# Patient Record
Sex: MALE | Race: WHITE | Employment: OTHER | URBAN - METROPOLITAN AREA
[De-identification: names, ages, dates, MRNs, and addresses within clinical notes are randomized per-mention and may not be internally consistent; named-entity substitution may affect disease eponyms.]

---

## 2017-05-05 ENCOUNTER — OFFICE VISIT (OUTPATIENT)
Dept: FAMILY MEDICINE CLINIC | Age: 77
End: 2017-05-05

## 2017-05-05 VITALS
BODY MASS INDEX: 35.37 KG/M2 | DIASTOLIC BLOOD PRESSURE: 72 MMHG | WEIGHT: 233.4 LBS | TEMPERATURE: 97.9 F | HEIGHT: 68 IN | SYSTOLIC BLOOD PRESSURE: 126 MMHG

## 2017-05-05 DIAGNOSIS — E78.00 PURE HYPERCHOLESTEROLEMIA: Primary | ICD-10-CM

## 2017-05-05 DIAGNOSIS — I10 ESSENTIAL HYPERTENSION, BENIGN: ICD-10-CM

## 2017-05-05 DIAGNOSIS — M10.9 GOUT OF FOOT, UNSPECIFIED CAUSE, UNSPECIFIED CHRONICITY, UNSPECIFIED LATERALITY: ICD-10-CM

## 2017-05-05 DIAGNOSIS — E78.00 PURE HYPERCHOLESTEROLEMIA: ICD-10-CM

## 2017-05-05 DIAGNOSIS — Z23 NEED FOR PNEUMOCOCCAL VACCINATION: ICD-10-CM

## 2017-05-05 LAB
ALT SERPL-CCNC: 41 U/L (ref 10–40)
ANION GAP SERPL CALCULATED.3IONS-SCNC: 18 MMOL/L (ref 3–16)
AST SERPL-CCNC: 30 U/L (ref 15–37)
BUN BLDV-MCNC: 27 MG/DL (ref 7–20)
CALCIUM SERPL-MCNC: 8.9 MG/DL (ref 8.3–10.6)
CHLORIDE BLD-SCNC: 100 MMOL/L (ref 99–110)
CHOLESTEROL, TOTAL: 149 MG/DL (ref 0–199)
CO2: 23 MMOL/L (ref 21–32)
CREAT SERPL-MCNC: 1.1 MG/DL (ref 0.8–1.3)
GFR AFRICAN AMERICAN: >60
GFR NON-AFRICAN AMERICAN: >60
GLUCOSE BLD-MCNC: 109 MG/DL (ref 70–99)
HDLC SERPL-MCNC: 37 MG/DL (ref 40–60)
LDL CHOLESTEROL CALCULATED: 80 MG/DL
POTASSIUM SERPL-SCNC: 4.1 MMOL/L (ref 3.5–5.1)
SODIUM BLD-SCNC: 141 MMOL/L (ref 136–145)
TRIGL SERPL-MCNC: 158 MG/DL (ref 0–150)
VLDLC SERPL CALC-MCNC: 32 MG/DL

## 2017-05-05 PROCEDURE — 90732 PPSV23 VACC 2 YRS+ SUBQ/IM: CPT | Performed by: INTERNAL MEDICINE

## 2017-05-05 PROCEDURE — 1036F TOBACCO NON-USER: CPT | Performed by: INTERNAL MEDICINE

## 2017-05-05 PROCEDURE — 1123F ACP DISCUSS/DSCN MKR DOCD: CPT | Performed by: INTERNAL MEDICINE

## 2017-05-05 PROCEDURE — 3017F COLORECTAL CA SCREEN DOC REV: CPT | Performed by: INTERNAL MEDICINE

## 2017-05-05 PROCEDURE — G8427 DOCREV CUR MEDS BY ELIG CLIN: HCPCS | Performed by: INTERNAL MEDICINE

## 2017-05-05 PROCEDURE — 4040F PNEUMOC VAC/ADMIN/RCVD: CPT | Performed by: INTERNAL MEDICINE

## 2017-05-05 PROCEDURE — G8417 CALC BMI ABV UP PARAM F/U: HCPCS | Performed by: INTERNAL MEDICINE

## 2017-05-05 PROCEDURE — 99213 OFFICE O/P EST LOW 20 MIN: CPT | Performed by: INTERNAL MEDICINE

## 2017-05-05 PROCEDURE — G0009 ADMIN PNEUMOCOCCAL VACCINE: HCPCS | Performed by: INTERNAL MEDICINE

## 2017-05-05 RX ORDER — LISINOPRIL 40 MG/1
TABLET ORAL
Qty: 30 TABLET | Refills: 5 | Status: SHIPPED | OUTPATIENT
Start: 2017-05-05 | End: 2017-10-12 | Stop reason: SDUPTHER

## 2017-05-05 RX ORDER — ALLOPURINOL 300 MG/1
TABLET ORAL
Qty: 30 TABLET | Refills: 5 | Status: SHIPPED | OUTPATIENT
Start: 2017-05-05 | End: 2017-11-10 | Stop reason: SDUPTHER

## 2017-05-05 RX ORDER — EZETIMIBE AND SIMVASTATIN 10; 40 MG/1; MG/1
1 TABLET ORAL DAILY
Qty: 30 TABLET | Refills: 5 | Status: SHIPPED | OUTPATIENT
Start: 2017-05-05 | End: 2017-11-10 | Stop reason: SDUPTHER

## 2017-05-05 RX ORDER — HYDROCHLOROTHIAZIDE 25 MG/1
TABLET ORAL
Qty: 30 TABLET | Refills: 5 | Status: SHIPPED | OUTPATIENT
Start: 2017-05-05 | End: 2017-11-10 | Stop reason: SDUPTHER

## 2017-05-05 ASSESSMENT — ENCOUNTER SYMPTOMS
CONSTIPATION: 0
WHEEZING: 0
SORE THROAT: 0
BLOOD IN STOOL: 0
RHINORRHEA: 0
APNEA: 0
ABDOMINAL PAIN: 0
NAUSEA: 0
SHORTNESS OF BREATH: 0
COUGH: 0
SINUS PRESSURE: 0
DIARRHEA: 0

## 2017-06-16 ENCOUNTER — TELEPHONE (OUTPATIENT)
Dept: FAMILY MEDICINE CLINIC | Age: 77
End: 2017-06-16

## 2017-06-19 ENCOUNTER — TELEPHONE (OUTPATIENT)
Dept: FAMILY MEDICINE CLINIC | Age: 77
End: 2017-06-19

## 2017-08-29 ENCOUNTER — OFFICE VISIT (OUTPATIENT)
Dept: FAMILY MEDICINE CLINIC | Age: 77
End: 2017-08-29

## 2017-08-29 VITALS
SYSTOLIC BLOOD PRESSURE: 126 MMHG | DIASTOLIC BLOOD PRESSURE: 64 MMHG | TEMPERATURE: 98.2 F | HEIGHT: 68 IN | BODY MASS INDEX: 35.49 KG/M2 | WEIGHT: 234.2 LBS

## 2017-08-29 DIAGNOSIS — B96.89 ACUTE BACTERIAL SINUSITIS: ICD-10-CM

## 2017-08-29 DIAGNOSIS — J01.90 ACUTE BACTERIAL SINUSITIS: ICD-10-CM

## 2017-08-29 PROCEDURE — G8427 DOCREV CUR MEDS BY ELIG CLIN: HCPCS | Performed by: INTERNAL MEDICINE

## 2017-08-29 PROCEDURE — 99213 OFFICE O/P EST LOW 20 MIN: CPT | Performed by: INTERNAL MEDICINE

## 2017-08-29 PROCEDURE — 1123F ACP DISCUSS/DSCN MKR DOCD: CPT | Performed by: INTERNAL MEDICINE

## 2017-08-29 PROCEDURE — G8417 CALC BMI ABV UP PARAM F/U: HCPCS | Performed by: INTERNAL MEDICINE

## 2017-08-29 PROCEDURE — 3017F COLORECTAL CA SCREEN DOC REV: CPT | Performed by: INTERNAL MEDICINE

## 2017-08-29 PROCEDURE — 4040F PNEUMOC VAC/ADMIN/RCVD: CPT | Performed by: INTERNAL MEDICINE

## 2017-08-29 PROCEDURE — 1036F TOBACCO NON-USER: CPT | Performed by: INTERNAL MEDICINE

## 2017-08-29 RX ORDER — CEFUROXIME AXETIL 250 MG/1
250 TABLET ORAL 2 TIMES DAILY
Qty: 20 TABLET | Refills: 0 | Status: SHIPPED | OUTPATIENT
Start: 2017-08-29 | End: 2017-09-08

## 2017-08-29 ASSESSMENT — ENCOUNTER SYMPTOMS
RHINORRHEA: 1
COUGH: 0
SHORTNESS OF BREATH: 0
WHEEZING: 0
SORE THROAT: 1
SINUS PRESSURE: 1
APNEA: 0
ABDOMINAL PAIN: 0

## 2017-10-13 RX ORDER — LISINOPRIL 40 MG/1
TABLET ORAL
Qty: 30 TABLET | Refills: 4 | Status: SHIPPED | OUTPATIENT
Start: 2017-10-13 | End: 2017-11-10 | Stop reason: SDUPTHER

## 2017-11-10 ENCOUNTER — OFFICE VISIT (OUTPATIENT)
Dept: FAMILY MEDICINE CLINIC | Age: 77
End: 2017-11-10

## 2017-11-10 VITALS
TEMPERATURE: 98 F | BODY MASS INDEX: 34.92 KG/M2 | WEIGHT: 230.4 LBS | DIASTOLIC BLOOD PRESSURE: 66 MMHG | SYSTOLIC BLOOD PRESSURE: 118 MMHG | HEIGHT: 68 IN

## 2017-11-10 DIAGNOSIS — E78.00 PURE HYPERCHOLESTEROLEMIA: ICD-10-CM

## 2017-11-10 DIAGNOSIS — M79.645 THUMB PAIN, LEFT: ICD-10-CM

## 2017-11-10 DIAGNOSIS — Z12.5 SCREENING PSA (PROSTATE SPECIFIC ANTIGEN): ICD-10-CM

## 2017-11-10 DIAGNOSIS — E78.00 PURE HYPERCHOLESTEROLEMIA: Primary | ICD-10-CM

## 2017-11-10 DIAGNOSIS — I10 ESSENTIAL HYPERTENSION, BENIGN: ICD-10-CM

## 2017-11-10 LAB
ALT SERPL-CCNC: 48 U/L (ref 10–40)
ANION GAP SERPL CALCULATED.3IONS-SCNC: 15 MMOL/L (ref 3–16)
AST SERPL-CCNC: 37 U/L (ref 15–37)
BUN BLDV-MCNC: 31 MG/DL (ref 7–20)
CALCIUM SERPL-MCNC: 9.4 MG/DL (ref 8.3–10.6)
CHLORIDE BLD-SCNC: 100 MMOL/L (ref 99–110)
CHOLESTEROL, TOTAL: 144 MG/DL (ref 0–199)
CO2: 26 MMOL/L (ref 21–32)
CREAT SERPL-MCNC: 1.1 MG/DL (ref 0.8–1.3)
GFR AFRICAN AMERICAN: >60
GFR NON-AFRICAN AMERICAN: >60
GLUCOSE BLD-MCNC: 101 MG/DL (ref 70–99)
HDLC SERPL-MCNC: 40 MG/DL (ref 40–60)
LDL CHOLESTEROL CALCULATED: 73 MG/DL
POTASSIUM SERPL-SCNC: 4.1 MMOL/L (ref 3.5–5.1)
PROSTATE SPECIFIC ANTIGEN: 1.51 NG/ML (ref 0–4)
SODIUM BLD-SCNC: 141 MMOL/L (ref 136–145)
TRIGL SERPL-MCNC: 157 MG/DL (ref 0–150)
VLDLC SERPL CALC-MCNC: 31 MG/DL

## 2017-11-10 PROCEDURE — 4040F PNEUMOC VAC/ADMIN/RCVD: CPT | Performed by: INTERNAL MEDICINE

## 2017-11-10 PROCEDURE — G8427 DOCREV CUR MEDS BY ELIG CLIN: HCPCS | Performed by: INTERNAL MEDICINE

## 2017-11-10 PROCEDURE — G8484 FLU IMMUNIZE NO ADMIN: HCPCS | Performed by: INTERNAL MEDICINE

## 2017-11-10 PROCEDURE — 99214 OFFICE O/P EST MOD 30 MIN: CPT | Performed by: INTERNAL MEDICINE

## 2017-11-10 PROCEDURE — G8417 CALC BMI ABV UP PARAM F/U: HCPCS | Performed by: INTERNAL MEDICINE

## 2017-11-10 PROCEDURE — 1036F TOBACCO NON-USER: CPT | Performed by: INTERNAL MEDICINE

## 2017-11-10 PROCEDURE — 1123F ACP DISCUSS/DSCN MKR DOCD: CPT | Performed by: INTERNAL MEDICINE

## 2017-11-10 PROCEDURE — 3017F COLORECTAL CA SCREEN DOC REV: CPT | Performed by: INTERNAL MEDICINE

## 2017-11-10 RX ORDER — ALLOPURINOL 300 MG/1
TABLET ORAL
Qty: 30 TABLET | Refills: 5 | Status: SHIPPED | OUTPATIENT
Start: 2017-11-10 | End: 2018-05-23 | Stop reason: SDUPTHER

## 2017-11-10 RX ORDER — LISINOPRIL 40 MG/1
TABLET ORAL
Qty: 30 TABLET | Refills: 4 | Status: SHIPPED | OUTPATIENT
Start: 2017-11-10 | End: 2018-08-13 | Stop reason: SDUPTHER

## 2017-11-10 RX ORDER — HYDROCHLOROTHIAZIDE 25 MG/1
TABLET ORAL
Qty: 30 TABLET | Refills: 5 | Status: SHIPPED | OUTPATIENT
Start: 2017-11-10 | End: 2018-04-16 | Stop reason: SDUPTHER

## 2017-11-10 RX ORDER — EZETIMIBE AND SIMVASTATIN 10; 40 MG/1; MG/1
1 TABLET ORAL DAILY
Qty: 30 TABLET | Refills: 5 | Status: SHIPPED | OUTPATIENT
Start: 2017-11-10 | End: 2018-04-16 | Stop reason: SDUPTHER

## 2017-11-10 ASSESSMENT — ENCOUNTER SYMPTOMS
SHORTNESS OF BREATH: 0
WHEEZING: 0
VOMITING: 0
SORE THROAT: 0
SINUS PAIN: 0
BLOOD IN STOOL: 0
APNEA: 0
COUGH: 0
ABDOMINAL PAIN: 0
DIARRHEA: 0
NAUSEA: 0
CONSTIPATION: 0
RHINORRHEA: 0

## 2017-11-10 ASSESSMENT — PATIENT HEALTH QUESTIONNAIRE - PHQ9
SUM OF ALL RESPONSES TO PHQ9 QUESTIONS 1 & 2: 0
SUM OF ALL RESPONSES TO PHQ QUESTIONS 1-9: 0
1. LITTLE INTEREST OR PLEASURE IN DOING THINGS: 0
2. FEELING DOWN, DEPRESSED OR HOPELESS: 0

## 2017-11-10 NOTE — PROGRESS NOTES
Immunization History   Administered Date(s) Administered    Influenza, High Dose 09/28/2015, 10/11/2016, 10/15/2017    Pneumococcal 13-valent Conjugate ([id number]) 04/14/2015, 05/04/2016    Pneumococcal Conjugate 7-valent 12/02/2008    Pneumococcal Polysaccharide ([id number]) 05/05/2017    Tdap (Boostrix, Adacel) 06/20/2011    Zoster 03/25/2009
He appears well-developed and well-nourished. No distress. HENT:   Head: Normocephalic and atraumatic. Right Ear: External ear normal.   Mouth/Throat: Oropharynx is clear and moist.   Eyes: Conjunctivae are normal. Pupils are equal, round, and reactive to light. Neck: No JVD present. No tracheal deviation present. No thyromegaly present. Cardiovascular: Regular rhythm. No murmur heard. Pulmonary/Chest: Effort normal and breath sounds normal. No respiratory distress. He has no wheezes. He has no rales. He exhibits no tenderness. Abdominal: He exhibits no distension. There is no tenderness. There is no rebound and no guarding. Musculoskeletal: He exhibits no edema. Lymphadenopathy:     He has no cervical adenopathy. Neurological: He is alert and oriented to person, place, and time. No cranial nerve deficit. Skin: No rash noted. He is not diaphoretic. Psychiatric: He has a normal mood and affect. His behavior is normal.       Assessment:      1. Pure hypercholesterolemia  LIPID PANEL    AST    ALT   2. Screening PSA (prostate specific antigen)  PSA Screening   3. Essential hypertension, benign  BASIC METABOLIC PANEL   4.  Thumb pain, left     tendonitis thumb and heal      Plan:      Outpatient Encounter Prescriptions as of 11/10/2017   Medication Sig Dispense Refill    lisinopril (PRINIVIL;ZESTRIL) 40 MG tablet TAKE ONE TABLET BY MOUTH DAILY 30 tablet 4    hydrocortisone (PROCTOZONE-HC) 2.5 % rectal cream Apply twice daily to affected area 1 Tube 1    hydrochlorothiazide (HYDRODIURIL) 25 MG tablet TAKE ONE TABLET BY MOUTH DAILY 30 tablet 5    allopurinol (ZYLOPRIM) 300 MG tablet TAKE ONE TABLET BY MOUTH DAILY 30 tablet 5    ezetimibe-simvastatin (VYTORIN) 10-40 MG per tablet Take 1 tablet by mouth daily 30 tablet 5    Omega-3 Fatty Acids (FISH OIL) 1000 MG CAPS Take 1,000 mg by mouth 2 times daily      aspirin 81 MG tablet Take 81 mg by mouth daily      MULTIPLE VITAMIN PO Take by mouth

## 2018-02-26 ENCOUNTER — OFFICE VISIT (OUTPATIENT)
Dept: FAMILY MEDICINE CLINIC | Age: 78
End: 2018-02-26

## 2018-02-26 VITALS
HEIGHT: 67 IN | DIASTOLIC BLOOD PRESSURE: 75 MMHG | HEART RATE: 90 BPM | BODY MASS INDEX: 36.88 KG/M2 | WEIGHT: 235 LBS | SYSTOLIC BLOOD PRESSURE: 130 MMHG

## 2018-02-26 DIAGNOSIS — E78.00 PURE HYPERCHOLESTEROLEMIA: ICD-10-CM

## 2018-02-26 DIAGNOSIS — I10 ESSENTIAL HYPERTENSION, BENIGN: Primary | ICD-10-CM

## 2018-02-26 DIAGNOSIS — R35.1 NOCTURIA: ICD-10-CM

## 2018-02-26 DIAGNOSIS — M10.9 GOUT OF FOOT, UNSPECIFIED CAUSE, UNSPECIFIED CHRONICITY, UNSPECIFIED LATERALITY: ICD-10-CM

## 2018-02-26 PROCEDURE — 1123F ACP DISCUSS/DSCN MKR DOCD: CPT | Performed by: FAMILY MEDICINE

## 2018-02-26 PROCEDURE — 4040F PNEUMOC VAC/ADMIN/RCVD: CPT | Performed by: FAMILY MEDICINE

## 2018-02-26 PROCEDURE — 3017F COLORECTAL CA SCREEN DOC REV: CPT | Performed by: FAMILY MEDICINE

## 2018-02-26 PROCEDURE — 1036F TOBACCO NON-USER: CPT | Performed by: FAMILY MEDICINE

## 2018-02-26 PROCEDURE — 99215 OFFICE O/P EST HI 40 MIN: CPT | Performed by: FAMILY MEDICINE

## 2018-02-26 PROCEDURE — G8484 FLU IMMUNIZE NO ADMIN: HCPCS | Performed by: FAMILY MEDICINE

## 2018-02-26 PROCEDURE — G8417 CALC BMI ABV UP PARAM F/U: HCPCS | Performed by: FAMILY MEDICINE

## 2018-02-26 PROCEDURE — G8427 DOCREV CUR MEDS BY ELIG CLIN: HCPCS | Performed by: FAMILY MEDICINE

## 2018-02-26 RX ORDER — TAMSULOSIN HYDROCHLORIDE 0.4 MG/1
0.4 CAPSULE ORAL DAILY
Qty: 30 CAPSULE | Refills: 3 | Status: SHIPPED | OUTPATIENT
Start: 2018-02-26 | End: 2018-06-22 | Stop reason: SDUPTHER

## 2018-02-26 ASSESSMENT — ENCOUNTER SYMPTOMS
EYES NEGATIVE: 1
VOMITING: 0
SORE THROAT: 0
GASTROINTESTINAL NEGATIVE: 1
NAUSEA: 0
EYE PAIN: 0
CONSTIPATION: 0
SHORTNESS OF BREATH: 0
RESPIRATORY NEGATIVE: 1
ABDOMINAL PAIN: 0
COLOR CHANGE: 0
COUGH: 0
DIARRHEA: 0
RHINORRHEA: 0

## 2018-02-26 NOTE — PROGRESS NOTES
Medication Sig Dispense Refill    lisinopril (PRINIVIL;ZESTRIL) 40 MG tablet TAKE ONE TABLET BY MOUTH DAILY 30 tablet 4    hydrocortisone (PROCTOZONE-HC) 2.5 % rectal cream Apply twice daily to affected area 1 Tube 1    hydrochlorothiazide (HYDRODIURIL) 25 MG tablet TAKE ONE TABLET BY MOUTH DAILY 30 tablet 5    allopurinol (ZYLOPRIM) 300 MG tablet TAKE ONE TABLET BY MOUTH DAILY 30 tablet 5    ezetimibe-simvastatin (VYTORIN) 10-40 MG per tablet Take 1 tablet by mouth daily 30 tablet 5    Omega-3 Fatty Acids (FISH OIL) 1000 MG CAPS Take 1,000 mg by mouth 2 times daily      aspirin 81 MG tablet Take 81 mg by mouth daily      MULTIPLE VITAMIN PO Take by mouth daily      Hydrocortisone Butyrate 0.1 % OINT        No current facility-administered medications on file prior to visit.         Past Medical History:   Diagnosis Date    Colitis     Diverticulitis     Diverticulosis     Essential hypertension, benign     Gout     Hyperlipidemia     Malignant melanoma of neck ([address]     PVC's (premature ventricular contractions)      Past Surgical History:   Procedure Laterality Date    BLADDER REPAIR  7/23/1998    partial cystectomy    COLECTOMY      COLON SURGERY      COLONOSCOPY      HERNIA REPAIR  1969, 1999, 2005    SKIN BIOPSY      SKIN CANCER EXCISION  9/30/2013    TONSILLECTOMY  1947    TUMOR REMOVAL  1970    right forearm     Family History   Problem Relation Age of Onset    Heart Failure Father 66    Stroke Mother 80    Other Sister 21     mastoids    Heart Failure Brother     Heart Failure Brother 68     older brother   Jose D [place] Sister 68     oldest sister/breast/brain    Cancer Sister 76     Social History   Substance Use Topics    Smoking status: Never Smoker    Smokeless tobacco: Never Used    Alcohol use 1.8 - 2.4 oz/week     3 - 4 Standard drinks or equivalent per week      Comment: every other day       Objective   /75   Pulse 90   Ht 5' 7\" (1.702 m)   Wt 235 lb (106.6 kg)   BMI 36.81 kg/m²   Wt Readings from Last 3 Encounters:   02/26/18 235 lb (106.6 kg)   11/10/17 230 lb 6.4 oz (104.5 kg)   08/29/17 234 lb 3.2 oz (106.2 kg)       Physical Exam   Constitutional: He appears well-developed and well-nourished. Moderately obese   HENT:   Head: Normocephalic and atraumatic. Nose: Nose normal.   Mouth/Throat: No oropharyngeal exudate. TMs negative bilaterally, canals patent, nasal mucosa pink and patent,  Oropharynx pink and patent   Eyes: Pupils are equal, round, and reactive to light. Right eye exhibits no discharge. Left eye exhibits no discharge. No scleral icterus. Neck: Normal range of motion. Neck supple. No thyromegaly present. Cardiovascular: Normal rate, regular rhythm, normal heart sounds and intact distal pulses. Exam reveals no gallop and no friction rub. No murmur heard. Pulses:       Dorsalis pedis pulses are 2+ on the right side, and 2+ on the left side. Posterior tibial pulses are 2+ on the right side, and 2+ on the left side. 2/6 systolic murmur   Pulmonary/Chest: Effort normal and breath sounds normal. He has no wheezes. He has no rales. Abdominal: Soft. Bowel sounds are normal. He exhibits no distension. There is no splenomegaly or hepatomegaly. There is no tenderness. There is no rebound and no guarding. Musculoskeletal: Normal range of motion. He exhibits no tenderness or deformity. Lymphadenopathy:     He has no cervical adenopathy. Neurological: He is alert. He has normal strength. No cranial nerve deficit or sensory deficit. Gait normal.   Skin: Skin is warm and dry. No rash noted. No erythema. Psychiatric: He has a normal mood and affect. His speech is normal and behavior is normal.   Vitals reviewed.         Chemistry        Component Value Date/Time     11/10/2017 0851    K 4.1 11/10/2017 0851     11/10/2017 0851    CO2 26 11/10/2017 0851    BUN 31 (H) 11/10/2017 0851    CREATININE 1.1 11/10/2017 4502 Component Value Date/Time    CALCIUM 9.4 11/10/2017 0851    ALKPHOS 85 08/13/2016 0846    AST 37 11/10/2017 0851    ALT 48 (H) 11/10/2017 0851    BILITOT 0.8 08/13/2016 0846          Lab Results   Component Value Date    WBC 7.6 08/13/2016    HGB 14.7 08/13/2016    HCT 44.1 08/13/2016    MCV 95.2 08/13/2016     08/13/2016     No results found for: LABA1C  No results found for: EAG  No results found for: LABA1C  No components found for: CHLPL  Lab Results   Component Value Date    TRIG 157 (H) 11/10/2017    TRIG 158 (H) 05/05/2017    TRIG 170 (H) 11/04/2016     Lab Results   Component Value Date    HDL 40 11/10/2017    HDL 37 (L) 05/05/2017    HDL 36 (L) 11/04/2016     Lab Results   Component Value Date    LDLCALC 73 11/10/2017    LDLCALC 80 05/05/2017    LDLCALC 72 11/04/2016     Lab Results   Component Value Date    LABVLDL 31 11/10/2017    LABVLDL 32 05/05/2017    LABVLDL 34 11/04/2016         Assessment   Plan     1. Essential hypertension, benign  Controlled: Appears stable. We will continue current management and monitor for adverse reaction and disease progression. Follow-up as noted below      2. Gout of foot, unspecified cause, unspecified chronicity, unspecified laterality  Controlled: Appears stable. We will continue current management and monitor for adverse reaction and disease progression. Follow-up as noted below      3. Pure hypercholesterolemia  Controlled: Appears stable. We will continue current management and monitor for adverse reaction and disease progression. Follow-up as noted below      4. Nocturia  Mild to moderate symptomatology : We will trial Flomax and recheck patient in 2 months. - tamsulosin (FLOMAX) 0.4 MG capsule; Take 1 capsule by mouth daily  Dispense: 30 capsule; Refill: 3    Discussed use, benefit, and side effects of prescribed medications. Barriers to medication compliance addressed. All patient questions answered. Pt voiced understanding.          RTC 2

## 2018-03-27 ENCOUNTER — OFFICE VISIT (OUTPATIENT)
Dept: FAMILY MEDICINE CLINIC | Age: 78
End: 2018-03-27

## 2018-03-27 VITALS
HEIGHT: 67 IN | DIASTOLIC BLOOD PRESSURE: 76 MMHG | HEART RATE: 83 BPM | SYSTOLIC BLOOD PRESSURE: 164 MMHG | WEIGHT: 237 LBS | BODY MASS INDEX: 37.2 KG/M2

## 2018-03-27 DIAGNOSIS — J20.9 ACUTE BRONCHITIS, UNSPECIFIED ORGANISM: ICD-10-CM

## 2018-03-27 DIAGNOSIS — I35.0 AORTIC STENOSIS, MILD: ICD-10-CM

## 2018-03-27 DIAGNOSIS — R35.1 NOCTURIA: Primary | ICD-10-CM

## 2018-03-27 PROCEDURE — 1036F TOBACCO NON-USER: CPT | Performed by: FAMILY MEDICINE

## 2018-03-27 PROCEDURE — 3017F COLORECTAL CA SCREEN DOC REV: CPT | Performed by: FAMILY MEDICINE

## 2018-03-27 PROCEDURE — G8482 FLU IMMUNIZE ORDER/ADMIN: HCPCS | Performed by: FAMILY MEDICINE

## 2018-03-27 PROCEDURE — 99214 OFFICE O/P EST MOD 30 MIN: CPT | Performed by: FAMILY MEDICINE

## 2018-03-27 PROCEDURE — 4040F PNEUMOC VAC/ADMIN/RCVD: CPT | Performed by: FAMILY MEDICINE

## 2018-03-27 PROCEDURE — 1123F ACP DISCUSS/DSCN MKR DOCD: CPT | Performed by: FAMILY MEDICINE

## 2018-03-27 PROCEDURE — G8417 CALC BMI ABV UP PARAM F/U: HCPCS | Performed by: FAMILY MEDICINE

## 2018-03-27 PROCEDURE — G8427 DOCREV CUR MEDS BY ELIG CLIN: HCPCS | Performed by: FAMILY MEDICINE

## 2018-03-27 RX ORDER — SULFAMETHOXAZOLE AND TRIMETHOPRIM 800; 160 MG/1; MG/1
1 TABLET ORAL 2 TIMES DAILY
Qty: 14 TABLET | Refills: 0 | Status: SHIPPED | OUTPATIENT
Start: 2018-03-27 | End: 2018-04-03

## 2018-03-27 RX ORDER — BENZONATATE 100 MG/1
200 CAPSULE ORAL 3 TIMES DAILY PRN
Qty: 20 CAPSULE | Refills: 0 | Status: SHIPPED | OUTPATIENT
Start: 2018-03-27 | End: 2018-04-03

## 2018-03-27 ASSESSMENT — ENCOUNTER SYMPTOMS
NAUSEA: 0
CONSTIPATION: 0
ABDOMINAL PAIN: 0
COUGH: 1
DIARRHEA: 0
SHORTNESS OF BREATH: 0
SORE THROAT: 1
VOMITING: 0
RHINORRHEA: 1

## 2018-03-27 NOTE — PROGRESS NOTES
Chief Complaint   Patient presents with    Chest Congestion     Week    Cough     Productive yellowish/Grey w/ a little bloody/A week    Other     Tickle in his throat    Other     Sneezing         HPI:  Natasha Pond is a 78 y.o. (: 1940) here today   for multiple medical problems. He is taking his medicine for his Nocturia without side effects. He is only getting up at night to go to the bathroom on an average twice a night. He says that his stream is much stronger than it was. He has been Told  Aortic stenosis is mild and doesn't have any plans for further visits. He is able to mow the lawn without exertional dyspnea or chest pain    He complains of a one-week history of cough associated with blood-tinged in great sputum worse in the mornings. As well as sinus congestion and some sore throat      Review of Systems   Constitutional: Negative for chills and fever. HENT: Positive for rhinorrhea and sore throat. Respiratory: Positive for cough. Negative for shortness of breath. Cardiovascular: Negative for chest pain and palpitations. Gastrointestinal: Negative for abdominal pain, constipation, diarrhea, nausea and vomiting. Endocrine: Negative for polyuria. Genitourinary: Negative for dysuria.        Past Medical History:   Diagnosis Date    Aortic stenosis, mild 3/27/2018    Colitis     Diverticulitis     Diverticulosis     Essential hypertension, benign     Gout     Hyperlipidemia     Malignant [facility] of [facility] ([facility] [address] [address]     PVC's (premature ventricular contractions)      Family History   Problem Relation Age of Onset    Heart Failure Father 66    Stroke Mother 80    Other Sister 21     mastoids    Heart Failure Brother     Heart Failure Brother 68     older brother    Cancer Sister 68     oldest sister/breast/brain    Cancer Sister 76     Social History     Social History    Marital status:      Spouse name: Derrick Cervantes Number of children: 6    Years of education: N/A     Occupational History    retired      Social History Main Topics    Smoking status: Never Smoker    Smokeless tobacco: Never Used    Alcohol use 1.8 - 2.4 oz/week     3 - 4 Standard drinks or equivalent per week      Comment: every other day    Drug use: No    Sexual activity: Yes     Partners: Female     Other Topics Concern    Not on file     Social History Narrative    No narrative on file       New Prescriptions    No medications on file         Meds Prior to visit:  Prior to Visit Medications    Medication Sig Taking? Authorizing Provider   tamsulosin (FLOMAX) 0.4 MG capsule Take 1 capsule by mouth daily Yes Ortiz Sanders MD   lisinopril (PRINIVIL;ZESTRIL) 40 MG tablet TAKE ONE TABLET BY MOUTH DAILY Yes Jesenia Butt, DO   hydrocortisone (PROCTOZONE-HC) 2.5 % rectal cream Apply twice daily to affected area Yes Jesenia Butt, DO   hydrochlorothiazide (HYDRODIURIL) 25 MG tablet TAKE ONE TABLET BY MOUTH DAILY Yes Jesenia Butt, DO   allopurinol (ZYLOPRIM) 300 MG tablet TAKE ONE TABLET BY MOUTH DAILY Yes Jesenia Butt, DO   ezetimibe-simvastatin (VYTORIN) 10-40 MG per tablet Take 1 tablet by mouth daily Yes Jesenia Butt, DO   Omega-3 Fatty Acids (FISH OIL) 1000 MG CAPS Take 1,000 mg by mouth 2 times daily Yes Historical Provider, MD   aspirin 81 MG tablet Take 81 mg by mouth daily Yes Historical Provider, MD   MULTIPLE VITAMIN PO Take by mouth daily Yes Historical Provider, MD   Hydrocortisone Butyrate 0.1 % OINT  Yes Historical Provider, MD     Allergies   Allergen Reactions    Guaifenesin & Derivatives      Nervous.  Metamucil [Psyllium]      Chest pain.  Norflex Tablets [Orphenadrine]      Urination issues.        OBJECTIVE:    Ht 5' 7\" (1.702 m)   Wt 237 lb (107.5 kg)   BMI 37.12 kg/m²   BP Readings from Last 2 Encounters:   02/26/18 130/75   11/10/17 118/66     Wt Readings from Last 3 Encounters:   03/27/18 237 lb (107.5 kg)   02/26/18 235 lb (106.6 kg)   11/10/17 230 lb

## 2018-03-30 ENCOUNTER — HOSPITAL ENCOUNTER (OUTPATIENT)
Dept: NON INVASIVE DIAGNOSTICS | Age: 78
Discharge: HOME | End: 2018-03-30
Attending: FAMILY MEDICINE | Admitting: FAMILY MEDICINE

## 2018-03-30 DIAGNOSIS — I35.0 NONRHEUMATIC AORTIC VALVE STENOSIS: ICD-10-CM

## 2018-03-30 LAB
LV EF: 63 %
LVEF MODALITY: NORMAL

## 2018-04-16 RX ORDER — EZETIMIBE AND SIMVASTATIN 10; 40 MG/1; MG/1
TABLET ORAL
Qty: 30 TABLET | Refills: 4 | Status: SHIPPED | OUTPATIENT
Start: 2018-04-16 | End: 2018-08-15 | Stop reason: SDUPTHER

## 2018-04-16 RX ORDER — HYDROCHLOROTHIAZIDE 25 MG/1
TABLET ORAL
Qty: 30 TABLET | Refills: 4 | Status: SHIPPED | OUTPATIENT
Start: 2018-04-16 | End: 2018-08-15 | Stop reason: SDUPTHER

## 2018-05-03 ENCOUNTER — OFFICE VISIT (OUTPATIENT)
Dept: FAMILY MEDICINE CLINIC | Age: 78
End: 2018-05-03

## 2018-05-03 VITALS
HEART RATE: 80 BPM | DIASTOLIC BLOOD PRESSURE: 71 MMHG | SYSTOLIC BLOOD PRESSURE: 130 MMHG | HEIGHT: 67 IN | BODY MASS INDEX: 36.73 KG/M2 | WEIGHT: 234 LBS

## 2018-05-03 DIAGNOSIS — E78.00 PURE HYPERCHOLESTEROLEMIA: ICD-10-CM

## 2018-05-03 DIAGNOSIS — I35.0 AORTIC STENOSIS, MILD: ICD-10-CM

## 2018-05-03 DIAGNOSIS — I10 ESSENTIAL HYPERTENSION, BENIGN: Primary | ICD-10-CM

## 2018-05-03 DIAGNOSIS — M10.9 GOUT OF FOOT, UNSPECIFIED CAUSE, UNSPECIFIED CHRONICITY, UNSPECIFIED LATERALITY: ICD-10-CM

## 2018-05-03 PROBLEM — M79.645 THUMB PAIN, LEFT: Status: RESOLVED | Noted: 2017-11-10 | Resolved: 2018-05-03

## 2018-05-03 PROBLEM — B96.89 ACUTE BACTERIAL SINUSITIS: Status: RESOLVED | Noted: 2017-08-29 | Resolved: 2018-05-03

## 2018-05-03 PROBLEM — J01.90 ACUTE BACTERIAL SINUSITIS: Status: RESOLVED | Noted: 2017-08-29 | Resolved: 2018-05-03

## 2018-05-03 LAB
A/G RATIO: 2 (ref 1.1–2.2)
ALBUMIN SERPL-MCNC: 4.4 G/DL (ref 3.4–5)
ALP BLD-CCNC: 75 U/L (ref 40–129)
ALT SERPL-CCNC: 40 U/L (ref 10–40)
ANION GAP SERPL CALCULATED.3IONS-SCNC: 17 MMOL/L (ref 3–16)
AST SERPL-CCNC: 47 U/L (ref 15–37)
BILIRUB SERPL-MCNC: 0.8 MG/DL (ref 0–1)
BUN BLDV-MCNC: 27 MG/DL (ref 7–20)
CALCIUM SERPL-MCNC: 9 MG/DL (ref 8.3–10.6)
CHLORIDE BLD-SCNC: 101 MMOL/L (ref 99–110)
CO2: 24 MMOL/L (ref 21–32)
CREAT SERPL-MCNC: 1.2 MG/DL (ref 0.8–1.3)
GFR AFRICAN AMERICAN: >60
GFR NON-AFRICAN AMERICAN: 59
GLOBULIN: 2.2 G/DL
GLUCOSE BLD-MCNC: 95 MG/DL (ref 70–99)
POTASSIUM SERPL-SCNC: 4.1 MMOL/L (ref 3.5–5.1)
SODIUM BLD-SCNC: 142 MMOL/L (ref 136–145)
TOTAL PROTEIN: 6.6 G/DL (ref 6.4–8.2)
URIC ACID, SERUM: 7 MG/DL (ref 3.5–7.2)

## 2018-05-03 PROCEDURE — G8417 CALC BMI ABV UP PARAM F/U: HCPCS | Performed by: FAMILY MEDICINE

## 2018-05-03 PROCEDURE — 3017F COLORECTAL CA SCREEN DOC REV: CPT | Performed by: FAMILY MEDICINE

## 2018-05-03 PROCEDURE — 4040F PNEUMOC VAC/ADMIN/RCVD: CPT | Performed by: FAMILY MEDICINE

## 2018-05-03 PROCEDURE — 1123F ACP DISCUSS/DSCN MKR DOCD: CPT | Performed by: FAMILY MEDICINE

## 2018-05-03 PROCEDURE — 1036F TOBACCO NON-USER: CPT | Performed by: FAMILY MEDICINE

## 2018-05-03 PROCEDURE — 99214 OFFICE O/P EST MOD 30 MIN: CPT | Performed by: FAMILY MEDICINE

## 2018-05-03 PROCEDURE — G8427 DOCREV CUR MEDS BY ELIG CLIN: HCPCS | Performed by: FAMILY MEDICINE

## 2018-05-03 PROCEDURE — 36415 COLL VENOUS BLD VENIPUNCTURE: CPT | Performed by: FAMILY MEDICINE

## 2018-05-03 ASSESSMENT — ENCOUNTER SYMPTOMS
VOMITING: 0
NAUSEA: 0
COUGH: 0
ABDOMINAL PAIN: 0
DIARRHEA: 0
SHORTNESS OF BREATH: 0
CONSTIPATION: 0

## 2018-05-23 ENCOUNTER — TELEPHONE (OUTPATIENT)
Dept: FAMILY MEDICINE CLINIC | Age: 78
End: 2018-05-23

## 2018-05-23 DIAGNOSIS — M10.9 GOUT OF FOOT, UNSPECIFIED CAUSE, UNSPECIFIED CHRONICITY, UNSPECIFIED LATERALITY: Primary | ICD-10-CM

## 2018-05-23 RX ORDER — ALLOPURINOL 300 MG/1
TABLET ORAL
Qty: 30 TABLET | Refills: 5 | Status: SHIPPED | OUTPATIENT
Start: 2018-05-23 | End: 2018-05-24 | Stop reason: SDUPTHER

## 2018-05-24 DIAGNOSIS — M10.9 GOUT OF FOOT, UNSPECIFIED CAUSE, UNSPECIFIED CHRONICITY, UNSPECIFIED LATERALITY: ICD-10-CM

## 2018-05-24 RX ORDER — ALLOPURINOL 300 MG/1
TABLET ORAL
Qty: 30 TABLET | Refills: 5 | Status: SHIPPED | OUTPATIENT
Start: 2018-05-24 | End: 2018-12-18 | Stop reason: SDUPTHER

## 2018-06-22 DIAGNOSIS — R35.1 NOCTURIA: ICD-10-CM

## 2018-06-22 RX ORDER — TAMSULOSIN HYDROCHLORIDE 0.4 MG/1
CAPSULE ORAL
Qty: 90 CAPSULE | Refills: 3 | Status: SHIPPED | OUTPATIENT
Start: 2018-06-22 | End: 2019-04-23 | Stop reason: SDUPTHER

## 2018-08-07 ENCOUNTER — TELEPHONE (OUTPATIENT)
Dept: FAMILY MEDICINE CLINIC | Age: 78
End: 2018-08-07

## 2018-08-07 NOTE — TELEPHONE ENCOUNTER
Dr. Genoveva Gilmore:    Notes: This patient has an upcoming appointment with you for Hypertension. In planning for that visit I have completed the following pre-visit planning:     Pre-Visit Planning Checklist:  Patient contacted: yes  Verified patient by name and date of birth: yes    Health Maintenance items reviewed:    No pre-visit planning health maintenance topics to review at this time    Labs and procedures pended:     Labs and procedures discussed with patient: yes  Reminded patient to check with their insurance company about coverage for lab tests and lab location: no    Preliminary Medication Reconciliation: was performed. Reminded patient to arrive early: yes    Please complete the med-reconciliation and sign the appropriate labs as soon as possible.       Toribio Julian, [address]  Pre-Services Specialist

## 2018-08-13 ENCOUNTER — OFFICE VISIT (OUTPATIENT)
Dept: FAMILY MEDICINE CLINIC | Age: 78
End: 2018-08-13

## 2018-08-13 VITALS
BODY MASS INDEX: 36.88 KG/M2 | WEIGHT: 235 LBS | SYSTOLIC BLOOD PRESSURE: 132 MMHG | DIASTOLIC BLOOD PRESSURE: 72 MMHG | HEIGHT: 67 IN | HEART RATE: 76 BPM

## 2018-08-13 DIAGNOSIS — I10 ESSENTIAL HYPERTENSION, BENIGN: Primary | ICD-10-CM

## 2018-08-13 DIAGNOSIS — M10.9 GOUT OF FOOT, UNSPECIFIED CAUSE, UNSPECIFIED CHRONICITY, UNSPECIFIED LATERALITY: ICD-10-CM

## 2018-08-13 DIAGNOSIS — E78.00 PURE HYPERCHOLESTEROLEMIA: ICD-10-CM

## 2018-08-13 PROCEDURE — 1036F TOBACCO NON-USER: CPT | Performed by: FAMILY MEDICINE

## 2018-08-13 PROCEDURE — 3017F COLORECTAL CA SCREEN DOC REV: CPT | Performed by: FAMILY MEDICINE

## 2018-08-13 PROCEDURE — 99214 OFFICE O/P EST MOD 30 MIN: CPT | Performed by: FAMILY MEDICINE

## 2018-08-13 PROCEDURE — G8417 CALC BMI ABV UP PARAM F/U: HCPCS | Performed by: FAMILY MEDICINE

## 2018-08-13 PROCEDURE — G8427 DOCREV CUR MEDS BY ELIG CLIN: HCPCS | Performed by: FAMILY MEDICINE

## 2018-08-13 PROCEDURE — 1101F PT FALLS ASSESS-DOCD LE1/YR: CPT | Performed by: FAMILY MEDICINE

## 2018-08-13 PROCEDURE — 4040F PNEUMOC VAC/ADMIN/RCVD: CPT | Performed by: FAMILY MEDICINE

## 2018-08-13 PROCEDURE — 1123F ACP DISCUSS/DSCN MKR DOCD: CPT | Performed by: FAMILY MEDICINE

## 2018-08-13 RX ORDER — LISINOPRIL 40 MG/1
TABLET ORAL
Qty: 90 TABLET | Refills: 1 | Status: SHIPPED | OUTPATIENT
Start: 2018-08-13 | End: 2019-02-08 | Stop reason: SDUPTHER

## 2018-08-13 ASSESSMENT — ENCOUNTER SYMPTOMS
SHORTNESS OF BREATH: 0
NAUSEA: 0
DIARRHEA: 0
VOMITING: 0
ABDOMINAL PAIN: 0
CONSTIPATION: 0
COUGH: 0

## 2018-08-13 NOTE — PROGRESS NOTES
Smokeless tobacco: Never Used    Alcohol use 1.8 - 2.4 oz/week     3 - 4 Standard drinks or equivalent per week      Comment: every other day    Drug use: No    Sexual activity: Yes     Partners: Female     Other Topics Concern    Not on file     Social History Narrative    No narrative on file       New Prescriptions    No medications on file         Meds Prior to visit:  Prior to Visit Medications    Medication Sig Taking? Authorizing Provider   tamsulosin (FLOMAX) 0.4 MG capsule TAKE ONE CAPSULE BY MOUTH DAILY Yes Odilon Lizarraga MD   allopurinol (ZYLOPRIM) 300 MG tablet TAKE ONE TABLET BY MOUTH DAILY Yes Odilon Lizarraga MD   hydrochlorothiazide (HYDRODIURIL) 25 MG tablet TAKE ONE TABLET BY MOUTH DAILY Yes Odilon Lizarraga MD   ezetimibe-simvastatin (VYTORIN) 10-40 MG per tablet TAKE ONE TABLET BY MOUTH DAILY Yes Odilon Lizarraga MD   lisinopril (PRINIVIL;ZESTRIL) 40 MG tablet TAKE ONE TABLET BY MOUTH DAILY Yes [place] Later, DO   hydrocortisone (PROCTOZONE-HC) 2.5 % rectal cream Apply twice daily to affected area Yes [place] Later, DO   Omega-3 Fatty Acids (FISH OIL) 1000 MG CAPS Take 1,000 mg by mouth 2 times daily Yes Historical Provider, MD   aspirin 81 MG tablet Take 81 mg by mouth daily Yes Historical Provider, MD   MULTIPLE VITAMIN PO Take by mouth daily Yes Historical Provider, MD   Hydrocortisone Butyrate 0.1 % OINT  Yes Historical Provider, MD     Allergies   Allergen Reactions    Guaifenesin & Derivatives      Nervous.  Metamucil [Psyllium]      Chest pain.  Norflex Tablets [Orphenadrine]      Urination issues.        OBJECTIVE:    /72   Pulse 76   Ht 5' 7\" (1.702 m)   Wt 235 lb (106.6 kg)   BMI 36.81 kg/m²   BP Readings from Last 2 Encounters:   05/03/18 130/71   03/27/18 (!) 164/76     Wt Readings from Last 3 Encounters:   08/13/18 235 lb (106.6 kg)   05/03/18 234 lb (106.1 kg)   03/27/18 237 lb (107.5 kg)       Physical Exam   Constitutional: He appears well-developed and

## 2018-08-15 ENCOUNTER — TELEPHONE (OUTPATIENT)
Dept: FAMILY MEDICINE CLINIC | Age: 78
End: 2018-08-15

## 2018-08-15 RX ORDER — EZETIMIBE AND SIMVASTATIN 10; 40 MG/1; MG/1
TABLET ORAL
Qty: 30 TABLET | Refills: 4 | Status: SHIPPED | OUTPATIENT
Start: 2018-08-15 | End: 2018-12-14 | Stop reason: SDUPTHER

## 2018-08-15 RX ORDER — HYDROCHLOROTHIAZIDE 25 MG/1
TABLET ORAL
Qty: 30 TABLET | Refills: 4 | Status: SHIPPED | OUTPATIENT
Start: 2018-08-15 | End: 2018-11-13 | Stop reason: SDUPTHER

## 2018-10-11 ENCOUNTER — NURSE ONLY (OUTPATIENT)
Dept: FAMILY MEDICINE CLINIC | Age: 78
End: 2018-10-11
Payer: MEDICARE

## 2018-10-11 DIAGNOSIS — Z23 NEED FOR INFLUENZA VACCINATION: Primary | ICD-10-CM

## 2018-10-11 PROCEDURE — G0008 ADMIN INFLUENZA VIRUS VAC: HCPCS | Performed by: FAMILY MEDICINE

## 2018-10-11 PROCEDURE — 90682 RIV4 VACC RECOMBINANT DNA IM: CPT | Performed by: FAMILY MEDICINE

## 2018-10-18 ENCOUNTER — OFFICE VISIT (OUTPATIENT)
Dept: FAMILY MEDICINE CLINIC | Age: 78
End: 2018-10-18
Payer: MEDICARE

## 2018-10-18 VITALS
DIASTOLIC BLOOD PRESSURE: 70 MMHG | WEIGHT: 236 LBS | HEART RATE: 85 BPM | TEMPERATURE: 97.8 F | BODY MASS INDEX: 36.96 KG/M2 | SYSTOLIC BLOOD PRESSURE: 125 MMHG

## 2018-10-18 DIAGNOSIS — J02.0 STREP THROAT: Primary | ICD-10-CM

## 2018-10-18 PROCEDURE — 1036F TOBACCO NON-USER: CPT | Performed by: FAMILY MEDICINE

## 2018-10-18 PROCEDURE — 99213 OFFICE O/P EST LOW 20 MIN: CPT | Performed by: FAMILY MEDICINE

## 2018-10-18 PROCEDURE — G8482 FLU IMMUNIZE ORDER/ADMIN: HCPCS | Performed by: FAMILY MEDICINE

## 2018-10-18 PROCEDURE — G8417 CALC BMI ABV UP PARAM F/U: HCPCS | Performed by: FAMILY MEDICINE

## 2018-10-18 PROCEDURE — G8428 CUR MEDS NOT DOCUMENT: HCPCS | Performed by: FAMILY MEDICINE

## 2018-10-18 PROCEDURE — 1101F PT FALLS ASSESS-DOCD LE1/YR: CPT | Performed by: FAMILY MEDICINE

## 2018-10-18 PROCEDURE — 1123F ACP DISCUSS/DSCN MKR DOCD: CPT | Performed by: FAMILY MEDICINE

## 2018-10-18 PROCEDURE — 4040F PNEUMOC VAC/ADMIN/RCVD: CPT | Performed by: FAMILY MEDICINE

## 2018-10-18 RX ORDER — AZITHROMYCIN 250 MG/1
TABLET, FILM COATED ORAL
Qty: 1 PACKET | Refills: 0 | Status: SHIPPED | OUTPATIENT
Start: 2018-10-18 | End: 2018-11-19 | Stop reason: ALTCHOICE

## 2018-10-18 RX ORDER — METHYLPREDNISOLONE 4 MG/1
TABLET ORAL
Qty: 21 TABLET | Refills: 0 | Status: SHIPPED | OUTPATIENT
Start: 2018-10-18 | End: 2018-11-19 | Stop reason: ALTCHOICE

## 2018-10-18 ASSESSMENT — ENCOUNTER SYMPTOMS
ABDOMINAL PAIN: 0
VOMITING: 0
NAUSEA: 0
COUGH: 0
SHORTNESS OF BREATH: 0
SORE THROAT: 1
CONSTIPATION: 0
DIARRHEA: 0

## 2018-10-18 NOTE — PROGRESS NOTES
Derivatives      Nervous.  Metamucil [Psyllium]      Chest pain.  Norflex Tablets [Orphenadrine]      Urination issues. OBJECTIVE:    /70   Pulse 85   Temp 97.8 °F (36.6 °C) (Oral)   Wt 236 lb (107 kg)   BMI 36.96 kg/m²   BP Readings from Last 2 Encounters:   08/13/18 132/72   05/03/18 130/71     Wt Readings from Last 3 Encounters:   08/13/18 235 lb (106.6 kg)   05/03/18 234 lb (106.1 kg)   03/27/18 237 lb (107.5 kg)       Physical Exam   Constitutional: He appears well-developed and well-nourished. HENT:   Moderately red pharynx with palatal petechia    Cardiovascular: Normal rate and regular rhythm. Exam reveals no gallop and no friction rub. Murmur heard. Systolic murmur is present with a grade of 2/6   Pulmonary/Chest: Effort normal and breath sounds normal. He has no wheezes. He has no rales. Abdominal: Soft. Bowel sounds are normal. He exhibits no distension and no mass. There is no tenderness. Skin: Skin is warm and dry. No rash noted. ASSESSMENT/PLAN:    1. Strep throat  Palatal petechia a R strongly correlated with strep throat. We will treat him for such. Call if no better in 2-4 days  - azithromycin (ZITHROMAX) 250 MG tablet; 2 today then one a day for 4 more days. Dispense: 1 packet; Refill: 0  - methylPREDNISolone (MEDROL DOSEPACK) 4 MG tablet; Take by mouth. Dispense: 21 tablet; Refill: 0        RTC as needed    Future Appointments  Date Time Provider Vivek Knight   11/19/2018 9:20 AM Ge Hurd MD [facility]        Scribeattestation: I, Wendy Eduardo, am scribing for and in the presence of Andra Nelson MD. Electronically signed by Wendy Eduardo on 10/18/2018 at 1:58 PM            Provider attestation: Ilia Carroll MD, personally performed the services scribed by the user listed above in my presence, and it is both accurate and complete.  I agree with the ROS and Past Histories independently gathered by the clinical support staff and

## 2018-11-13 RX ORDER — HYDROCHLOROTHIAZIDE 25 MG/1
TABLET ORAL
Qty: 90 TABLET | Refills: 0 | Status: SHIPPED | OUTPATIENT
Start: 2018-11-13 | End: 2019-02-08 | Stop reason: SDUPTHER

## 2018-11-19 ENCOUNTER — OFFICE VISIT (OUTPATIENT)
Dept: FAMILY MEDICINE CLINIC | Age: 78
End: 2018-11-19
Payer: MEDICARE

## 2018-11-19 VITALS
SYSTOLIC BLOOD PRESSURE: 128 MMHG | BODY MASS INDEX: 37.04 KG/M2 | HEIGHT: 67 IN | DIASTOLIC BLOOD PRESSURE: 70 MMHG | OXYGEN SATURATION: 96 % | HEART RATE: 81 BPM | RESPIRATION RATE: 12 BRPM | WEIGHT: 236 LBS

## 2018-11-19 DIAGNOSIS — Z85.820 HISTORY OF MALIGNANT MELANOMA: ICD-10-CM

## 2018-11-19 DIAGNOSIS — R10.32 LEFT LOWER QUADRANT PAIN: ICD-10-CM

## 2018-11-19 DIAGNOSIS — N40.0 PROSTATISM: ICD-10-CM

## 2018-11-19 DIAGNOSIS — I35.0 AORTIC STENOSIS, MILD: ICD-10-CM

## 2018-11-19 DIAGNOSIS — M10.9 GOUT, UNSPECIFIED CAUSE, UNSPECIFIED CHRONICITY, UNSPECIFIED SITE: ICD-10-CM

## 2018-11-19 DIAGNOSIS — R74.8 ELEVATED LIVER ENZYMES: ICD-10-CM

## 2018-11-19 DIAGNOSIS — E78.00 PURE HYPERCHOLESTEROLEMIA: ICD-10-CM

## 2018-11-19 DIAGNOSIS — I10 ESSENTIAL HYPERTENSION, BENIGN: Primary | ICD-10-CM

## 2018-11-19 LAB
ALBUMIN SERPL-MCNC: 4.5 G/DL (ref 3.4–5)
ALP BLD-CCNC: 92 U/L (ref 40–129)
ALT SERPL-CCNC: 41 U/L (ref 10–40)
ANION GAP SERPL CALCULATED.3IONS-SCNC: 20 MMOL/L (ref 3–16)
AST SERPL-CCNC: 41 U/L (ref 15–37)
BILIRUB SERPL-MCNC: 0.6 MG/DL (ref 0–1)
BILIRUBIN DIRECT: <0.2 MG/DL (ref 0–0.3)
BILIRUBIN, INDIRECT: ABNORMAL MG/DL (ref 0–1)
BUN BLDV-MCNC: 23 MG/DL (ref 7–20)
CALCIUM SERPL-MCNC: 9.5 MG/DL (ref 8.3–10.6)
CHLORIDE BLD-SCNC: 102 MMOL/L (ref 99–110)
CHOLESTEROL, FASTING: 147 MG/DL (ref 0–199)
CO2: 22 MMOL/L (ref 21–32)
CREAT SERPL-MCNC: 1.2 MG/DL (ref 0.8–1.3)
GFR AFRICAN AMERICAN: >60
GFR NON-AFRICAN AMERICAN: 59
GLUCOSE BLD-MCNC: 107 MG/DL (ref 70–99)
HCT VFR BLD CALC: 45.8 % (ref 40.5–52.5)
HDLC SERPL-MCNC: 37 MG/DL (ref 40–60)
HEMOGLOBIN: 15.3 G/DL (ref 13.5–17.5)
HEPATITIS C ANTIBODY INTERPRETATION: NORMAL
LDL CHOLESTEROL CALCULATED: 69 MG/DL
MCH RBC QN AUTO: 32 PG (ref 26–34)
MCHC RBC AUTO-ENTMCNC: 33.4 G/DL (ref 31–36)
MCV RBC AUTO: 95.9 FL (ref 80–100)
PDW BLD-RTO: 15.3 % (ref 12.4–15.4)
PLATELET # BLD: 259 K/UL (ref 135–450)
PMV BLD AUTO: 8.5 FL (ref 5–10.5)
POTASSIUM SERPL-SCNC: 4.3 MMOL/L (ref 3.5–5.1)
RBC # BLD: 4.77 M/UL (ref 4.2–5.9)
SODIUM BLD-SCNC: 144 MMOL/L (ref 136–145)
TOTAL PROTEIN: 7.1 G/DL (ref 6.4–8.2)
TRIGLYCERIDE, FASTING: 203 MG/DL (ref 0–150)
URIC ACID, SERUM: 6.8 MG/DL (ref 3.5–7.2)
VLDLC SERPL CALC-MCNC: 41 MG/DL
WBC # BLD: 6.7 K/UL (ref 4–11)

## 2018-11-19 PROCEDURE — G8417 CALC BMI ABV UP PARAM F/U: HCPCS | Performed by: INTERNAL MEDICINE

## 2018-11-19 PROCEDURE — G8427 DOCREV CUR MEDS BY ELIG CLIN: HCPCS | Performed by: INTERNAL MEDICINE

## 2018-11-19 PROCEDURE — 4040F PNEUMOC VAC/ADMIN/RCVD: CPT | Performed by: INTERNAL MEDICINE

## 2018-11-19 PROCEDURE — 1101F PT FALLS ASSESS-DOCD LE1/YR: CPT | Performed by: INTERNAL MEDICINE

## 2018-11-19 PROCEDURE — 99214 OFFICE O/P EST MOD 30 MIN: CPT | Performed by: INTERNAL MEDICINE

## 2018-11-19 PROCEDURE — 1036F TOBACCO NON-USER: CPT | Performed by: INTERNAL MEDICINE

## 2018-11-19 PROCEDURE — G8482 FLU IMMUNIZE ORDER/ADMIN: HCPCS | Performed by: INTERNAL MEDICINE

## 2018-11-19 PROCEDURE — 1123F ACP DISCUSS/DSCN MKR DOCD: CPT | Performed by: INTERNAL MEDICINE

## 2018-11-19 PROCEDURE — G8510 SCR DEP NEG, NO PLAN REQD: HCPCS | Performed by: INTERNAL MEDICINE

## 2018-11-19 PROCEDURE — 36415 COLL VENOUS BLD VENIPUNCTURE: CPT | Performed by: INTERNAL MEDICINE

## 2018-11-19 ASSESSMENT — PATIENT HEALTH QUESTIONNAIRE - PHQ9
SUM OF ALL RESPONSES TO PHQ9 QUESTIONS 1 & 2: 0
1. LITTLE INTEREST OR PLEASURE IN DOING THINGS: 0
SUM OF ALL RESPONSES TO PHQ QUESTIONS 1-9: 0
SUM OF ALL RESPONSES TO PHQ QUESTIONS 1-9: 0
2. FEELING DOWN, DEPRESSED OR HOPELESS: 0

## 2018-11-19 NOTE — PROGRESS NOTES
HPI: Dior Tang presents follow up    Chronic health issues include history melanoma, partial colectomy and cystectomy for diverticulitis and fissure, aortic stenosis, colonic polyps, hyperlipidemia, prostatism, hypertension    Hx melanoma. Follows with derm yearly. Hypertension. Echocardiogram 2018 with trivial aortic mitral and tricuspid regurgitation. Ejection fraction 60-65%. Mild increased left ventricular wall thickness. Mild aortic stenosis. No syncope, exercise intolerance or edema. No TIA-like symptoms. Checks blood pressure. Sedentary. Reports stress thallium normal in 2005. History diverticulosis partial colectomy and cystectomy for fistulas. Occasional left lower quadrant discomfort. No blood in the stool or vomiting. Last colonoscopy August 2013. No GE reflux. No constipation. Gout well-controlled with allopurinol. Last flare 94. Prostatism well controlled with Flomax. No STD concerns. Nocturia ×2. Gout      Specialists    Shiraz,     [place] doctor          PMH:   tonsillecomtoy   hernia repari left   fatty tumor forearm  Sigmoid colectomy and blader repair. Partial cystectomy  Ventral hernia repair with mesh  Melanoma     FH: - melanoma,colon, prostate cancer       SH : History tobacco. Positive alcohol. No recreational drugs. Retired. Review of systems: Up-to-date eye exams. Not going to the dentist. No falls. Denies any wheezing. Occasional bronchitis. No chest pain palpitations lower extremity edema. Denies any GE reflux bloody stools constipation. Positive prostatism. Nocturia ×2. Rare knee discomfort. Follows with dermatology yearly. Constitutional, ent, CV, respiratory, GI, , joint, skin, allergic and psychiatric ROS negative except for above    Allergies   Allergen Reactions    Guaifenesin & Derivatives      Nervous.  Metamucil [Psyllium]      Chest pain.  Norflex Tablets [Orphenadrine]      Urination issues.        Outpatient Prescriptions Marked as Taking for the 11/19/18 encounter (Office Visit) with Wu Driscoll MD   Medication Sig Dispense Refill    hydrochlorothiazide (HYDRODIURIL) 25 MG tablet TAKE ONE TABLET BY MOUTH DAILY 90 tablet 0    ezetimibe-simvastatin (VYTORIN) 10-40 MG per tablet TAKE ONE TABLET BY MOUTH DAILY 30 tablet 4    lisinopril (PRINIVIL;ZESTRIL) 40 MG tablet TAKE ONE TABLET BY MOUTH DAILY 90 tablet 1    tamsulosin (FLOMAX) 0.4 MG capsule TAKE ONE CAPSULE BY MOUTH DAILY 90 capsule 3    allopurinol (ZYLOPRIM) 300 MG tablet TAKE ONE TABLET BY MOUTH DAILY 30 tablet 5    hydrocortisone (PROCTOZONE-HC) 2.5 % rectal cream Apply twice daily to affected area 1 Tube 1    Omega-3 Fatty Acids (FISH OIL) 1000 MG CAPS Take 1,000 mg by mouth 2 times daily      aspirin 81 MG tablet Take 81 mg by mouth daily      Hydrocortisone Butyrate 0.1 % OINT                Past Medical History:   Diagnosis Date    Aortic stenosis, mild 03/27/2018    Echo 3/30/18 - mild  with preserved EF    Colitis     Diverticulitis     Diverticulosis     Elevated liver enzymes 11/19/2018    Essential hypertension, benign     Gout     Hyperlipidemia     Malignant melanoma of neck ([facility] [address]     PVC's (premature ventricular contractions)        Past Surgical History:   Procedure Laterality Date    BLADDER REPAIR  7/23/1998    partial cystectomy    COLECTOMY      COLON SURGERY      [facility]     [facility], [facility], [facility]    SKIN BIOPSY      SKIN CANCER EXCISION  9/30/2013    TONSILLECTOMY  1947    TUMOR REMOVAL  1970    right forearm         Social History     Social History    Marital status:      Spouse name: Zo Mccarthy Number of children: 10    Years of education: N/A     Occupational History    retired      Social History Main Topics    Smoking status: Former Smoker     Packs/day: 1.00     Years: 18.00     Types: Cigarettes     Quit date: 1977    Smokeless tobacco: Never Used    Alcohol use 1.8 - 2.4 oz/week     3 - 4 Standard drinks or equivalent per week      Comment: every other day    Drug use: No    Sexual activity: Yes     Partners: Female     Other Topics Concern    Not on file     Social History Narrative    No narrative on file       Family History   Problem Relation Age of Onset    Heart Failure Father 66    Stroke Mother 80    Other Sister 21        mastoids    Heart Failure Brother     Heart Failure Brother 68        older brother   Alfalfa Belling Sister 68        oldest sister/breast/brain    Cancer Sister 76           Review of Systems        Objective     /70   Pulse 81   Resp 12   Ht 5' 7\" (1.702 m)   Wt 236 lb (107 kg)   SpO2 96% Comment: RA  BMI 36.96 kg/m²     @LASTSAO2(3)@    Wt Readings from Last 3 Encounters:   11/19/18 236 lb (107 kg)   10/18/18 236 lb (107 kg)   08/13/18 235 lb (106.6 kg)       Physical Exam     NAD alert and cooperative  HEENT: Marginal dentition. Throat is clear. No cranial nerve deficits. No carotid bruits. Lungs are clear. Decreased breath sounds no wheezes rales or rhonchi  Cardiovascular exam regular rate and rhythm. 2/6 murmur throughout the precordium. Positive obesity. No hepatosplenomegaly. Mild left lower quadrant tenderness. Good range motion the joints. Crepitus of the knees. Multiple seborrheic keratoses posterior trunk. No peripheral edema.  No gouty lesions      Chemistry        Component Value Date/Time     05/03/2018 1051    K 4.1 05/03/2018 1051     05/03/2018 1051    CO2 24 05/03/2018 1051    BUN 27 (H) 05/03/2018 1051    CREATININE 1.2 05/03/2018 1051        Component Value Date/Time    CALCIUM 9.0 05/03/2018 1051    ALKPHOS 75 05/03/2018 1051    AST 47 (H) 05/03/2018 1051    ALT 40 05/03/2018 1051    BILITOT 0.8 05/03/2018 1051            Lab Results   Component Value Date    WBC 7.6 08/13/2016    HGB 14.7 08/13/2016    HCT 44.1 08/13/2016    MCV 95.2 08/13/2016     08/13/2016     No results found for: LABA1C  No results found for: EAG  No results found for: LABA1C  No components found for: CHLPL  Lab Results   Component Value Date    TRIG 157 (H) 11/10/2017    TRIG 158 (H) 05/05/2017    TRIG 170 (H) 11/04/2016     Lab Results   Component Value Date    HDL 40 11/10/2017    HDL 37 (L) 05/05/2017    HDL 36 (L) 11/04/2016     Lab Results   Component Value Date    LDLCALC 73 11/10/2017    LDLCALC 80 05/05/2017    LDLCALC 72 11/04/2016     Lab Results   Component Value Date    LABVLDL 31 11/10/2017    LABVLDL 32 05/05/2017    LABVLDL 34 11/04/2016       Old labs and records reviewed or requested  Discussed past lab and studies with patient      Diagnosis Orders   1. Essential hypertension, benign  Basic Metabolic Panel   2. Pure hypercholesterolemia  Lipid, Fasting   3. Elevated liver enzymes  Hepatic Function Panel    Hepatitis C Antibody   4. Aortic stenosis, mild     5. History of malignant melanoma     6. Prostatism  PSA, TOTAL AND FREE   7. Gout, unspecified cause, unspecified chronicity, unspecified site  Uric Acid   8. Left lower quadrant pain  CBC     Hypertension good control LVH with aortic stenosis. Asymptomatic. Continue current medication. No gouty flares with hydrochlorothiazide. Hyperlipidemia. Recheck lipid profile and liver function today. Mildly elevated liver function test when looking at old records. We'll have hepatitis C. History melanoma. Refill check with dermatology in the last month. We'll continue yearly. Mild left lower quadrant discomfort. CBC. If worsening will do CT abdomen. Return in about 6 months (around 5/19/2019). Diagnosis and treatment discussed.   Possible side effects of medication reviewed  Patients questions answered  Follow up understood  Pt aware if they are not contacted about any test results , this does not mean they are normal.  They should call

## 2018-11-21 LAB
PROSTATE SPECIFIC ANTIGEN FREE: 2 UG/L
PROSTATE SPECIFIC ANTIGEN PERCENT FREE: 44.4 %
PROSTATE SPECIFIC ANTIGEN: 4.5 UG/L (ref 0–4)

## 2018-11-25 DIAGNOSIS — R97.20 ELEVATED PSA: Primary | ICD-10-CM

## 2018-12-18 DIAGNOSIS — M10.9 GOUT OF FOOT, UNSPECIFIED CAUSE, UNSPECIFIED CHRONICITY, UNSPECIFIED LATERALITY: ICD-10-CM

## 2018-12-18 RX ORDER — ALLOPURINOL 300 MG/1
TABLET ORAL
Qty: 90 TABLET | Refills: 0 | Status: SHIPPED | OUTPATIENT
Start: 2018-12-18 | End: 2019-07-18

## 2019-02-08 DIAGNOSIS — I10 ESSENTIAL HYPERTENSION, BENIGN: ICD-10-CM

## 2019-02-09 RX ORDER — HYDROCHLOROTHIAZIDE 25 MG/1
TABLET ORAL
Qty: 90 TABLET | Refills: 0 | Status: SHIPPED | OUTPATIENT
Start: 2019-02-09 | End: 2019-05-08

## 2019-02-09 RX ORDER — LISINOPRIL 40 MG/1
TABLET ORAL
Qty: 90 TABLET | Refills: 0 | Status: SHIPPED | OUTPATIENT
Start: 2019-02-09 | End: 2019-05-08

## 2019-02-19 ENCOUNTER — TELEPHONE (OUTPATIENT)
Dept: FAMILY MEDICINE CLINIC | Age: 79
End: 2019-02-19

## 2019-05-08 DIAGNOSIS — I10 ESSENTIAL HYPERTENSION, BENIGN: ICD-10-CM

## 2019-05-08 RX ORDER — LISINOPRIL 40 MG/1
TABLET ORAL
Qty: 90 TABLET | Refills: 0 | OUTPATIENT
Start: 2019-05-08

## 2019-05-08 RX ORDER — HYDROCHLOROTHIAZIDE 25 MG/1
TABLET ORAL
Qty: 90 TABLET | Refills: 0 | OUTPATIENT
Start: 2019-05-08

## 2019-05-08 RX ORDER — LISINOPRIL AND HYDROCHLOROTHIAZIDE 20; 12.5 MG/1; MG/1
TABLET ORAL
Qty: 180 TABLET | Refills: 1 | Status: SHIPPED | OUTPATIENT
Start: 2019-05-08 | End: 2019-11-05 | Stop reason: SDUPTHER

## 2019-05-20 ENCOUNTER — HOSPITAL ENCOUNTER (OUTPATIENT)
Age: 79
Discharge: HOME | End: 2019-05-20
Payer: MEDICARE

## 2019-05-20 ENCOUNTER — TELEPHONE (OUTPATIENT)
Dept: FAMILY MEDICINE CLINIC | Age: 79
End: 2019-05-20

## 2019-05-20 ENCOUNTER — HOSPITAL ENCOUNTER (OUTPATIENT)
Dept: GENERAL RADIOLOGY | Age: 79
Discharge: HOME | End: 2019-05-20
Payer: MEDICARE

## 2019-05-20 ENCOUNTER — OFFICE VISIT (OUTPATIENT)
Dept: FAMILY MEDICINE CLINIC | Age: 79
End: 2019-05-20
Payer: MEDICARE

## 2019-05-20 VITALS
HEART RATE: 89 BPM | RESPIRATION RATE: 16 BRPM | HEIGHT: 67 IN | SYSTOLIC BLOOD PRESSURE: 138 MMHG | DIASTOLIC BLOOD PRESSURE: 78 MMHG | WEIGHT: 236 LBS | BODY MASS INDEX: 37.04 KG/M2 | OXYGEN SATURATION: 95 %

## 2019-05-20 DIAGNOSIS — I35.0 AORTIC STENOSIS, MILD: Primary | ICD-10-CM

## 2019-05-20 DIAGNOSIS — Z77.090 ASBESTOS EXPOSURE: ICD-10-CM

## 2019-05-20 DIAGNOSIS — E78.00 PURE HYPERCHOLESTEROLEMIA: ICD-10-CM

## 2019-05-20 DIAGNOSIS — Z85.820 HISTORY OF MALIGNANT MELANOMA: ICD-10-CM

## 2019-05-20 DIAGNOSIS — R05.9 COUGH: ICD-10-CM

## 2019-05-20 DIAGNOSIS — I10 ESSENTIAL HYPERTENSION, BENIGN: ICD-10-CM

## 2019-05-20 DIAGNOSIS — R74.8 ELEVATED LIVER ENZYMES: ICD-10-CM

## 2019-05-20 DIAGNOSIS — R73.9 ELEVATED BLOOD SUGAR: ICD-10-CM

## 2019-05-20 DIAGNOSIS — R97.20 ELEVATED PSA: ICD-10-CM

## 2019-05-20 DIAGNOSIS — M10.9 GOUT OF FOOT, UNSPECIFIED CAUSE, UNSPECIFIED CHRONICITY, UNSPECIFIED LATERALITY: ICD-10-CM

## 2019-05-20 DIAGNOSIS — E66.9 OBESITY, CLASS II, BMI 35-39.9: ICD-10-CM

## 2019-05-20 LAB
ALBUMIN SERPL-MCNC: 4.4 G/DL (ref 3.4–5)
ALP BLD-CCNC: 93 U/L (ref 40–129)
ALT SERPL-CCNC: 41 U/L (ref 10–40)
AST SERPL-CCNC: 38 U/L (ref 15–37)
BILIRUB SERPL-MCNC: 0.8 MG/DL (ref 0–1)
BILIRUBIN DIRECT: <0.2 MG/DL (ref 0–0.3)
BILIRUBIN, INDIRECT: ABNORMAL MG/DL (ref 0–1)
IRON SATURATION: 32 % (ref 20–50)
IRON: 115 UG/DL (ref 59–158)
PRO-BNP: 22 PG/ML (ref 0–449)
TOTAL IRON BINDING CAPACITY: 360 UG/DL (ref 260–445)
TOTAL PROTEIN: 7 G/DL (ref 6.4–8.2)

## 2019-05-20 PROCEDURE — G8427 DOCREV CUR MEDS BY ELIG CLIN: HCPCS | Performed by: INTERNAL MEDICINE

## 2019-05-20 PROCEDURE — 1123F ACP DISCUSS/DSCN MKR DOCD: CPT | Performed by: INTERNAL MEDICINE

## 2019-05-20 PROCEDURE — 71046 X-RAY EXAM CHEST 2 VIEWS: CPT

## 2019-05-20 PROCEDURE — 99214 OFFICE O/P EST MOD 30 MIN: CPT | Performed by: INTERNAL MEDICINE

## 2019-05-20 PROCEDURE — 36415 COLL VENOUS BLD VENIPUNCTURE: CPT | Performed by: INTERNAL MEDICINE

## 2019-05-20 PROCEDURE — 1036F TOBACCO NON-USER: CPT | Performed by: INTERNAL MEDICINE

## 2019-05-20 PROCEDURE — G8417 CALC BMI ABV UP PARAM F/U: HCPCS | Performed by: INTERNAL MEDICINE

## 2019-05-20 PROCEDURE — 4040F PNEUMOC VAC/ADMIN/RCVD: CPT | Performed by: INTERNAL MEDICINE

## 2019-05-20 ASSESSMENT — PATIENT HEALTH QUESTIONNAIRE - PHQ9
SUM OF ALL RESPONSES TO PHQ QUESTIONS 1-9: 0
1. LITTLE INTEREST OR PLEASURE IN DOING THINGS: 0
SUM OF ALL RESPONSES TO PHQ QUESTIONS 1-9: 0
2. FEELING DOWN, DEPRESSED OR HOPELESS: 0
SUM OF ALL RESPONSES TO PHQ9 QUESTIONS 1 & 2: 0

## 2019-05-20 NOTE — TELEPHONE ENCOUNTER
Yes I saw that one. It is reasonable to recheck aortic valve routinly. If increased shortness of breath with exercise this year would be reasonable.

## 2019-05-20 NOTE — PROGRESS NOTES
exams. Not going to the dentist. No falls. Denies any wheezing. Occasional bronchitis. No chest pain palpitations lower extremity edema. Denies any GE reflux bloody stools constipation. Positive prostatism. Nocturia ×2. Rare knee discomfort. Follows with dermatology yearly.       Constitutional, ent, CV, respiratory, GI, , joint, skin, allergic and psychiatric ROS reviewed and negative except for above    Allergies   Allergen Reactions    Guaifenesin & Derivatives      Nervous.  Metamucil [Psyllium]      Chest pain.  Norflex Tablets [Orphenadrine]      Urination issues.        Outpatient Medications Marked as Taking for the 5/20/19 encounter (Office Visit) with Alina Alcocer MD   Medication Sig Dispense Refill    lisinopril-hydrochlorothiazide (PRINZIDE;ZESTORETIC) 20-12.5 MG per tablet 2 po q day to replace prior separate medication prescription 180 tablet 1    tamsulosin (FLOMAX) 0.4 MG capsule TAKE ONE CAPSULE BY MOUTH DAILY 90 capsule 0    allopurinol (ZYLOPRIM) 300 MG tablet TAKE ONE TABLET BY MOUTH DAILY 90 tablet 0    ezetimibe-simvastatin (VYTORIN) 10-40 MG per tablet TAKE ONE TABLET BY MOUTH DAILY 90 tablet 1    hydrocortisone (PROCTOZONE-HC) 2.5 % rectal cream Apply twice daily to affected area 1 Tube 1    Omega-3 Fatty Acids (FISH OIL) 1000 MG CAPS Take 1,000 mg by mouth 2 times daily      aspirin 81 MG tablet Take 81 mg by mouth daily      Hydrocortisone Butyrate 0.1 % OINT                Past Medical History:   Diagnosis Date    Aortic stenosis, mild 03/27/2018    Echo 3/30/18 - mild  with preserved EF    Colitis     Diverticulitis     Diverticulosis     Elevated liver enzymes 11/19/2018    Essential hypertension, benign     Gout     Hyperlipidemia     Malignant melanoma of neck (HCC)     PVC's (premature ventricular contractions)        Past Surgical History:   Procedure Laterality Date    BLADDER REPAIR  7/23/1998    partial cystectomy    COLECTOMY      COLON SURGERY [address] [facility]    SKIN BIOPSY      SKIN CANCER EXCISION  9/30/2013    TONSILLECTOMY  1947    TUMOR REMOVAL  1970    right forearm             Family History   Problem Relation Age of Onset    Heart Failure Father 66    Stroke Mother 80    Other Sister 21        mastoids    Heart Failure Brother     Heart Failure Brother 68        older brother   Philomena Divers Sister 68        oldest sister/breast/brain    Cancer Sister 76           Review of Systems      Objective     /78   Pulse 89   Resp 16   Ht 5' 7\" (1.702 m)   Wt 236 lb (107 kg)   SpO2 95% Comment: RA  BMI 36.96 kg/m²     @LASTSAO2(3)@    Wt Readings from Last 3 Encounters:   05/20/19 236 lb (107 kg)   11/19/18 236 lb (107 kg)   10/18/18 236 lb (107 kg)       Physical Exam     NAD alert and cooperative deliberate  HEENT: Small hypopharynx. Pink conjunctiva. TMs unremarkable. No postnasal drip however some sniffing during exam. No anterior cervical adenopathy. Full neck. Lungs are clear. No wheezes rales or rhonchi. Good RORY ratio. Cardiovascular exam 106 murmur left upper sternal border. It does not radiate into the carotids. Regular rate and rhythm normal S1 and S2. Abdomen is obese. No hepatosplenomegaly. Bowel sounds heard left base Rx posteriorly. Good pulses feet. Sensation in half. No maceration.     Chemistry        Component Value Date/Time     11/19/2018 0940    K 4.3 11/19/2018 0940     11/19/2018 0940    CO2 22 11/19/2018 0940    BUN 23 (H) 11/19/2018 0940    CREATININE 1.2 11/19/2018 0940        Component Value Date/Time    CALCIUM 9.5 11/19/2018 0940    ALKPHOS 92 11/19/2018 0940    AST 41 (H) 11/19/2018 0940    ALT 41 (H) 11/19/2018 0940    BILITOT 0.6 11/19/2018 0940            Lab Results   Component Value Date    WBC 6.7 11/19/2018    HGB 15.3 11/19/2018    HCT 45.8 11/19/2018    MCV 95.9 11/19/2018     11/19/2018     No results found for: LABA1C  No results found for: EAG  No results found for: LABA1C  No components found for: CHLPL  Lab Results   Component Value Date    TRIG 157 (H) 11/10/2017    TRIG 158 (H) 05/05/2017    TRIG 170 (H) 11/04/2016     Lab Results   Component Value Date    HDL 37 (L) 11/19/2018    HDL 40 11/10/2017    HDL 37 (L) 05/05/2017     Lab Results   Component Value Date    LDLCALC 69 11/19/2018    LDLCALC 73 11/10/2017    LDLCALC 80 05/05/2017     Lab Results   Component Value Date    LABVLDL 41 11/19/2018    LABVLDL 31 11/10/2017    LABVLDL 32 05/05/2017       Old labs and records reviewed or requested  Discussed past lab and studies with patient      Diagnosis Orders   1. Aortic stenosis, mild  BRAIN NATRIURETIC PEPTIDE (BNP)    ECHO Complete 2D W Doppler W Color   2. Elevated liver enzymes  HEPATIC FUNCTION PANEL    Iron and TIBC   3. Elevated PSA     4. Essential hypertension, benign     5. Gout of foot, unspecified cause, unspecified chronicity, unspecified laterality     6. Pure hypercholesterolemia     7. History of malignant melanoma  Ambulatory referral to Dermatology   8. Cough  XR CHEST STANDARD (2 VW)    BRAIN NATRIURETIC PEPTIDE (BNP)   9. Asbestos exposure     10. Obesity, Class II, BMI 35-39.9     11. Elevated blood sugar  Hemoglobin A1C     Chronic cough. Echocardiogram chest x-ray basic metabolic and BNP. Anticipate PPI or nasal spray. Weight gain. Discussed weight loss diet. CAROL Jimenes PSA. Following with urology. Hypertension. Continue current medications. Hyperlipidemia. Continue Vytorin. Return FOLLOW up after tests. Diagnosis and treatment discussed.   Possible side effects of medication reviewed  Patients questions answered  Follow up understood  Pt aware if they are not contacted about any test results , this does not mean they are normal.  They should call

## 2019-05-20 NOTE — PATIENT INSTRUCTIONS
Chest xray  Schedule yearly echocardiogram  Cut back calories. Continue exercise. Will prescribe antiacid and nasal spray if chest xray normal  Patient Education        Chronic Cough: Care Instructions  Your Care Instructions    A cough is your body's response to something that bothers your throat or airways. Many things can cause a cough. You might cough because of a cold or the flu, bronchitis, or asthma. Smoking, postnasal drip, allergies, and stomach acid that backs up into your throat also can cause a cough. A cough can be short-term (acute) or long-term (chronic). A chronic cough lasts more than 3 weeks. A chronic cough is often caused by a long-term problem, such as asthma. Another cause might be a medicine, such as an ACE inhibitor. A cough is a symptom, not a disease. To treat a chronic cough, you may need to treat the problem that causes it. You can take a few steps at home to cough less and feel better. Some people cough or clear their throat out of habit for no clear reason. Follow-up care is a key part of your treatment and safety. Be sure to make and go to all appointments, and call your doctor if you are having problems. It's also a good idea to know your test results and keep a list of the medicines you take. How can you care for yourself at home? · Drink plenty of water and other fluids. This may help soothe a dry or sore throat. Honey or lemon juice in hot water or tea may ease a dry cough. · Prop up your head on pillows to help you breathe and ease a cough. · Do not smoke or allow others to smoke around you. Smoke can make a cough worse. If you need help quitting, talk to your doctor about stop-smoking programs and medicines. These can increase your chances of quitting for good. · Avoid exposure to smoke, dust, or other pollutants, or wear a face mask. Check with your doctor or pharmacist to find out which type of face mask will give you the most benefit.   · Take cough medicine as instruction, always ask your healthcare professional. [place] [address] any warranty or liability for your use of this information. Patient Education        Starting a Weight Loss Plan: Care Instructions  Your Care Instructions    If you are thinking about losing weight, it can be hard to know where to start. Your doctor can help you set up a weight loss plan that best meets your needs. You may want to take a class on nutrition or exercise, or join a weight loss support group. If you have questions about how to make changes to your eating or exercise habits, ask your doctor about seeing a registered dietitian or an exercise specialist.  It can be a big challenge to lose weight. But you do not have to make huge changes at once. Make small changes, and stick with them. When those changes become habit, add a few more changes. If you do not think you are ready to make changes right now, try to pick a date in the future. Make an appointment to see your doctor to discuss whether the time is right for you to start a plan. Follow-up care is a key part of your treatment and safety. Be sure to make and go to all appointments, and call your doctor if you are having problems. It's also a good idea to know your test results and keep a list of the medicines you take. How can you care for yourself at home? · Set realistic goals. Many people expect to lose much more weight than is likely. A weight loss of 5% to 10% of your body weight may be enough to improve your health. · Get family and friends involved to provide support. Talk to them about why you are trying to lose weight, and ask them to help. They can help by participating in exercise and having meals with you, even if they may be eating something different. · Find what works best for you. If you do not have time or do not like to cook, a program that offers meal replacement bars or shakes may be better for you.  Or if you like to prepare meals,

## 2019-05-21 LAB
ESTIMATED AVERAGE GLUCOSE: 137 MG/DL
HBA1C MFR BLD: 6.4 %

## 2019-05-21 RX ORDER — OMEPRAZOLE 20 MG/1
20 CAPSULE, DELAYED RELEASE ORAL 2 TIMES DAILY
Qty: 60 CAPSULE | Refills: 0 | Status: SHIPPED | OUTPATIENT
Start: 2019-05-21 | End: 2019-12-03

## 2019-05-21 RX ORDER — FLUTICASONE PROPIONATE 50 MCG
1 SPRAY, SUSPENSION (ML) NASAL DAILY
Qty: 2 BOTTLE | Refills: 0 | Status: SHIPPED | OUTPATIENT
Start: 2019-05-21 | End: 2019-06-24

## 2019-06-17 RX ORDER — EZETIMIBE AND SIMVASTATIN 10; 40 MG/1; MG/1
TABLET ORAL
Qty: 90 TABLET | Refills: 3 | Status: SHIPPED | OUTPATIENT
Start: 2019-06-17 | End: 2020-06-08

## 2019-06-23 PROBLEM — R73.03 PREDIABETES: Status: ACTIVE | Noted: 2019-06-23

## 2019-06-24 ENCOUNTER — OFFICE VISIT (OUTPATIENT)
Dept: FAMILY MEDICINE CLINIC | Age: 79
End: 2019-06-24
Payer: MEDICARE

## 2019-06-24 VITALS
BODY MASS INDEX: 34.37 KG/M2 | RESPIRATION RATE: 16 BRPM | WEIGHT: 219 LBS | HEIGHT: 67 IN | SYSTOLIC BLOOD PRESSURE: 124 MMHG | HEART RATE: 80 BPM | DIASTOLIC BLOOD PRESSURE: 69 MMHG | OXYGEN SATURATION: 96 %

## 2019-06-24 DIAGNOSIS — E78.00 PURE HYPERCHOLESTEROLEMIA: ICD-10-CM

## 2019-06-24 DIAGNOSIS — N40.0 PROSTATISM: ICD-10-CM

## 2019-06-24 DIAGNOSIS — L30.9 DERMATITIS: ICD-10-CM

## 2019-06-24 DIAGNOSIS — R73.03 PREDIABETES: Primary | ICD-10-CM

## 2019-06-24 DIAGNOSIS — I35.0 AORTIC STENOSIS, MILD: ICD-10-CM

## 2019-06-24 DIAGNOSIS — E66.9 OBESITY, CLASS I, BMI 30.0-34.9 (SEE ACTUAL BMI): ICD-10-CM

## 2019-06-24 DIAGNOSIS — I10 ESSENTIAL HYPERTENSION, BENIGN: ICD-10-CM

## 2019-06-24 PROBLEM — E66.811 OBESITY, CLASS I, BMI 30.0-34.9 (SEE ACTUAL BMI): Status: ACTIVE | Noted: 2019-06-24

## 2019-06-24 PROBLEM — R97.20 ELEVATED PSA: Status: RESOLVED | Noted: 2018-11-25 | Resolved: 2019-06-24

## 2019-06-24 PROCEDURE — 4040F PNEUMOC VAC/ADMIN/RCVD: CPT | Performed by: INTERNAL MEDICINE

## 2019-06-24 PROCEDURE — 1123F ACP DISCUSS/DSCN MKR DOCD: CPT | Performed by: INTERNAL MEDICINE

## 2019-06-24 PROCEDURE — G8417 CALC BMI ABV UP PARAM F/U: HCPCS | Performed by: INTERNAL MEDICINE

## 2019-06-24 PROCEDURE — G8427 DOCREV CUR MEDS BY ELIG CLIN: HCPCS | Performed by: INTERNAL MEDICINE

## 2019-06-24 PROCEDURE — 1036F TOBACCO NON-USER: CPT | Performed by: INTERNAL MEDICINE

## 2019-06-24 PROCEDURE — 99213 OFFICE O/P EST LOW 20 MIN: CPT | Performed by: INTERNAL MEDICINE

## 2019-06-24 RX ORDER — HYDROCORTISONE BUTYRATE 1 MG/G
OINTMENT TOPICAL
Qty: 15 G | Refills: 0 | Status: SHIPPED | OUTPATIENT
Start: 2019-06-24 | End: 2020-03-26

## 2019-06-24 NOTE — PATIENT INSTRUCTIONS
get more details on the specific medicines your doctor prescribes. When should you call for help? Watch closely for changes in your health, and be sure to contact your doctor if:    · You have any symptoms of diabetes. These may include:  ? Being thirsty more often. ? Urinating more. ? Being hungrier. ? Losing weight. ? Being very tired. ? Having blurry vision.     · You have a wound that will not heal.     · You have an infection that will not go away.     · You have problems with your blood pressure.     · You want more information about diabetes and how you can keep from getting it. Where can you learn more? Go to https://EP[employer] Research & Diagnosticspepiceweb.ABB. org and sign in to your [employer] account. Enter I222 in the [employer] box to learn more about \"Prediabetes: Care Instructions. \"     If you do not have an account, please click on the \"Sign Up Now\" link. Current as of: July 25, 2018  Content Version: 12.0  © 3920-3453 Healthwise, Incorporated. Care instructions adapted under license by [facility] ([facility]). If you have questions about a medical condition or this instruction, always ask your healthcare professional. [place] [address] any warranty or liability for your use of this information. Patient Education        Gastroesophageal Reflux Disease (GERD): Care Instructions  Your Care Instructions    Gastroesophageal reflux disease (GERD) is the backward flow of stomach acid into the esophagus. The esophagus is the tube that leads from your throat to your stomach. A one-way valve prevents the stomach acid from moving up into this tube. When you have GERD, this valve does not close tightly enough. If you have mild GERD symptoms including heartburn, you may be able to control the problem with antacids or over-the-counter medicine. Changing your diet, losing weight, and making other lifestyle changes can also help reduce symptoms.   Follow-up care is a key part of your treatment and safety. Be sure to make and go to all appointments, and call your doctor if you are having problems. It's also a good idea to know your test results and keep a list of the medicines you take. How can you care for yourself at home? · Take your medicines exactly as prescribed. Call your doctor if you think you are having a problem with your medicine. · Your doctor may recommend over-the-counter medicine. For mild or occasional indigestion, antacids, such as Tums, Gaviscon, Mylanta, or Maalox, may help. Your doctor also may recommend over-the-counter acid reducers, such as Pepcid AC, Tagamet HB, Zantac 75, or Prilosec. Read and follow all instructions on the label. If you use these medicines often, talk with your doctor. · Change your eating habits. ? It's best to eat several small meals instead of two or three large meals. ? After you eat, wait 2 to 3 hours before you lie down. ? Chocolate, mint, and alcohol can make GERD worse. ? Spicy foods, foods that have a lot of acid (like tomatoes and oranges), and coffee can make GERD symptoms worse in some people. If your symptoms are worse after you eat a certain food, you may want to stop eating that food to see if your symptoms get better. · Do not smoke or chew tobacco. Smoking can make GERD worse. If you need help quitting, talk to your doctor about stop-smoking programs and medicines. These can increase your chances of quitting for good. · If you have GERD symptoms at night, raise the head of your bed 6 to 8 inches by putting the frame on blocks or placing a foam wedge under the head of your mattress. (Adding extra pillows does not work.)  · Do not wear tight clothing around your middle. · Lose weight if you need to. Losing just 5 to 10 pounds can help. When should you call for help? Call your doctor now or seek immediate medical care if:    · You have new or different belly pain.     · Your stools are black and tarlike or have streaks of blood.  Watch closely for changes in your health, and be sure to contact your doctor if:    · Your symptoms have not improved after 2 days.     · Food seems to catch in your throat or chest.   Where can you learn more? Go to https://BetyahpeKontroneweb.Pumodo. org and sign in to your [employer] account. Enter H030 in the [employer] box to learn more about \"Gastroesophageal Reflux Disease (GERD): Care Instructions. \"     If you do not have an account, please click on the \"Sign Up Now\" link. Current as of: November 7, 2018  Content Version: 12.0  © 4351-2414 Healthwise, Incorporated. Care instructions adapted under license by [facility] ([facility]). If you have questions about a medical condition or this instruction, always ask your healthcare professional. [address] any warranty or liability for your use of this information.        continue nice weight loss  If cough returns call for prilosec  Follow up nov or dec for recheck

## 2019-06-24 NOTE — PROGRESS NOTES
HPI: [facility] presents for chronic cough, prediabetes, weight. Chronic health issues include history melanoma, partial colectomy and cystectomy for diverticulitis and fissure, aortic stenosis, colonic polyps, hyperlipidemia, prostatism, hypertension, A1c 6.4, BMI 35. A1c 6.4 last month. Has started exercise diet. Cut out carbs   Weight is down 17 pounds. Feels good. Sleeping well. Cough is resolved. Cut back on alcohol. Chronic cough improved with weight loss and Prilosec. Did not use his nasal spray. Chest x-ray unremarkable for pleural plaque. He was concerned with possible asbestosis. History of tobacco.  Denies any chronic sinus symptoms. BMI is down 2 points     Hx melanoma. Follows with derm yearly. sept 2019.       Hypertension. Echocardiogram 2018 with trivial aortic mitral and tricuspid regurgitation. Ejection fraction 60-65%. Mild increased left ventricular wall thickness. Mild aortic stenosis. No syncope, exercise intolerance or edema. No TIA-like symptoms. Checks blood pressure. . Reports stress thallium normal in 2005. Denies syncope or presyncope is on lisinopril hydrochlorothiazide. Vytorin. LDL 67     History diverticulosis partial colectomy and cystectomy for fistulas. Occasional left lower quadrant discomfort. No blood in the stool or vomiting. Last colonoscopy August 2013. No GE reflux. No constipation.     Gout well-controlled with allopurinol. Last flare 94. Recent elevation of PSA now down to 1.6 and follows with urology. Recheck in 6 months. Positive Flomax.          Specialists    [facility]     [facility]      PMH:   tonsillecomtoy   hernia repair left   fatty tumor forearm  Sigmoid colectomy and blader repair. Partial cystectomy  Ventral hernia repair with mesh  Melanoma      FH: - melanoma,colon, prostate cancer       SH : History tobacco. Positive alcohol. No recreational drugs. Retired. .     Review of systems: Up-to-date eye exams.  Not going to the dentist. No falls. Denies any wheezing. Occasional bronchitis. No chest pain palpitations lower extremity edema. Denies any GE reflux bloody stools constipation. Positive prostatism. Nocturia ×2. Rare knee discomfort. Follows with dermatology yearly.       Constitutional, ent, CV, respiratory, GI, , joint, skin, allergic and psychiatric ROS reviewed and negative except for above    Allergies   Allergen Reactions    Guaifenesin & Derivatives      Nervous.  Metamucil [Psyllium]      Chest pain.  Norflex Tablets [Orphenadrine]      Urination issues.        Outpatient Medications Marked as Taking for the 6/24/19 encounter (Office Visit) with Jayden Kurtz MD   Medication Sig Dispense Refill    hydrocortisone (LOCOID) 0.1 % OINT oitment apply up to bid prn rash 15 g 0    ezetimibe-simvastatin (VYTORIN) 10-40 MG per tablet TAKE ONE TABLET BY MOUTH DAILY 90 tablet 3    lisinopril-hydrochlorothiazide (PRINZIDE;ZESTORETIC) 20-12.5 MG per tablet 2 po q day to replace prior separate medication prescription 180 tablet 1    tamsulosin (FLOMAX) 0.4 MG capsule TAKE ONE CAPSULE BY MOUTH DAILY 90 capsule 0    allopurinol (ZYLOPRIM) 300 MG tablet TAKE ONE TABLET BY MOUTH DAILY 90 tablet 0             Past Medical History:   Diagnosis Date    Aortic stenosis, mild 03/27/2018    Echo 3/30/18 - mild  with preserved EF    Colitis     Diverticulitis     Diverticulosis     Elevated liver enzymes 11/19/2018    Essential hypertension, benign     Gout     Hyperlipidemia     Malignant melanoma of neck ([facility] [address]     PVC's (premature ventricular contractions)        Past Surgical History:   Procedure Laterality Date    BLADDER REPAIR  7/23/1998    partial cystectomy    COLECTOMY      COLON SURGERY      COLONOSCOPY      HERNIA REPAIR  1969, 1999, 2005    SKIN BIOPSY      SKIN CANCER EXCISION  9/30/2013    TONSILLECTOMY  1947    TUMOR REMOVAL  1970    right forearm             Family History   Problem Relation Age of Onset    Heart Failure Father 66    Stroke Mother 80    Other Sister 21        mastoids    Heart Failure Brother     Heart Failure Brother 68        older brother   Arland Begin Sister 68        oldest sister/breast/brain    Cancer Sister 76           Review of Systems        Objective     /69   Pulse 80   Resp 16   Ht 5' 7\" (1.702 m)   Wt 219 lb (99.3 kg)   SpO2 96% Comment: RA  BMI 34.30 kg/m²     @LASTSAO2(3)@    Wt Readings from Last 3 Encounters:   06/24/19 219 lb (99.3 kg)   05/20/19 236 lb (107 kg)   11/19/18 236 lb (107 kg)       Physical Exam     NAD alert and cooperative  HEENT: Small hypopharynx. TMs unremarkable. No anterior cervical adenopathy. Lungs few rales left base. No wheezes rales or rhonchi. Good inspiration. Cardiovascular exam 1/6 murmur heard throughout the precordium. Left and right upper sternal borders. No radiation to the axilla. No ectopy. Positive obesity. No hepatosplenomegaly or epigastric tenderness. Good range of motion of the hips. No crepitus of the knees. Good pulses lower extremity. No cyanosis clubbing or edema. No suspicious skin lesions.   Multiple hyperpigmented nodules      Chemistry        Component Value Date/Time     11/19/2018 0940    K 4.3 11/19/2018 0940     11/19/2018 0940    CO2 22 11/19/2018 0940    BUN 23 (H) 11/19/2018 0940    CREATININE 1.2 11/19/2018 0940        Component Value Date/Time    CALCIUM 9.5 11/19/2018 0940    ALKPHOS 93 05/20/2019 0952    AST 38 (H) 05/20/2019 0952    ALT 41 (H) 05/20/2019 0952    BILITOT 0.8 05/20/2019 0952            Lab Results   Component Value Date    WBC 6.7 11/19/2018    HGB 15.3 11/19/2018    HCT 45.8 11/19/2018    MCV 95.9 11/19/2018     11/19/2018     Lab Results   Component Value Date    LABA1C 6.4 05/20/2019     Lab Results   Component Value Date    .0 05/20/2019     Lab Results   Component Value Date    LABA1C 6.4 05/20/2019     No components found

## 2019-07-18 DIAGNOSIS — M10.9 GOUT OF FOOT, UNSPECIFIED CAUSE, UNSPECIFIED CHRONICITY, UNSPECIFIED LATERALITY: ICD-10-CM

## 2019-07-18 DIAGNOSIS — R35.1 NOCTURIA: ICD-10-CM

## 2019-07-18 RX ORDER — TAMSULOSIN HYDROCHLORIDE 0.4 MG/1
CAPSULE ORAL
Qty: 90 CAPSULE | Refills: 0 | Status: SHIPPED | OUTPATIENT
Start: 2019-07-18 | End: 2019-10-17 | Stop reason: SDUPTHER

## 2019-07-18 RX ORDER — ALLOPURINOL 300 MG/1
TABLET ORAL
Qty: 90 TABLET | Refills: 4 | Status: SHIPPED | OUTPATIENT
Start: 2019-07-18 | End: 2020-09-28

## 2019-10-17 DIAGNOSIS — R35.1 NOCTURIA: ICD-10-CM

## 2019-10-17 RX ORDER — TAMSULOSIN HYDROCHLORIDE 0.4 MG/1
CAPSULE ORAL
Qty: 90 CAPSULE | Refills: 0 | Status: SHIPPED | OUTPATIENT
Start: 2019-10-17 | End: 2020-01-13

## 2019-11-05 DIAGNOSIS — I10 ESSENTIAL HYPERTENSION, BENIGN: Primary | ICD-10-CM

## 2019-11-05 RX ORDER — LISINOPRIL AND HYDROCHLOROTHIAZIDE 20; 12.5 MG/1; MG/1
TABLET ORAL
Qty: 180 TABLET | Refills: 0 | Status: SHIPPED | OUTPATIENT
Start: 2019-11-05 | End: 2020-02-03

## 2019-12-03 ENCOUNTER — HOSPITAL ENCOUNTER (OUTPATIENT)
Age: 79
Discharge: HOME | End: 2019-12-03
Payer: MEDICARE

## 2019-12-03 ENCOUNTER — TELEPHONE (OUTPATIENT)
Dept: FAMILY MEDICINE CLINIC | Age: 79
End: 2019-12-03

## 2019-12-03 ENCOUNTER — HOSPITAL ENCOUNTER (OUTPATIENT)
Dept: GENERAL RADIOLOGY | Age: 79
Discharge: HOME | End: 2019-12-03
Payer: MEDICARE

## 2019-12-03 ENCOUNTER — OFFICE VISIT (OUTPATIENT)
Dept: FAMILY MEDICINE CLINIC | Age: 79
End: 2019-12-03
Payer: MEDICARE

## 2019-12-03 VITALS
WEIGHT: 202 LBS | DIASTOLIC BLOOD PRESSURE: 72 MMHG | SYSTOLIC BLOOD PRESSURE: 132 MMHG | HEART RATE: 77 BPM | BODY MASS INDEX: 31.71 KG/M2 | OXYGEN SATURATION: 97 % | RESPIRATION RATE: 16 BRPM | HEIGHT: 67 IN

## 2019-12-03 DIAGNOSIS — J02.9 SORE THROAT: Primary | ICD-10-CM

## 2019-12-03 DIAGNOSIS — E78.00 PURE HYPERCHOLESTEROLEMIA: ICD-10-CM

## 2019-12-03 DIAGNOSIS — I35.0 AORTIC STENOSIS, MILD: ICD-10-CM

## 2019-12-03 DIAGNOSIS — I10 ESSENTIAL HYPERTENSION, BENIGN: ICD-10-CM

## 2019-12-03 DIAGNOSIS — R06.89 ABNORMAL BREATH SOUNDS: ICD-10-CM

## 2019-12-03 DIAGNOSIS — R73.03 PREDIABETES: ICD-10-CM

## 2019-12-03 DIAGNOSIS — M54.9 DORSALGIA, UNSPECIFIED: ICD-10-CM

## 2019-12-03 PROCEDURE — 4040F PNEUMOC VAC/ADMIN/RCVD: CPT | Performed by: INTERNAL MEDICINE

## 2019-12-03 PROCEDURE — 1123F ACP DISCUSS/DSCN MKR DOCD: CPT | Performed by: INTERNAL MEDICINE

## 2019-12-03 PROCEDURE — 99213 OFFICE O/P EST LOW 20 MIN: CPT | Performed by: INTERNAL MEDICINE

## 2019-12-03 PROCEDURE — G8484 FLU IMMUNIZE NO ADMIN: HCPCS | Performed by: INTERNAL MEDICINE

## 2019-12-03 PROCEDURE — 71046 X-RAY EXAM CHEST 2 VIEWS: CPT

## 2019-12-03 PROCEDURE — G8427 DOCREV CUR MEDS BY ELIG CLIN: HCPCS | Performed by: INTERNAL MEDICINE

## 2019-12-03 PROCEDURE — G8417 CALC BMI ABV UP PARAM F/U: HCPCS | Performed by: INTERNAL MEDICINE

## 2019-12-03 PROCEDURE — 1036F TOBACCO NON-USER: CPT | Performed by: INTERNAL MEDICINE

## 2019-12-03 RX ORDER — CAPSAICIN 0.07 G/100G
CREAM TOPICAL
Qty: 56 G | Refills: 0 | Status: SHIPPED | OUTPATIENT
Start: 2019-12-03 | End: 2020-01-02

## 2019-12-03 RX ORDER — AZITHROMYCIN 250 MG/1
250 TABLET, FILM COATED ORAL SEE ADMIN INSTRUCTIONS
Qty: 6 TABLET | Refills: 0 | Status: SHIPPED | OUTPATIENT
Start: 2019-12-03 | End: 2022-05-16 | Stop reason: SDUPTHER

## 2019-12-03 RX ORDER — PREDNISONE 10 MG/1
10 TABLET ORAL 2 TIMES DAILY
Qty: 10 TABLET | Refills: 0 | Status: SHIPPED | OUTPATIENT
Start: 2019-12-03 | End: 2022-05-16 | Stop reason: SDUPTHER

## 2019-12-03 RX ORDER — PREDNISONE 10 MG/1
10 TABLET ORAL 2 TIMES DAILY
Qty: 10 TABLET | Refills: 0 | Status: SHIPPED | OUTPATIENT
Start: 2019-12-03 | End: 2019-12-03 | Stop reason: SDUPTHER

## 2019-12-17 ENCOUNTER — OFFICE VISIT (OUTPATIENT)
Dept: FAMILY MEDICINE CLINIC | Age: 79
End: 2019-12-17
Payer: MEDICARE

## 2019-12-17 VITALS
OXYGEN SATURATION: 97 % | SYSTOLIC BLOOD PRESSURE: 116 MMHG | DIASTOLIC BLOOD PRESSURE: 66 MMHG | HEART RATE: 86 BPM | RESPIRATION RATE: 16 BRPM | HEIGHT: 67 IN | WEIGHT: 198 LBS | BODY MASS INDEX: 31.08 KG/M2

## 2019-12-17 DIAGNOSIS — M10.9 GOUT OF FOOT, UNSPECIFIED CAUSE, UNSPECIFIED CHRONICITY, UNSPECIFIED LATERALITY: ICD-10-CM

## 2019-12-17 DIAGNOSIS — E66.9 OBESITY, CLASS I, BMI 30.0-34.9 (SEE ACTUAL BMI): ICD-10-CM

## 2019-12-17 DIAGNOSIS — R74.8 ELEVATED LIVER ENZYMES: ICD-10-CM

## 2019-12-17 DIAGNOSIS — E78.00 PURE HYPERCHOLESTEROLEMIA: ICD-10-CM

## 2019-12-17 DIAGNOSIS — Z11.59 ENCOUNTER FOR SCREENING FOR OTHER VIRAL DISEASES: ICD-10-CM

## 2019-12-17 DIAGNOSIS — R73.03 PREDIABETES: ICD-10-CM

## 2019-12-17 DIAGNOSIS — R06.89 ABNORMAL BREATH SOUNDS: ICD-10-CM

## 2019-12-17 DIAGNOSIS — I10 ESSENTIAL HYPERTENSION, BENIGN: Primary | ICD-10-CM

## 2019-12-17 DIAGNOSIS — I35.0 AORTIC STENOSIS, MILD: ICD-10-CM

## 2019-12-17 DIAGNOSIS — C43.4 MALIGNANT MELANOMA OF NECK (HCC): ICD-10-CM

## 2019-12-17 DIAGNOSIS — N40.0 PROSTATISM: ICD-10-CM

## 2019-12-17 LAB
A/G RATIO: 2 (ref 1.1–2.2)
ALBUMIN SERPL-MCNC: 4.6 G/DL (ref 3.4–5)
ALP BLD-CCNC: 84 U/L (ref 40–129)
ALT SERPL-CCNC: 32 U/L (ref 10–40)
ANION GAP SERPL CALCULATED.3IONS-SCNC: 17 MMOL/L (ref 3–16)
AST SERPL-CCNC: 29 U/L (ref 15–37)
BILIRUB SERPL-MCNC: 0.8 MG/DL (ref 0–1)
BUN BLDV-MCNC: 35 MG/DL (ref 7–20)
CALCIUM SERPL-MCNC: 9.6 MG/DL (ref 8.3–10.6)
CHLORIDE BLD-SCNC: 99 MMOL/L (ref 99–110)
CHOLESTEROL, TOTAL: 120 MG/DL (ref 0–199)
CO2: 24 MMOL/L (ref 21–32)
CREAT SERPL-MCNC: 1.3 MG/DL (ref 0.8–1.3)
CREATININE URINE: 110.7 MG/DL (ref 39–259)
GFR AFRICAN AMERICAN: >60
GFR NON-AFRICAN AMERICAN: 54
GLOBULIN: 2.3 G/DL
GLUCOSE BLD-MCNC: 99 MG/DL (ref 70–99)
HDLC SERPL-MCNC: 40 MG/DL (ref 40–60)
HEPATITIS B SURFACE ANTIGEN INTERPRETATION: NORMAL
LDL CHOLESTEROL CALCULATED: 58 MG/DL
MICROALBUMIN UR-MCNC: <1.2 MG/DL
MICROALBUMIN/CREAT UR-RTO: NORMAL MG/G (ref 0–30)
POTASSIUM SERPL-SCNC: 4 MMOL/L (ref 3.5–5.1)
SODIUM BLD-SCNC: 140 MMOL/L (ref 136–145)
TOTAL PROTEIN: 6.9 G/DL (ref 6.4–8.2)
TRIGL SERPL-MCNC: 110 MG/DL (ref 0–150)
URIC ACID, SERUM: 6.6 MG/DL (ref 3.5–7.2)
VLDLC SERPL CALC-MCNC: 22 MG/DL

## 2019-12-17 PROCEDURE — G8484 FLU IMMUNIZE NO ADMIN: HCPCS | Performed by: INTERNAL MEDICINE

## 2019-12-17 PROCEDURE — 99214 OFFICE O/P EST MOD 30 MIN: CPT | Performed by: INTERNAL MEDICINE

## 2019-12-17 PROCEDURE — G8427 DOCREV CUR MEDS BY ELIG CLIN: HCPCS | Performed by: INTERNAL MEDICINE

## 2019-12-17 PROCEDURE — G8417 CALC BMI ABV UP PARAM F/U: HCPCS | Performed by: INTERNAL MEDICINE

## 2019-12-17 PROCEDURE — 4040F PNEUMOC VAC/ADMIN/RCVD: CPT | Performed by: INTERNAL MEDICINE

## 2019-12-17 PROCEDURE — 1036F TOBACCO NON-USER: CPT | Performed by: INTERNAL MEDICINE

## 2019-12-17 PROCEDURE — 36415 COLL VENOUS BLD VENIPUNCTURE: CPT | Performed by: INTERNAL MEDICINE

## 2019-12-17 PROCEDURE — 1123F ACP DISCUSS/DSCN MKR DOCD: CPT | Performed by: INTERNAL MEDICINE

## 2019-12-18 LAB
ESTIMATED AVERAGE GLUCOSE: 116.9 MG/DL
HBA1C MFR BLD: 5.7 %

## 2019-12-20 ENCOUNTER — APPOINTMENT (OUTPATIENT)
Dept: CT IMAGING | Age: 79
End: 2019-12-20
Payer: MEDICARE

## 2019-12-20 ENCOUNTER — OFFICE VISIT (OUTPATIENT)
Dept: FAMILY MEDICINE CLINIC | Age: 79
End: 2019-12-20

## 2019-12-20 ENCOUNTER — HOSPITAL ENCOUNTER (EMERGENCY)
Age: 79
Discharge: HOME | End: 2019-12-20
Payer: MEDICARE

## 2019-12-20 VITALS
WEIGHT: 202.4 LBS | RESPIRATION RATE: 17 BRPM | BODY MASS INDEX: 31.77 KG/M2 | DIASTOLIC BLOOD PRESSURE: 63 MMHG | TEMPERATURE: 97.7 F | HEIGHT: 67 IN | OXYGEN SATURATION: 96 % | SYSTOLIC BLOOD PRESSURE: 119 MMHG | HEART RATE: 76 BPM

## 2019-12-20 VITALS
WEIGHT: 201.94 LBS | SYSTOLIC BLOOD PRESSURE: 118 MMHG | OXYGEN SATURATION: 97 % | RESPIRATION RATE: 14 BRPM | HEART RATE: 76 BPM | DIASTOLIC BLOOD PRESSURE: 74 MMHG | TEMPERATURE: 97.4 F | BODY MASS INDEX: 31.63 KG/M2

## 2019-12-20 DIAGNOSIS — R91.1 PULMONARY NODULE, LEFT: ICD-10-CM

## 2019-12-20 DIAGNOSIS — R10.32 LLQ PAIN: Primary | ICD-10-CM

## 2019-12-20 DIAGNOSIS — K57.32 DIVERTICULITIS OF COLON: Primary | ICD-10-CM

## 2019-12-20 LAB
A/G RATIO: 1.4 (ref 1.1–2.2)
ALBUMIN SERPL-MCNC: 4.2 G/DL (ref 3.4–5)
ALP BLD-CCNC: 80 U/L (ref 40–129)
ALT SERPL-CCNC: 23 U/L (ref 10–40)
ANION GAP SERPL CALCULATED.3IONS-SCNC: 16 MMOL/L (ref 3–16)
AST SERPL-CCNC: 22 U/L (ref 15–37)
BASOPHILS ABSOLUTE: 0.1 K/UL (ref 0–0.2)
BASOPHILS RELATIVE PERCENT: 0.8 %
BILIRUB SERPL-MCNC: 0.7 MG/DL (ref 0–1)
BILIRUBIN URINE: NEGATIVE
BLOOD, URINE: NEGATIVE
BUN BLDV-MCNC: 26 MG/DL (ref 7–20)
CALCIUM SERPL-MCNC: 9.3 MG/DL (ref 8.3–10.6)
CHLORIDE BLD-SCNC: 98 MMOL/L (ref 99–110)
CLARITY: CLEAR
CO2: 25 MMOL/L (ref 21–32)
COLOR: YELLOW
CREAT SERPL-MCNC: 1.2 MG/DL (ref 0.8–1.3)
EOSINOPHILS ABSOLUTE: 0.4 K/UL (ref 0–0.6)
EOSINOPHILS RELATIVE PERCENT: 3.3 %
GFR AFRICAN AMERICAN: >60
GFR NON-AFRICAN AMERICAN: 59
GLOBULIN: 3 G/DL
GLUCOSE BLD-MCNC: 81 MG/DL (ref 70–99)
GLUCOSE URINE: NEGATIVE MG/DL
HCT VFR BLD CALC: 44 % (ref 40.5–52.5)
HEMOGLOBIN: 14.6 G/DL (ref 13.5–17.5)
KETONES, URINE: NEGATIVE MG/DL
LEUKOCYTE ESTERASE, URINE: NEGATIVE
LIPASE: 40 U/L (ref 13–60)
LYMPHOCYTES ABSOLUTE: 1.8 K/UL (ref 1–5.1)
LYMPHOCYTES RELATIVE PERCENT: 13.6 %
MCH RBC QN AUTO: 31.9 PG (ref 26–34)
MCHC RBC AUTO-ENTMCNC: 33.1 G/DL (ref 31–36)
MCV RBC AUTO: 96.4 FL (ref 80–100)
MICROSCOPIC EXAMINATION: NORMAL
MONOCYTES ABSOLUTE: 0.9 K/UL (ref 0–1.3)
MONOCYTES RELATIVE PERCENT: 6.7 %
NEUTROPHILS ABSOLUTE: 9.8 K/UL (ref 1.7–7.7)
NEUTROPHILS RELATIVE PERCENT: 75.6 %
NITRITE, URINE: NEGATIVE
PDW BLD-RTO: 14.9 % (ref 12.4–15.4)
PH UA: 5 (ref 5–8)
PLATELET # BLD: 206 K/UL (ref 135–450)
PMV BLD AUTO: 8.7 FL (ref 5–10.5)
POTASSIUM SERPL-SCNC: 4.2 MMOL/L (ref 3.5–5.1)
PROTEIN UA: NEGATIVE MG/DL
RBC # BLD: 4.56 M/UL (ref 4.2–5.9)
SODIUM BLD-SCNC: 139 MMOL/L (ref 136–145)
SPECIFIC GRAVITY UA: 1.01 (ref 1–1.03)
TOTAL PROTEIN: 7.2 G/DL (ref 6.4–8.2)
URINE REFLEX TO CULTURE: NORMAL
URINE TYPE: NORMAL
UROBILINOGEN, URINE: 0.2 E.U./DL
WBC # BLD: 12.9 K/UL (ref 4–11)

## 2019-12-20 PROCEDURE — 96374 THER/PROPH/DIAG INJ IV PUSH: CPT

## 2019-12-20 PROCEDURE — 6360000002 HC RX W HCPCS: Performed by: PHYSICIAN ASSISTANT

## 2019-12-20 PROCEDURE — G8484 FLU IMMUNIZE NO ADMIN: HCPCS | Performed by: FAMILY MEDICINE

## 2019-12-20 PROCEDURE — G8417 CALC BMI ABV UP PARAM F/U: HCPCS | Performed by: FAMILY MEDICINE

## 2019-12-20 PROCEDURE — 80053 COMPREHEN METABOLIC PANEL: CPT

## 2019-12-20 PROCEDURE — 99284 EMERGENCY DEPT VISIT MOD MDM: CPT

## 2019-12-20 PROCEDURE — G8427 DOCREV CUR MEDS BY ELIG CLIN: HCPCS | Performed by: FAMILY MEDICINE

## 2019-12-20 PROCEDURE — 74177 CT ABD & PELVIS W/CONTRAST: CPT

## 2019-12-20 PROCEDURE — 96375 TX/PRO/DX INJ NEW DRUG ADDON: CPT

## 2019-12-20 PROCEDURE — 96361 HYDRATE IV INFUSION ADD-ON: CPT

## 2019-12-20 PROCEDURE — 83690 ASSAY OF LIPASE: CPT

## 2019-12-20 PROCEDURE — 6360000004 HC RX CONTRAST MEDICATION: Performed by: PHYSICIAN ASSISTANT

## 2019-12-20 PROCEDURE — 4040F PNEUMOC VAC/ADMIN/RCVD: CPT | Performed by: FAMILY MEDICINE

## 2019-12-20 PROCEDURE — 85025 COMPLETE CBC W/AUTO DIFF WBC: CPT

## 2019-12-20 PROCEDURE — 2580000003 HC RX 258: Performed by: PHYSICIAN ASSISTANT

## 2019-12-20 PROCEDURE — 6370000000 HC RX 637 (ALT 250 FOR IP): Performed by: PHYSICIAN ASSISTANT

## 2019-12-20 PROCEDURE — 1123F ACP DISCUSS/DSCN MKR DOCD: CPT | Performed by: FAMILY MEDICINE

## 2019-12-20 PROCEDURE — 81003 URINALYSIS AUTO W/O SCOPE: CPT

## 2019-12-20 PROCEDURE — 1036F TOBACCO NON-USER: CPT | Performed by: FAMILY MEDICINE

## 2019-12-20 RX ORDER — METRONIDAZOLE 500 MG/1
500 TABLET ORAL 3 TIMES DAILY
Qty: 30 TABLET | Refills: 0 | Status: SHIPPED | OUTPATIENT
Start: 2019-12-20 | End: 2019-12-30

## 2019-12-20 RX ORDER — OXYCODONE HYDROCHLORIDE AND ACETAMINOPHEN 5; 325 MG/1; MG/1
1 TABLET ORAL EVERY 6 HOURS PRN
Qty: 10 TABLET | Refills: 0 | Status: SHIPPED | OUTPATIENT
Start: 2019-12-20 | End: 2019-12-23

## 2019-12-20 RX ORDER — CIPROFLOXACIN 500 MG/1
500 TABLET, FILM COATED ORAL ONCE
Status: COMPLETED | OUTPATIENT
Start: 2019-12-20 | End: 2019-12-20

## 2019-12-20 RX ORDER — OXYCODONE HYDROCHLORIDE AND ACETAMINOPHEN 5; 325 MG/1; MG/1
1 TABLET ORAL ONCE
Status: COMPLETED | OUTPATIENT
Start: 2019-12-20 | End: 2019-12-20

## 2019-12-20 RX ORDER — KETOROLAC TROMETHAMINE 30 MG/ML
15 INJECTION, SOLUTION INTRAMUSCULAR; INTRAVENOUS ONCE
Status: COMPLETED | OUTPATIENT
Start: 2019-12-20 | End: 2019-12-20

## 2019-12-20 RX ORDER — CIPROFLOXACIN 500 MG/1
500 TABLET, FILM COATED ORAL 2 TIMES DAILY
Qty: 20 TABLET | Refills: 0 | Status: SHIPPED | OUTPATIENT
Start: 2019-12-20 | End: 2019-12-30

## 2019-12-20 RX ORDER — ONDANSETRON 4 MG/1
4 TABLET, ORALLY DISINTEGRATING ORAL EVERY 8 HOURS PRN
Qty: 10 TABLET | Refills: 0 | Status: SHIPPED | OUTPATIENT
Start: 2019-12-20 | End: 2020-06-29

## 2019-12-20 RX ORDER — 0.9 % SODIUM CHLORIDE 0.9 %
1000 INTRAVENOUS SOLUTION INTRAVENOUS ONCE
Status: COMPLETED | OUTPATIENT
Start: 2019-12-20 | End: 2019-12-20

## 2019-12-20 RX ORDER — MORPHINE SULFATE 4 MG/ML
4 INJECTION, SOLUTION INTRAMUSCULAR; INTRAVENOUS ONCE
Status: COMPLETED | OUTPATIENT
Start: 2019-12-20 | End: 2019-12-20

## 2019-12-20 RX ORDER — METRONIDAZOLE 500 MG/1
500 TABLET ORAL ONCE
Status: COMPLETED | OUTPATIENT
Start: 2019-12-20 | End: 2019-12-20

## 2019-12-20 RX ORDER — ONDANSETRON 2 MG/ML
4 INJECTION INTRAMUSCULAR; INTRAVENOUS ONCE
Status: COMPLETED | OUTPATIENT
Start: 2019-12-20 | End: 2019-12-20

## 2019-12-20 RX ORDER — CIPROFLOXACIN 2 MG/ML
400 INJECTION, SOLUTION INTRAVENOUS ONCE
Status: DISCONTINUED | OUTPATIENT
Start: 2019-12-20 | End: 2019-12-20

## 2019-12-20 RX ADMIN — KETOROLAC TROMETHAMINE 15 MG: 30 INJECTION, SOLUTION INTRAMUSCULAR at 19:29

## 2019-12-20 RX ADMIN — IOPAMIDOL 75 ML: 755 INJECTION, SOLUTION INTRAVENOUS at 18:43

## 2019-12-20 RX ADMIN — SODIUM CHLORIDE 1000 ML: 9 INJECTION, SOLUTION INTRAVENOUS at 18:08

## 2019-12-20 RX ADMIN — METRONIDAZOLE 500 MG: 500 TABLET, FILM COATED ORAL at 19:39

## 2019-12-20 RX ADMIN — MORPHINE SULFATE 4 MG: 4 INJECTION, SOLUTION INTRAMUSCULAR; INTRAVENOUS at 18:11

## 2019-12-20 RX ADMIN — ONDANSETRON 4 MG: 2 INJECTION INTRAMUSCULAR; INTRAVENOUS at 18:10

## 2019-12-20 RX ADMIN — CIPROFLOXACIN 500 MG: 500 TABLET, FILM COATED ORAL at 19:39

## 2019-12-20 RX ADMIN — CIPROFLOXACIN 400 MG: 2 INJECTION, SOLUTION INTRAVENOUS at 19:29

## 2019-12-20 RX ADMIN — OXYCODONE HYDROCHLORIDE AND ACETAMINOPHEN 1 TABLET: 5; 325 TABLET ORAL at 19:29

## 2019-12-20 ASSESSMENT — PAIN DESCRIPTION - PAIN TYPE: TYPE: ACUTE PAIN

## 2019-12-20 ASSESSMENT — PAIN SCALES - GENERAL
PAINLEVEL_OUTOF10: 6
PAINLEVEL_OUTOF10: 6
PAINLEVEL_OUTOF10: 4
PAINLEVEL_OUTOF10: 4

## 2019-12-20 ASSESSMENT — PAIN DESCRIPTION - PROGRESSION: CLINICAL_PROGRESSION: GRADUALLY WORSENING

## 2019-12-20 ASSESSMENT — ENCOUNTER SYMPTOMS
DIARRHEA: 0
BACK PAIN: 0
SHORTNESS OF BREATH: 0
ABDOMINAL PAIN: 1
NAUSEA: 0
VOMITING: 0
COLOR CHANGE: 0
CONSTIPATION: 0

## 2019-12-20 ASSESSMENT — PAIN DESCRIPTION - FREQUENCY: FREQUENCY: CONTINUOUS

## 2019-12-20 ASSESSMENT — PAIN DESCRIPTION - ORIENTATION: ORIENTATION: LEFT;UPPER

## 2019-12-20 ASSESSMENT — PAIN - FUNCTIONAL ASSESSMENT
PAIN_FUNCTIONAL_ASSESSMENT: PREVENTS OR INTERFERES SOME ACTIVE ACTIVITIES AND ADLS
PAIN_FUNCTIONAL_ASSESSMENT: 0-10

## 2019-12-20 ASSESSMENT — PAIN DESCRIPTION - ONSET: ONSET: PROGRESSIVE

## 2019-12-20 ASSESSMENT — PAIN DESCRIPTION - DESCRIPTORS: DESCRIPTORS: ACHING

## 2019-12-27 PROBLEM — K80.20 CALCULUS OF GALLBLADDER WITHOUT CHOLECYSTITIS WITHOUT OBSTRUCTION: Status: ACTIVE | Noted: 2019-12-27

## 2019-12-27 PROBLEM — R91.1 PULMONARY NODULE: Status: ACTIVE | Noted: 2019-12-27

## 2020-01-13 RX ORDER — TAMSULOSIN HYDROCHLORIDE 0.4 MG/1
CAPSULE ORAL
Qty: 90 CAPSULE | Refills: 1 | Status: SHIPPED | OUTPATIENT
Start: 2020-01-13 | End: 2020-07-14

## 2020-02-03 RX ORDER — LISINOPRIL AND HYDROCHLOROTHIAZIDE 20; 12.5 MG/1; MG/1
TABLET ORAL
Qty: 180 TABLET | Refills: 0 | Status: SHIPPED | OUTPATIENT
Start: 2020-02-03 | End: 2020-05-04

## 2020-03-24 ENCOUNTER — TELEPHONE (OUTPATIENT)
Dept: FAMILY MEDICINE CLINIC | Age: 80
End: 2020-03-24

## 2020-03-24 RX ORDER — BETAMETHASONE DIPROPIONATE 0.05 %
OINTMENT (GRAM) TOPICAL
Qty: 50 G | Refills: 1 | Status: SHIPPED | OUTPATIENT
Start: 2020-03-24 | End: 2020-03-26

## 2020-03-24 NOTE — TELEPHONE ENCOUNTER
Was seeing derm- retired, and was Rx betamethasone dipropionate 30 mL. It is .05%. this is for an itchy scalp. Seeing a new derm, had an appt this past week, and appt was cancelled due to covid-19. Will not be able to see new derm for few months. Wanting to know if you will call this in for him in the meantime. Please advise. Please call into sridevi alas [phone].  Please call Carlyn Hanks back at [phone]

## 2020-03-25 ENCOUNTER — TELEPHONE (OUTPATIENT)
Dept: FAMILY MEDICINE CLINIC | Age: 80
End: 2020-03-25

## 2020-03-25 NOTE — TELEPHONE ENCOUNTER
[facility] called in regarding PT's prescription for betamethasone. They state previously PT has received the lotion and not the cream and PT would prefer that.      Best call back number: [phone]

## 2020-03-26 ENCOUNTER — TELEPHONE (OUTPATIENT)
Dept: FAMILY MEDICINE CLINIC | Age: 80
End: 2020-03-26

## 2020-03-26 RX ORDER — BETAMETHASONE DIPROPIONATE 0.5 MG/G
LOTION TOPICAL
Qty: 60 ML | Refills: 1 | Status: SHIPPED | OUTPATIENT
Start: 2020-03-26 | End: 2020-10-27

## 2020-03-26 NOTE — TELEPHONE ENCOUNTER
[address] [facility] called back in regarding the prescription for betamethasone cream. They want to know if the lotion will be called in for PT.      Best call back number: [phone]

## 2020-04-20 RX ORDER — HYDROCORTISONE BUTYRATE 1 MG/G
OINTMENT TOPICAL
Qty: 15 G | Refills: 0 | Status: SHIPPED | OUTPATIENT
Start: 2020-04-20 | End: 2020-10-27

## 2020-05-04 RX ORDER — LISINOPRIL AND HYDROCHLOROTHIAZIDE 20; 12.5 MG/1; MG/1
TABLET ORAL
Qty: 180 TABLET | Refills: 0 | Status: SHIPPED | OUTPATIENT
Start: 2020-05-04 | End: 2020-07-28

## 2020-06-03 RX ORDER — HYDROCORTISONE 25 MG/G
CREAM TOPICAL
Qty: 28 G | Refills: 1 | Status: SHIPPED | OUTPATIENT
Start: 2020-06-03 | End: 2020-09-11

## 2020-06-10 ENCOUNTER — HOSPITAL ENCOUNTER (OUTPATIENT)
Dept: NON INVASIVE DIAGNOSTICS | Age: 80
Discharge: HOME | End: 2020-06-10
Payer: MEDICARE

## 2020-06-10 LAB
LV EF: 60 %
LVEF MODALITY: NORMAL

## 2020-06-10 PROCEDURE — 93306 TTE W/DOPPLER COMPLETE: CPT

## 2020-06-11 ENCOUNTER — TELEPHONE (OUTPATIENT)
Dept: FAMILY MEDICINE CLINIC | Age: 80
End: 2020-06-11

## 2020-06-29 ENCOUNTER — OFFICE VISIT (OUTPATIENT)
Dept: FAMILY MEDICINE CLINIC | Age: 80
End: 2020-06-29
Payer: MEDICARE

## 2020-06-29 VITALS
DIASTOLIC BLOOD PRESSURE: 68 MMHG | OXYGEN SATURATION: 96 % | HEIGHT: 67 IN | HEART RATE: 86 BPM | RESPIRATION RATE: 16 BRPM | TEMPERATURE: 97.9 F | SYSTOLIC BLOOD PRESSURE: 133 MMHG | BODY MASS INDEX: 32.33 KG/M2 | WEIGHT: 206 LBS

## 2020-06-29 LAB
A/G RATIO: 2.2 (ref 1.1–2.2)
ALBUMIN SERPL-MCNC: 4.9 G/DL (ref 3.4–5)
ALP BLD-CCNC: 86 U/L (ref 40–129)
ALT SERPL-CCNC: 28 U/L (ref 10–40)
ANION GAP SERPL CALCULATED.3IONS-SCNC: 15 MMOL/L (ref 3–16)
AST SERPL-CCNC: 28 U/L (ref 15–37)
BILIRUB SERPL-MCNC: 0.7 MG/DL (ref 0–1)
BUN BLDV-MCNC: 29 MG/DL (ref 7–20)
CALCIUM SERPL-MCNC: 9.6 MG/DL (ref 8.3–10.6)
CHLORIDE BLD-SCNC: 101 MMOL/L (ref 99–110)
CHOLESTEROL, TOTAL: 121 MG/DL (ref 0–199)
CO2: 24 MMOL/L (ref 21–32)
CREAT SERPL-MCNC: 1.4 MG/DL (ref 0.8–1.3)
GFR AFRICAN AMERICAN: 59
GFR NON-AFRICAN AMERICAN: 49
GLOBULIN: 2.2 G/DL
GLUCOSE BLD-MCNC: 81 MG/DL (ref 70–99)
HBA1C MFR BLD: 5.6 %
HCT VFR BLD CALC: 45 % (ref 40.5–52.5)
HDLC SERPL-MCNC: 38 MG/DL (ref 40–60)
HEMOGLOBIN: 14.8 G/DL (ref 13.5–17.5)
LDL CHOLESTEROL CALCULATED: 55 MG/DL
MCH RBC QN AUTO: 31.6 PG (ref 26–34)
MCHC RBC AUTO-ENTMCNC: 32.9 G/DL (ref 31–36)
MCV RBC AUTO: 96.1 FL (ref 80–100)
PDW BLD-RTO: 14.9 % (ref 12.4–15.4)
PLATELET # BLD: 215 K/UL (ref 135–450)
PMV BLD AUTO: 9.1 FL (ref 5–10.5)
POTASSIUM SERPL-SCNC: 4.1 MMOL/L (ref 3.5–5.1)
RBC # BLD: 4.68 M/UL (ref 4.2–5.9)
SODIUM BLD-SCNC: 140 MMOL/L (ref 136–145)
TOTAL PROTEIN: 7.1 G/DL (ref 6.4–8.2)
TRIGL SERPL-MCNC: 139 MG/DL (ref 0–150)
URIC ACID, SERUM: 5.4 MG/DL (ref 3.5–7.2)
VLDLC SERPL CALC-MCNC: 28 MG/DL
WBC # BLD: 8.7 K/UL (ref 4–11)

## 2020-06-29 PROCEDURE — G8427 DOCREV CUR MEDS BY ELIG CLIN: HCPCS | Performed by: INTERNAL MEDICINE

## 2020-06-29 PROCEDURE — 36415 COLL VENOUS BLD VENIPUNCTURE: CPT | Performed by: INTERNAL MEDICINE

## 2020-06-29 PROCEDURE — 4040F PNEUMOC VAC/ADMIN/RCVD: CPT | Performed by: INTERNAL MEDICINE

## 2020-06-29 PROCEDURE — 99214 OFFICE O/P EST MOD 30 MIN: CPT | Performed by: INTERNAL MEDICINE

## 2020-06-29 PROCEDURE — G8417 CALC BMI ABV UP PARAM F/U: HCPCS | Performed by: INTERNAL MEDICINE

## 2020-06-29 PROCEDURE — 1123F ACP DISCUSS/DSCN MKR DOCD: CPT | Performed by: INTERNAL MEDICINE

## 2020-06-29 PROCEDURE — 1036F TOBACCO NON-USER: CPT | Performed by: INTERNAL MEDICINE

## 2020-06-29 PROCEDURE — 83036 HEMOGLOBIN GLYCOSYLATED A1C: CPT | Performed by: INTERNAL MEDICINE

## 2020-06-29 ASSESSMENT — PATIENT HEALTH QUESTIONNAIRE - PHQ9
SUM OF ALL RESPONSES TO PHQ QUESTIONS 1-9: 0
SUM OF ALL RESPONSES TO PHQ9 QUESTIONS 1 & 2: 0
1. LITTLE INTEREST OR PLEASURE IN DOING THINGS: 0
SUM OF ALL RESPONSES TO PHQ QUESTIONS 1-9: 0
2. FEELING DOWN, DEPRESSED OR HOPELESS: 0

## 2020-06-29 NOTE — PROGRESS NOTES
HPI: Tam Rubi presents for follow-up 6 months       Chronic health issues include history melanoma, partial colectomy and cystectomy for diverticulitis and fissure, assymptomatic aortic stenosis, colonic polyps, hyperlipidemia, prostatism, hypertension, A1c 6.4, BMI 35, cholelithiasis,. CT abdomen and chest evaluation diverticulitis revealed calcifications with 8 mm nodule right lower lobe. Has been exposed asbestos. History of tobacco.  Denies any shortness of breath or sputum production. History of bronchitis intermittently. Please have a repeat. In the [employer] had asbestosis and one  from this. No pleural plaques      Mildly elevated liver function tests with negative hepatitis C TIBC. No imaging. History of alcohol and obesity. No diabetes however positive prediabetes. No family history of liver disease. Levels have been stable     Intermittent right intrascapular pain is resolved. Unclear if this occurred with his steroid. Paresthesias improved. Using icy hot. Not interested in further intervention. Positive gallstones.         Prediabetes.  A1c is 6.4.  Significant weight loss however has gained another 5 pounds.  Cut out carbs.  Feels well.  Sleeping well.  Cut back on alcohol.  Pleased with how he is doing.  Walking 30 minutes 5 days weekly currently.      Hx melanoma. Yearly follow-up. .new dermatologist apt 2020 no current concerns     Hypertension. Echocardiogram 2018 with trivial aortic mitral and tricuspid regurgitation. Ejection fraction 60-65%. Mild increased left ventricular wall thickness. Mild aortic stenosis. No syncope, exercise intolerance or edema. No TIA-like symptoms. Checks blood pressure. . Reports stress thallium normal in 2005.   Denies syncope or presyncope is on lisinopril hydrochlorothiazide.  Vytorin.  LDL 67. Sister with valve disease.     History diverticulosis partial colectomy and cystectomy for fistulas.  Occasional left lower quadrant Diagnosis Date    Abnormal breath sounds 12/3/2019    Aortic stenosis, mild 03/27/2018    Echo 3/30/18 - mild  with preserved EF    Calculus of gallbladder without cholecystitis without obstruction 12/27/2019    Colitis     Dermatitis 6/24/2019    Diverticulitis     Diverticulosis     Elevated liver enzymes 11/19/2018    Essential hypertension, benign     Gout     Hyperlipidemia     Malignant melanoma of neck (HCC)     Obesity, Class I, BMI 30.0-34.9 (see actual BMI) 6/24/2019    Prostatism 6/24/2019    Follows with urology q 6 month psa. 5.2019 1.6     Pulmonary nodule 12/27/2019    Needs CT 6.2019     PVC's (premature ventricular contractions)        Past Surgical History:   Procedure Laterality Date    BLADDER REPAIR  7/23/1998    partial cystectomy    COLECTOMY      COLONOSCOPY      HERNIA REPAIR  1969, 1999, 2005    SKIN CANCER EXCISION  9/30/2013    TONSILLECTOMY  1947    TUMOR REMOVAL  1970    right forearm             Family History   Problem Relation Age of Onset    Heart Failure Father 66    Stroke Mother 80    Other Sister 21        mastoids    Heart Failure Brother     Heart Failure Brother 68        older brother   Jearl Counter Sister 68        oldest sister/breast/brain    Cancer Sister 76           Review of Systems      Objective     /68   Pulse 86   Temp 97.9 °F (36.6 °C)   Resp 16   Ht 5' 7\" (1.702 m)   Wt 206 lb (93.4 kg)   SpO2 96% Comment: RA  BMI 32.26 kg/m²     @LASTSAO2(3)@    Wt Readings from Last 3 Encounters:   12/20/19 201 lb 15.1 oz (91.6 kg)   12/20/19 202 lb 6.4 oz (91.8 kg)   12/17/19 198 lb (89.8 kg)       Physical Exam     NAD alert and cooperative  HEENT: TMs unremarkable. Small hypopharynx. Full neck. No bruits. Good upstroke of the carotids. Rales left base. No wheeze or rhonchi. No JVD. Able to lie flat. No tachypnea. Cardiovascular exam mother 6 murmur left upper sternal border without radiation. No ectopy. Obesity.   No

## 2020-07-01 ENCOUNTER — HOSPITAL ENCOUNTER (OUTPATIENT)
Dept: CT IMAGING | Age: 80
Discharge: HOME | End: 2020-07-01
Payer: MEDICARE

## 2020-07-01 PROCEDURE — 71250 CT THORAX DX C-: CPT

## 2020-07-13 ENCOUNTER — OFFICE VISIT (OUTPATIENT)
Dept: PULMONOLOGY | Age: 80
End: 2020-07-13
Payer: MEDICARE

## 2020-07-13 VITALS
WEIGHT: 209 LBS | OXYGEN SATURATION: 96 % | HEIGHT: 67 IN | TEMPERATURE: 98.8 F | HEART RATE: 92 BPM | DIASTOLIC BLOOD PRESSURE: 60 MMHG | BODY MASS INDEX: 32.8 KG/M2 | RESPIRATION RATE: 16 BRPM | SYSTOLIC BLOOD PRESSURE: 118 MMHG

## 2020-07-13 PROCEDURE — 4040F PNEUMOC VAC/ADMIN/RCVD: CPT | Performed by: INTERNAL MEDICINE

## 2020-07-13 PROCEDURE — G8417 CALC BMI ABV UP PARAM F/U: HCPCS | Performed by: INTERNAL MEDICINE

## 2020-07-13 PROCEDURE — 1123F ACP DISCUSS/DSCN MKR DOCD: CPT | Performed by: INTERNAL MEDICINE

## 2020-07-13 PROCEDURE — 99204 OFFICE O/P NEW MOD 45 MIN: CPT | Performed by: INTERNAL MEDICINE

## 2020-07-13 PROCEDURE — 1036F TOBACCO NON-USER: CPT | Performed by: INTERNAL MEDICINE

## 2020-07-13 PROCEDURE — G8427 DOCREV CUR MEDS BY ELIG CLIN: HCPCS | Performed by: INTERNAL MEDICINE

## 2020-07-13 NOTE — PATIENT INSTRUCTIONS
Full breathing studies at hospital    Will need COVID test one week prior to test    I will be in touch about breathing studies    Follow up in 6 months

## 2020-07-13 NOTE — LETTER
[facility]  [address] [address]  Phone: [phone]  Fax: [phone]    Macie Mota DO        July 14, 2020     Tabatha Winn MD  [address] [address]    Patient: Geremias العلي  MR Number: <[id number]>  YOB: 1940  Date of Visit: 7/13/2020    Dear Dr. Tabatha Winn: Thank you for the request for consultation for Bailey Pisano to me for the evaluation of abnormal CT Chest.  Pulmonary nodules. Below are the relevant portions of my assessment and plan of care. Assessment/Plan:  1. Pulmonary nodules  I suspect the largest nodule may not be a nodule as it is adjacent to calcifications. He has significant granulomatous disease with pleural plaques. Of course this needs to be monitored but for 7 months it has not changed. This could all be sequela from asbestos exposure  - Full PFT Study With Bronchodilator; Future    2. 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 Not called on CT report    3. Abnormal CT of the chest  Findings suggest asbestosis. If he has restrictive process with diminished diffusing capacity he could be labeled as such. He lack of symptoms is reassuring.    - Full PFT Study With Bronchodilator; Future      If you have questions, please do not hesitate to call me. I look forward to following Analisa Medley along with you.     Sincerely,        Macie Mota DO

## 2020-07-13 NOTE — PROGRESS NOTES
PATIENT IS SEEN AT THE REQUEST OF DR. Jefferson Carson for lung nodules, abnormal CT Chest     Chief complaint  This is a 70y.o. year old male  who comes to see me with a chief complaint of   Chief Complaint   Patient presents with    Pulmonary Nodule    Abnormal CT       HPI  Here with a cc of lung nodule, abnormal CT chest     He had abdominal pain late last year and went for CT Ab. It showed some scarring and nodules. Follow up CT 7 months later showed stable nodules with scarring and pleural disease. He is here for recommendations. I see his wife so he preferred to see me. He has no lung disease that he is aware off but rather extensive exposure to asbestos. He denies being SOB but has been told he has some \"crackling\" in his lung. Quit tobacco over 40 years ago. Occupation:  [employer], , [employer]     Pets: No    Hobbies/Exposures: Asbestosis     TB Exposure:  No    Lung Procedures:  None      Past Medical History:   Diagnosis Date    Abnormal breath sounds 12/3/2019    Aortic stenosis, mild 03/27/2018    Echo 3/30/18 - mild  with preserved EF    Calculus of gallbladder without cholecystitis without obstruction 12/27/2019    Colitis     Dermatitis 6/24/2019    Diverticulitis     Diverticulosis     Elevated liver enzymes 11/19/2018    Essential hypertension, benign     Gout     Hyperlipidemia     Malignant melanoma of neck (HCC)     Obesity, Class I, BMI 30.0-34.9 (see actual BMI) 6/24/2019    Prostatism 6/24/2019    Follows with urology q 6 month psa.  5.2019 1.6     Pulmonary nodule 12/27/2019    Needs CT 6.2019     PVC's (premature ventricular contractions)        Past Surgical History:   Procedure Laterality Date    BLADDER REPAIR  7/23/1998    partial cystectomy    COLECTOMY      COLONOSCOPY      HERNIA REPAIR  1969, 1999, 2005    SKIN CANCER EXCISION  9/30/2013    TONSILLECTOMY  1947    TUMOR REMOVAL  1970    right forearm       Social History     Socioeconomic History  Marital status:      Spouse name: Ruby Glover Number of children: 10    Years of education: Not on file    Highest education level: Not on file   Occupational History    Occupation: retired   Social Needs    Financial resource strain: Not on file    Food insecurity     Worry: Not on file     Inability: Not on file   [employer] needs     Medical: Not on file     Non-medical: Not on file   Tobacco Use    Smoking status: Former Smoker     Packs/day: 1.00     Years: 18.00     Pack years: 18.00     Types: Cigarettes     Last attempt to quit:      Years since quittin.5    Smokeless tobacco: Never Used   Substance and Sexual Activity    Alcohol use:  Yes     Alcohol/week: 3.0 - 4.0 standard drinks     Types: 3 - 4 Standard drinks or equivalent per week     Comment: every other day    Drug use: No    Sexual activity: Yes     Partners: Female   Lifestyle    Physical activity     Days per week: Not on file     Minutes per session: Not on file    Stress: Not on file   Relationships    Social connections     Talks on phone: Not on file     Gets together: Not on file     Attends [employer]ism service: Not on file     Active member of club or organization: Not on file     Attends meetings of clubs or organizations: Not on file     Relationship status: Not on file    Intimate partner violence     Fear of current or ex partner: Not on file     Emotionally abused: Not on file     Physically abused: Not on file     Forced sexual activity: Not on file   Other Topics Concern    Not on file   Social History Narrative    Not on file       Family History   Problem Relation Age of Onset    Heart Failure Father 66    Stroke Mother 80    Other Sister 21        mastoids    Heart Failure Brother     Heart Failure Brother 68        older brother   Migdalia Low Sister 68        oldest sister/breast/brain    Cancer Sister 76         Current Outpatient Medications:     ezetimibe-simvastatin (VYTORIN) 10-40 MG per tablet, TAKE ONE TABLET BY MOUTH DAILY, Disp: 90 tablet, Rfl: 3    hydrocortisone (ANUSOL-HC) 2.5 % CREA rectal cream, APPLY TO AFFECTED AREA TWICE DAILY, Disp: 28 g, Rfl: 1    lisinopril-hydroCHLOROthiazide (PRINZIDE;ZESTORETIC) 20-12.5 MG per tablet, TAKE TWO TABLETS BY MOUTH DAILY, Disp: 180 tablet, Rfl: 0    hydrocortisone (LOCOID) 0.1 % OINT oitment, APPLY UP TO TWO TIMES A DAY AS NEEDED RASH, Disp: 15 g, Rfl: 0    betamethasone dipropionate 0.05 % lotion, Apply topically 2 times daily. , Disp: 60 mL, Rfl: 1    tamsulosin (FLOMAX) 0.4 MG capsule, TAKE ONE CAPSULE BY MOUTH DAILY, Disp: 90 capsule, Rfl: 1    allopurinol (ZYLOPRIM) 300 MG tablet, TAKE ONE TABLET BY MOUTH DAILY, Disp: 90 tablet, Rfl: 4    Omega-3 Fatty Acids (FISH OIL) 1000 MG CAPS, Take 1,000 mg by mouth 2 times daily, Disp: , Rfl:       ROS:  GENERAL:  No fevers, chills, or night sweats  HEENT:  No double vision, blurry vision, or difficulty swallowing  HEART:  No chest pain, no palpitations  LUNGS: Rare cough  ABDOMEN:  On and off Ab pain   GENITOURINARY:  No increased frequency, no blood in urine  EXTREMITIES:  No swelling, no lesions  NEURO:  No numbness or tingling, no seizures  SKIN:  No new rashes, no changes in hair or nails  LYMPH:  No masses, no swelling neck, armpits, or groin    PHYSICAL EXAM:  Vitals:    07/13/20 1438   BP: 118/60   Pulse: 92   Resp: 16   Temp: 98.8 °F (37.1 °C)   SpO2: 96%       GENERAL:  Well nourished, alert, appears stated age, no distress  HEENT:  No scleral icterus, no conjunctival irritation  NECK:  No thyromegaly, no bruits  LYMPH:  No cervical or supraclavicular adenopathy  HEART:  Regular rate and rhythm, no murmurs  LUNGS:  Faint crackles in the bases, diminished which could be due to body habitus   ABDOMEN:  No distention, no organomegaly  EXTREMITIES:  No edema, no digital clubbing  NEURO:  No localizing deficits, CN II-XII intact    Pulmonary Function Testing  None    Chest imaging from 7/2020 will be sent to referring physician regarding findings and plan.

## 2020-07-14 ENCOUNTER — INITIAL CONSULT (OUTPATIENT)
Dept: SURGERY | Age: 80
End: 2020-07-14
Payer: MEDICARE

## 2020-07-14 VITALS — BODY MASS INDEX: 32.8 KG/M2 | HEIGHT: 67 IN | WEIGHT: 209 LBS

## 2020-07-14 PROCEDURE — 4040F PNEUMOC VAC/ADMIN/RCVD: CPT | Performed by: SURGERY

## 2020-07-14 PROCEDURE — 1123F ACP DISCUSS/DSCN MKR DOCD: CPT | Performed by: SURGERY

## 2020-07-14 PROCEDURE — G8427 DOCREV CUR MEDS BY ELIG CLIN: HCPCS | Performed by: SURGERY

## 2020-07-14 PROCEDURE — G8417 CALC BMI ABV UP PARAM F/U: HCPCS | Performed by: SURGERY

## 2020-07-14 PROCEDURE — 99203 OFFICE O/P NEW LOW 30 MIN: CPT | Performed by: SURGERY

## 2020-07-14 PROCEDURE — 1036F TOBACCO NON-USER: CPT | Performed by: SURGERY

## 2020-07-14 RX ORDER — TAMSULOSIN HYDROCHLORIDE 0.4 MG/1
CAPSULE ORAL
Qty: 90 CAPSULE | Refills: 1 | Status: SHIPPED | OUTPATIENT
Start: 2020-07-14 | End: 2021-01-12

## 2020-07-14 NOTE — PROGRESS NOTES
Subjective:      Patient ID: Ariadne Lorenz is a 70 y.o. male. HPI  Chief Complaint: gallstones    Patient referred by Dr. Marshel Phoenix for evaluation of cholelithiasis. Patient reports symptoms of LLQ and was diagnosed with diverticulitis on CT. That scan revealed incidental cholelithiasis. Diverticulitis resolved with antibiotics. No history of RUQ pain or issues with fatty foods. Had one episode of pain in upper back which may have been related but likely was musculoskeletal. No history of jaundice or pancreatitis. Will observe for now. Follow up with me as needed        Past Medical History:   Diagnosis Date    Abnormal breath sounds 12/3/2019    Aortic stenosis, mild 03/27/2018    Echo 3/30/18 - mild  with preserved EF    Calculus of gallbladder without cholecystitis without obstruction 12/27/2019    Colitis     Dermatitis 6/24/2019    Diverticulitis     Diverticulosis     Elevated liver enzymes 11/19/2018    Essential hypertension, benign     Gout     Hyperlipidemia     Malignant melanoma of neck (HCC)     Obesity, Class I, BMI 30.0-34.9 (see actual BMI) 6/24/2019    Prostatism 6/24/2019    Follows with urology q 6 month psa.  5.2019 1.6     Pulmonary nodule 12/27/2019    Needs CT 6.2019     PVC's (premature ventricular contractions)        Past Surgical History:   Procedure Laterality Date    BLADDER REPAIR  7/23/1998    partial cystectomy    COLECTOMY      COLONOSCOPY      HERNIA REPAIR  1969, 1999, 2005    SKIN CANCER EXCISION  9/30/2013    TONSILLECTOMY  1947    TUMOR REMOVAL  1970    right forearm       Current Outpatient Medications   Medication Sig Dispense Refill    ezetimibe-simvastatin (VYTORIN) 10-40 MG per tablet TAKE ONE TABLET BY MOUTH DAILY 90 tablet 3    hydrocortisone (ANUSOL-HC) 2.5 % CREA rectal cream APPLY TO AFFECTED AREA TWICE DAILY 28 g 1    lisinopril-hydroCHLOROthiazide (PRINZIDE;ZESTORETIC) 20-12.5 MG per tablet TAKE TWO TABLETS BY MOUTH DAILY 180 tablet 0    hydrocortisone (LOCOID) 0.1 % OINT oitment APPLY UP TO TWO TIMES A DAY AS NEEDED RASH 15 g 0    betamethasone dipropionate 0.05 % lotion Apply topically 2 times daily. 60 mL 1    tamsulosin (FLOMAX) 0.4 MG capsule TAKE ONE CAPSULE BY MOUTH DAILY 90 capsule 1    allopurinol (ZYLOPRIM) 300 MG tablet TAKE ONE TABLET BY MOUTH DAILY 90 tablet 4    Omega-3 Fatty Acids (FISH OIL) 1000 MG CAPS Take 1,000 mg by mouth 2 times daily       No current facility-administered medications for this visit. Prior to Admission medications    Medication Sig Start Date End Date Taking? Authorizing Provider   ezetimibe-simvastatin (VYTORIN) 10-40 MG per tablet TAKE ONE TABLET BY MOUTH DAILY 6/8/20  Yes Anjelica Chatterjee MD   hydrocortisone (ANUSOL-HC) 2.5 % CREA rectal cream APPLY TO AFFECTED AREA TWICE DAILY 6/3/20  Yes Inez Estrada MD   lisinopril-hydroCHLOROthiazide (PRINZIDE;ZESTORETIC) 20-12.5 MG per tablet TAKE TWO TABLETS BY MOUTH DAILY 5/4/20  Yes Inez Estrada MD   hydrocortisone (LOCOID) 0.1 % OINT oitment APPLY UP TO TWO TIMES A DAY AS NEEDED RASH 4/20/20  Yes Inez Estrada MD   betamethasone dipropionate 0.05 % lotion Apply topically 2 times daily. 3/26/20  Yes Inez Estrada MD   tamsulosin (FLOMAX) 0.4 MG capsule TAKE ONE CAPSULE BY MOUTH DAILY 1/13/20  Yes Inez Estrada MD   allopurinol (ZYLOPRIM) 300 MG tablet TAKE ONE TABLET BY MOUTH DAILY 7/18/19  Yes Inez Estrada MD   Omega-3 Fatty Acids (FISH OIL) 1000 MG CAPS Take 1,000 mg by mouth 2 times daily   Yes Historical Provider, MD         Allergies   Allergen Reactions    Guaifenesin & Derivatives      Nervous.  Metamucil [Psyllium]      Chest pain.  Norflex Tablets [Orphenadrine]      Urination issues.        Social History     Socioeconomic History    Marital status:      Spouse name: Arlet Rios Number of children: 10    Years of education: Not on file    Highest education level: Not on file   Occupational History    Occupation: retired   Social Needs    Financial resource strain: Not on file    Food insecurity     Worry: Not on file     Inability: Not on file   [employer] needs     Medical: Not on file     Non-medical: Not on file   Tobacco Use    Smoking status: Former Smoker     Packs/day: 1.00     Years: 18.00     Pack years: 18.00     Types: Cigarettes     Last attempt to quit:      Years since quittin.5    Smokeless tobacco: Never Used   Substance and Sexual Activity    Alcohol use: Yes     Alcohol/week: 3.0 - 4.0 standard drinks     Types: 3 - 4 Standard drinks or equivalent per week     Comment: every other day    Drug use: No    Sexual activity: Yes     Partners: Female   Lifestyle    Physical activity     Days per week: Not on file     Minutes per session: Not on file    Stress: Not on file   Relationships    Social connections     Talks on phone: Not on file     Gets together: Not on file     Attends Oriental orthodox service: Not on file     Active member of club or organization: Not on file     Attends meetings of clubs or organizations: Not on file     Relationship status: Not on file    Intimate partner violence     Fear of current or ex partner: Not on file     Emotionally abused: Not on file     Physically abused: Not on file     Forced sexual activity: Not on file   Other Topics Concern    Not on file   Social History Narrative    Not on file       Family History   Problem Relation Age of Onset    Heart Failure Father 66    Stroke Mother 80    Other Sister 21        mastoids    Heart Failure Brother     Heart Failure Brother 68        older brother   Giancarlo Negus Sister 68        oldest sister/breast/brain    Cancer Sister [phone]       Review of Systems   Gastrointestinal: Positive for abdominal pain (LLQ, resolved). All other systems reviewed and are negative. Objective:   Physical Exam  Constitutional:       Appearance: He is well-developed.    HENT:      Head: Normocephalic and atraumatic. Neck:      Musculoskeletal: Neck supple. Thyroid: No thyromegaly. Trachea: No tracheal deviation. Cardiovascular:      Heart sounds: Normal heart sounds. No murmur. Pulmonary:      Effort: Pulmonary effort is normal. No respiratory distress. Breath sounds: Normal breath sounds. Abdominal:      General: There is no distension. Palpations: Abdomen is soft. Tenderness: There is no abdominal tenderness. There is no guarding. Musculoskeletal:         General: No tenderness. Skin:     General: Skin is warm and dry. Neurological:      Mental Status: He is alert and oriented to person, place, and time. Cranial Nerves: No cranial nerve deficit. Psychiatric:         Behavior: Behavior normal.       Vitals    Last recorded: 07/14 0954   Height: 5' 7\" (1.702 m)       Weight: 209 lb (94.8 kg)       Assessment:       Diagnosis Orders   1.  Calculus of gallbladder without cholecystitis without obstruction             Plan:      Follow up with me as needed          Filomena Townsend MD

## 2020-07-14 NOTE — LETTER
[address] [facility],[address]  [address] [address]  Phone: [phone]  Fax: [phone]    Edith Cavanaugh MD        July 15, 2020     India Butler MD  [facility]    Patient: Benton Chiu  MR Number: <[id number]>  YOB: 1940  Date of Visit: 7/14/2020    Dear Dr. India Butler: Thank you for the request for consultation for Lynn Fitzgeraldrra to me for the evaluation of his cholelithiasis. Below are the relevant portions of my assessment and plan of care. Tigist Diallo is asymptomatic at this time so we have decided to just observe for now. If he develops any gallbladder issues in the future we will move ahead with cholecystectomy. If you have questions, please do not hesitate to call me. I look forward to following Tigist Diallo along with you.     Sincerely,          Edith Cavanaugh MD

## 2020-07-15 ENCOUNTER — NURSE ONLY (OUTPATIENT)
Dept: FAMILY MEDICINE CLINIC | Age: 80
End: 2020-07-15
Payer: MEDICARE

## 2020-07-15 LAB
ANION GAP SERPL CALCULATED.3IONS-SCNC: 15 MMOL/L (ref 3–16)
BUN BLDV-MCNC: 35 MG/DL (ref 7–20)
CALCIUM SERPL-MCNC: 9.4 MG/DL (ref 8.3–10.6)
CHLORIDE BLD-SCNC: 103 MMOL/L (ref 99–110)
CO2: 23 MMOL/L (ref 21–32)
CREAT SERPL-MCNC: 1.3 MG/DL (ref 0.8–1.3)
GFR AFRICAN AMERICAN: >60
GFR NON-AFRICAN AMERICAN: 53
GLUCOSE BLD-MCNC: 99 MG/DL (ref 70–99)
POTASSIUM SERPL-SCNC: 4.6 MMOL/L (ref 3.5–5.1)
SODIUM BLD-SCNC: 141 MMOL/L (ref 136–145)

## 2020-07-15 PROCEDURE — 36415 COLL VENOUS BLD VENIPUNCTURE: CPT | Performed by: INTERNAL MEDICINE

## 2020-07-15 ASSESSMENT — ENCOUNTER SYMPTOMS: ABDOMINAL PAIN: 1

## 2020-08-05 ENCOUNTER — OFFICE VISIT (OUTPATIENT)
Dept: PRIMARY CARE CLINIC | Age: 80
End: 2020-08-05
Payer: MEDICARE

## 2020-08-05 PROCEDURE — G8417 CALC BMI ABV UP PARAM F/U: HCPCS | Performed by: NURSE PRACTITIONER

## 2020-08-05 PROCEDURE — 99211 OFF/OP EST MAY X REQ PHY/QHP: CPT | Performed by: NURSE PRACTITIONER

## 2020-08-05 PROCEDURE — G8428 CUR MEDS NOT DOCUMENT: HCPCS | Performed by: NURSE PRACTITIONER

## 2020-08-05 NOTE — PROGRESS NOTES
Patient presented to [facility] drive up clinic for preop testing. Patient was swabbed and given information advising them to remain isolated until procedure date.

## 2020-08-07 LAB — SARS-COV-2, NAA: NOT DETECTED

## 2020-08-11 ENCOUNTER — HOSPITAL ENCOUNTER (OUTPATIENT)
Dept: PULMONOLOGY | Age: 80
Discharge: HOME | End: 2020-08-11
Payer: MEDICARE

## 2020-08-11 LAB
DLCO %PRED: 65 %
DLCO PRED: NORMAL
DLCO/VA %PRED: NORMAL
DLCO/VA PRED: NORMAL
DLCO/VA: NORMAL
DLCO: NORMAL
EXPIRATORY TIME-POST: NORMAL
EXPIRATORY TIME: NORMAL
FEF 25-75% %CHNG: NORMAL
FEF 25-75% %PRED-POST: NORMAL
FEF 25-75% %PRED-PRE: NORMAL
FEF 25-75% PRED: NORMAL
FEF 25-75%-POST: NORMAL
FEF 25-75%-PRE: NORMAL
FEV1 %PRED-POST: 107 %
FEV1 %PRED-PRE: 106 %
FEV1 PRED: NORMAL
FEV1-POST: NORMAL
FEV1-PRE: NORMAL
FEV1/FVC %PRED-POST: NORMAL
FEV1/FVC %PRED-PRE: NORMAL
FEV1/FVC PRED: NORMAL
FEV1/FVC-POST: 89 %
FEV1/FVC-PRE: 87 %
FVC %PRED-POST: NORMAL
FVC %PRED-PRE: NORMAL
FVC PRED: NORMAL
FVC-POST: NORMAL
FVC-PRE: NORMAL
GAW %PRED: NORMAL
GAW PRED: NORMAL
GAW: NORMAL
IC %PRED: NORMAL
IC PRED: NORMAL
IC: NORMAL
MEP: NORMAL
MIP: NORMAL
MVV %PRED-PRE: NORMAL
MVV PRED: NORMAL
MVV-PRE: NORMAL
PEF %PRED-POST: NORMAL
PEF %PRED-PRE: NORMAL
PEF PRED: NORMAL
PEF%CHNG: NORMAL
PEF-POST: NORMAL
PEF-PRE: NORMAL
RAW %PRED: NORMAL
RAW PRED: NORMAL
RAW: NORMAL
RV %PRED: NORMAL
RV PRED: NORMAL
RV: NORMAL
SVC %PRED: NORMAL
SVC PRED: NORMAL
SVC: NORMAL
TLC %PRED: 81 %
TLC PRED: NORMAL
TLC: NORMAL
VA %PRED: NORMAL
VA PRED: NORMAL
VA: NORMAL
VTG %PRED: NORMAL
VTG PRED: NORMAL
VTG: NORMAL

## 2020-08-11 PROCEDURE — 6370000000 HC RX 637 (ALT 250 FOR IP): Performed by: INTERNAL MEDICINE

## 2020-08-11 PROCEDURE — 94726 PLETHYSMOGRAPHY LUNG VOLUMES: CPT

## 2020-08-11 PROCEDURE — 94726 PLETHYSMOGRAPHY LUNG VOLUMES: CPT | Performed by: INTERNAL MEDICINE

## 2020-08-11 PROCEDURE — 94729 DIFFUSING CAPACITY: CPT

## 2020-08-11 PROCEDURE — 94060 EVALUATION OF WHEEZING: CPT

## 2020-08-11 PROCEDURE — 94664 DEMO&/EVAL PT USE INHALER: CPT

## 2020-08-11 PROCEDURE — 94060 EVALUATION OF WHEEZING: CPT | Performed by: INTERNAL MEDICINE

## 2020-08-11 PROCEDURE — 94729 DIFFUSING CAPACITY: CPT | Performed by: INTERNAL MEDICINE

## 2020-08-11 PROCEDURE — 94640 AIRWAY INHALATION TREATMENT: CPT

## 2020-08-11 RX ORDER — ALBUTEROL SULFATE 90 UG/1
4 AEROSOL, METERED RESPIRATORY (INHALATION) ONCE
Status: COMPLETED | OUTPATIENT
Start: 2020-08-11 | End: 2020-08-11

## 2020-08-11 RX ADMIN — Medication 4 PUFF: at 07:53

## 2020-08-11 ASSESSMENT — PULMONARY FUNCTION TESTS
FEV1_PERCENT_PREDICTED_POST: 107
FEV1/FVC_PRE: 87
FEV1/FVC_POST: 89
FEV1_PERCENT_PREDICTED_PRE: 106

## 2020-08-13 NOTE — PROCEDURES
Spirometry was acceptable and reproducible by ATS standards      Spirometry/Flow volume loop:  Spirometry and flow volume loops do not show airflow obstruction. FEV1 107 %. FVC 90 %There is no bronchodilator response. Lung volumes:  Lung volumes do not demonstrate restriction or hyperinflation. Diffusing capacity:  DLCO is  reduced, (not corrected for hemoglobin)          Summary:  No evidence of airflow obstruction, lung volumes are within normal limits. There is an isolated reduction diffusing capacity which can be seen in:  Anemia, early interstitial lung disease, COPD and pulmonary vascular disorders such as pulmonary hypertension. Clinical correlation is advised. FEV1 %Pred-Post   Date Value Ref Range Status   08/12/2020 107 % Final     FEV1/FVC-Post   Date Value Ref Range Status   08/12/2020 89 % Final     TLC %Pred   Date Value Ref Range Status   08/12/2020 81 % Final           OBSTRUCTION % Predicted FEV1   MILD >70%   MODERATE 60-69%   MODERATELY-SEVERE 50-59%   SEVERE 35-49%   VERY SEVERE <35%         RESTRICTION % Predicted TLC   MILD 66-80%   MODERATE 54-65%   MODERATELY-SEVERE <54%                 DIFFUSION CAPACITY DLCO % Pred   MILD >60% AND < LLN   MODERATE 40-60%   SEVERE <40%       PFT data will be scanned into the media tab under this encounter. Please see the scanned data for numerical values.      Laura Bajwa MD  [facility] Pulmonary, Sleep and Critical Care Medicine

## 2020-09-14 RX ORDER — HYDROCORTISONE 25 MG/G
CREAM TOPICAL
Qty: 30 G | Refills: 0 | Status: SHIPPED | OUTPATIENT
Start: 2020-09-14 | End: 2020-10-27

## 2020-10-27 RX ORDER — BETAMETHASONE DIPROPIONATE 0.5 MG/G
LOTION TOPICAL
Qty: 60 ML | Refills: 2 | Status: SHIPPED | OUTPATIENT
Start: 2020-10-27 | End: 2022-09-27

## 2020-12-14 ENCOUNTER — OFFICE VISIT (OUTPATIENT)
Dept: PULMONOLOGY | Age: 80
End: 2020-12-14
Payer: MEDICARE

## 2020-12-14 VITALS
HEIGHT: 67 IN | WEIGHT: 211.2 LBS | BODY MASS INDEX: 33.15 KG/M2 | HEART RATE: 63 BPM | TEMPERATURE: 97.8 F | SYSTOLIC BLOOD PRESSURE: 142 MMHG | RESPIRATION RATE: 16 BRPM | DIASTOLIC BLOOD PRESSURE: 74 MMHG | OXYGEN SATURATION: 93 %

## 2020-12-14 PROCEDURE — G8482 FLU IMMUNIZE ORDER/ADMIN: HCPCS | Performed by: INTERNAL MEDICINE

## 2020-12-14 PROCEDURE — G8427 DOCREV CUR MEDS BY ELIG CLIN: HCPCS | Performed by: INTERNAL MEDICINE

## 2020-12-14 PROCEDURE — 4040F PNEUMOC VAC/ADMIN/RCVD: CPT | Performed by: INTERNAL MEDICINE

## 2020-12-14 PROCEDURE — 99213 OFFICE O/P EST LOW 20 MIN: CPT | Performed by: INTERNAL MEDICINE

## 2020-12-14 PROCEDURE — 1123F ACP DISCUSS/DSCN MKR DOCD: CPT | Performed by: INTERNAL MEDICINE

## 2020-12-14 PROCEDURE — 1036F TOBACCO NON-USER: CPT | Performed by: INTERNAL MEDICINE

## 2020-12-14 PROCEDURE — G8417 CALC BMI ABV UP PARAM F/U: HCPCS | Performed by: INTERNAL MEDICINE

## 2020-12-14 NOTE — PROGRESS NOTES
Chief complaint  This is a 68y.o. year old male  who comes to see me with a chief complaint of   Chief Complaint   Patient presents with    Pulmonary Nodule     follow up        HPI  Here with a cc of lung nodule, abnormal CT chest     Doing well. Did PFTS since last visit. They showed isolated DLCO reduction. He is doing fine otherwise. No real changes and no symptoms of SOB etc.        Past Medical History:   Diagnosis Date    Abnormal breath sounds 12/3/2019    Aortic stenosis, mild 03/27/2018    Echo 3/30/18 - mild  with preserved EF    Calculus of gallbladder without cholecystitis without obstruction 12/27/2019    Colitis     Dermatitis 6/24/2019    Diverticulitis     Diverticulosis     Elevated liver enzymes 11/19/2018    Essential hypertension, benign     Gout     Hyperlipidemia     Malignant melanoma of neck (HCC)     Obesity, Class I, BMI 30.0-34.9 (see actual BMI) 6/24/2019    Prostatism 6/24/2019    Follows with urology q 6 month psa.  5.2019 1.6     Pulmonary nodule 12/27/2019    Needs CT 6.2019     PVC's (premature ventricular contractions)        Past Surgical History:   Procedure Laterality Date    BLADDER REPAIR  7/23/1998    partial cystectomy    COLECTOMY      COLONOSCOPY      HERNIA REPAIR  1969, 1999, 2005    SKIN CANCER EXCISION  9/30/2013    TONSILLECTOMY  1947    TUMOR REMOVAL  1970    right forearm       Social History     Socioeconomic History    Marital status:      Spouse name: Jean-Paul Spivey Number of children: 10    Years of education: Not on file    Highest education level: Not on file   Occupational History    Occupation: retired   Social Needs    Financial resource strain: Not on file    Food insecurity     Worry: Not on file     Inability: Not on file   [employer] needs     Medical: Not on file     Non-medical: Not on file   Tobacco Use    Smoking status: Former Smoker     Packs/day: 1.00     Years: 18.00     Pack years: 18.00 Types: Cigarettes     Quit date: 0     Years since quittin.5    Smokeless tobacco: Never Used   Substance and Sexual Activity    Alcohol use:  Yes     Alcohol/week: 3.0 - 4.0 standard drinks     Types: 3 - 4 Standard drinks or equivalent per week     Comment: every other day    Drug use: No    Sexual activity: Yes     Partners: Female   Lifestyle    Physical activity     Days per week: Not on file     Minutes per session: Not on file    Stress: Not on file   Relationships    Social connections     Talks on phone: Not on file     Gets together: Not on file     Attends Yazdanism service: Not on file     Active member of club or organization: Not on file     Attends meetings of clubs or organizations: Not on file     Relationship status: Not on file    Intimate partner violence     Fear of current or ex partner: Not on file     Emotionally abused: Not on file     Physically abused: Not on file     Forced sexual activity: Not on file   Other Topics Concern    Not on file   Social History Narrative    Not on file       Family History   Problem Relation Age of Onset    Heart Failure Father 66    Stroke Mother 80    Other Sister 21        mastoids    Heart Failure Brother     Heart Failure Brother 68        older brother   Dorisann Bias Sister 68        oldest sister/breast/brain    Cancer Sister 76         Current Outpatient Medications:     hydrocortisone (ANUSOL-HC) 2.5 % CREA rectal cream, APPLY TO AFFECTED AREA(S) TWO TIMES A DAY, Disp: 28 g, Rfl: 3    hydrocortisone (LOCOID) 0.1 % OINT oitment, APPLY UP TO 2 TIMES DAILY AS NEEDED FOR RASH, Disp: 45 g, Rfl: 2    betamethasone dipropionate 0.05 % lotion, APPLY TOPICALLY TWICE DAILY, Disp: 60 mL, Rfl: 2    allopurinol (ZYLOPRIM) 300 MG tablet, TAKE ONE TABLET BY MOUTH DAILY, Disp: 90 tablet, Rfl: 2    lisinopril-hydroCHLOROthiazide (PRINZIDE;ZESTORETIC) 20-12.5 MG per tablet, TAKE TWO TABLETS BY MOUTH DAILY, Disp: 180 tablet, Rfl: 1    tamsulosin (FLOMAX) 0.4 MG capsule, TAKE ONE CAPSULE BY MOUTH DAILY, Disp: 90 capsule, Rfl: 1    ezetimibe-simvastatin (VYTORIN) 10-40 MG per tablet, TAKE ONE TABLET BY MOUTH DAILY, Disp: 90 tablet, Rfl: 3    Omega-3 Fatty Acids (FISH OIL) 1000 MG CAPS, Take 1,000 mg by mouth 2 times daily, Disp: , Rfl:       PHYSICAL EXAM:  Vitals:    12/14/20 0831   BP: (!) 142/74   Pulse: 63   Resp: 16   Temp: 97.8 °F (36.6 °C)   SpO2: 93%       GENERAL:  Well nourished, alert, appears stated age, no distress  HEENT:  No scleral icterus, no conjunctival irritation  NECK:  No thyromegaly, no bruits  LYMPH:  No cervical or supraclavicular adenopathy  HEART:  Regular rate and rhythm, no murmurs  LUNGS:  Faint crackles   ABDOMEN:  No distention, no organomegaly  EXTREMITIES:  No edema, no digital clubbing  NEURO:  No localizing deficits, CN II-XII intact    Pulmonary Function Testing 8/2020  Mild reduction in DLCO    Chest imaging from 7/2020 is reviewed. My interpretation is pleural plaques, calcifications, scarring, reticular markings, small LLL nodule vs scarring      ECHO  Left ventricular cavity size is normal with mild concentric left ventricular   hypertrophy. Ejection fraction is visually estimated to be 60%. Grade II diastolic dysfunction with elevated LV filling pressures. Calcification of the mitral valve noted. Trivial mitral regurgitation is present. No evidence of mitral stenosis. The left atrium is mild to moderate dilated. Mild aortic stenosis with a peak velocity of 330m/s and a mean pressure   gradient of 15mmHg. Trivial aortic regurgitation. The ascending aorta is mildly dilated & measures at 4.2 cms The aortic root is normal in size.   Lab Results   Component Value Date    WBC 8.7 06/29/2020    HGB 14.8 06/29/2020    HCT 45.0 06/29/2020    MCV 96.1 06/29/2020     06/29/2020       No results found for: BNP    Lab Results   Component Value Date    CREATININE 1.3 07/15/2020    BUN 35 (H) 07/15/2020     07/15/2020    K 4.6 07/15/2020     07/15/2020    CO2 23 07/15/2020       I reviewed above labs and studies pertinent to this visit and date    Assessment/Plan:  1. Pulmonary nodules  Stable on imaging and likely scars/granulomas. Will assess for change on next imaging cycle (7/2021)    2. 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 Abnormal CT of the chest  Findings showed some mild scarring in the lungs which could be from asbestos exposures. Also with pleural plaques. CT in 7/2021.   Possible PFTs in 12 months too depending on symptoms etc.      Follow up in 6 months

## 2021-01-12 DIAGNOSIS — R35.1 NOCTURIA: ICD-10-CM

## 2021-01-12 RX ORDER — TAMSULOSIN HYDROCHLORIDE 0.4 MG/1
CAPSULE ORAL
Qty: 90 CAPSULE | Refills: 0 | Status: SHIPPED | OUTPATIENT
Start: 2021-01-12 | End: 2021-04-14

## 2021-01-26 DIAGNOSIS — I10 ESSENTIAL HYPERTENSION, BENIGN: ICD-10-CM

## 2021-01-26 RX ORDER — LISINOPRIL AND HYDROCHLOROTHIAZIDE 20; 12.5 MG/1; MG/1
TABLET ORAL
Qty: 180 TABLET | Refills: 0 | Status: SHIPPED | OUTPATIENT
Start: 2021-01-26 | End: 2021-04-27

## 2021-03-18 ENCOUNTER — TELEPHONE (OUTPATIENT)
Dept: FAMILY MEDICINE CLINIC | Age: 81
End: 2021-03-18

## 2021-03-18 NOTE — TELEPHONE ENCOUNTER
----- Message from Mariela Campa sent at 3/18/2021  9:15 AM EDT -----  Subject: Message to Provider    QUESTIONS  Information for Provider? pt would like james or someone to let him know   if he needs to fast for appt for next week    please call to advise   ---------------------------------------------------------------------------  --------------  CALL BACK INFO  What is the best way for the office to contact you? OK to leave message on   voicemail  Preferred Call Back Phone Number? [phone]  ---------------------------------------------------------------------------  --------------  SCRIPT ANSWERS  Relationship to Patient?  Self

## 2021-03-22 ENCOUNTER — OFFICE VISIT (OUTPATIENT)
Dept: FAMILY MEDICINE CLINIC | Age: 81
End: 2021-03-22
Payer: MEDICARE

## 2021-03-22 VITALS
BODY MASS INDEX: 30.92 KG/M2 | SYSTOLIC BLOOD PRESSURE: 120 MMHG | DIASTOLIC BLOOD PRESSURE: 68 MMHG | WEIGHT: 197 LBS | RESPIRATION RATE: 12 BRPM | HEART RATE: 85 BPM | OXYGEN SATURATION: 98 % | HEIGHT: 67 IN | TEMPERATURE: 97.4 F

## 2021-03-22 DIAGNOSIS — R91.1 PULMONARY NODULE: ICD-10-CM

## 2021-03-22 DIAGNOSIS — Z87.898 HISTORY OF PREDIABETES: ICD-10-CM

## 2021-03-22 DIAGNOSIS — R25.2 MUSCLE CRAMPS: ICD-10-CM

## 2021-03-22 DIAGNOSIS — E78.00 PURE HYPERCHOLESTEROLEMIA: ICD-10-CM

## 2021-03-22 DIAGNOSIS — K80.20 CALCULUS OF GALLBLADDER WITHOUT CHOLECYSTITIS WITHOUT OBSTRUCTION: ICD-10-CM

## 2021-03-22 DIAGNOSIS — E66.9 OBESITY, CLASS I, BMI 30.0-34.9 (SEE ACTUAL BMI): ICD-10-CM

## 2021-03-22 DIAGNOSIS — R73.03 PREDIABETES: ICD-10-CM

## 2021-03-22 DIAGNOSIS — I10 ESSENTIAL HYPERTENSION, BENIGN: Primary | ICD-10-CM

## 2021-03-22 DIAGNOSIS — C43.4 MALIGNANT MELANOMA OF NECK (HCC): ICD-10-CM

## 2021-03-22 DIAGNOSIS — N40.0 PROSTATISM: ICD-10-CM

## 2021-03-22 DIAGNOSIS — R35.1 NOCTURIA: ICD-10-CM

## 2021-03-22 DIAGNOSIS — M10.9 GOUT OF FOOT, UNSPECIFIED CAUSE, UNSPECIFIED CHRONICITY, UNSPECIFIED LATERALITY: ICD-10-CM

## 2021-03-22 LAB
A/G RATIO: 1.7 (ref 1.1–2.2)
ALBUMIN SERPL-MCNC: 4.5 G/DL (ref 3.4–5)
ALP BLD-CCNC: 97 U/L (ref 40–129)
ALT SERPL-CCNC: 25 U/L (ref 10–40)
ANION GAP SERPL CALCULATED.3IONS-SCNC: 17 MMOL/L (ref 3–16)
AST SERPL-CCNC: 27 U/L (ref 15–37)
BILIRUB SERPL-MCNC: 0.7 MG/DL (ref 0–1)
BUN BLDV-MCNC: 26 MG/DL (ref 7–20)
CALCIUM SERPL-MCNC: 9.5 MG/DL (ref 8.3–10.6)
CHLORIDE BLD-SCNC: 101 MMOL/L (ref 99–110)
CO2: 23 MMOL/L (ref 21–32)
CREAT SERPL-MCNC: 1.3 MG/DL (ref 0.8–1.3)
GFR AFRICAN AMERICAN: >60
GFR NON-AFRICAN AMERICAN: 53
GLOBULIN: 2.6 G/DL
GLUCOSE BLD-MCNC: 89 MG/DL (ref 70–99)
POTASSIUM SERPL-SCNC: 4.2 MMOL/L (ref 3.5–5.1)
SODIUM BLD-SCNC: 141 MMOL/L (ref 136–145)
TOTAL CK: 287 U/L (ref 39–308)
TOTAL PROTEIN: 7.1 G/DL (ref 6.4–8.2)

## 2021-03-22 PROCEDURE — 4040F PNEUMOC VAC/ADMIN/RCVD: CPT | Performed by: INTERNAL MEDICINE

## 2021-03-22 PROCEDURE — G8482 FLU IMMUNIZE ORDER/ADMIN: HCPCS | Performed by: INTERNAL MEDICINE

## 2021-03-22 PROCEDURE — 36415 COLL VENOUS BLD VENIPUNCTURE: CPT | Performed by: INTERNAL MEDICINE

## 2021-03-22 PROCEDURE — G8417 CALC BMI ABV UP PARAM F/U: HCPCS | Performed by: INTERNAL MEDICINE

## 2021-03-22 PROCEDURE — G8427 DOCREV CUR MEDS BY ELIG CLIN: HCPCS | Performed by: INTERNAL MEDICINE

## 2021-03-22 PROCEDURE — 99214 OFFICE O/P EST MOD 30 MIN: CPT | Performed by: INTERNAL MEDICINE

## 2021-03-22 PROCEDURE — 1036F TOBACCO NON-USER: CPT | Performed by: INTERNAL MEDICINE

## 2021-03-22 PROCEDURE — 1123F ACP DISCUSS/DSCN MKR DOCD: CPT | Performed by: INTERNAL MEDICINE

## 2021-03-22 ASSESSMENT — PATIENT HEALTH QUESTIONNAIRE - PHQ9
SUM OF ALL RESPONSES TO PHQ QUESTIONS 1-9: 2
2. FEELING DOWN, DEPRESSED OR HOPELESS: 1
1. LITTLE INTEREST OR PLEASURE IN DOING THINGS: 1

## 2021-03-22 NOTE — PROGRESS NOTES
HPI: Gaudencio Rodriguez presents for valuation and management of leg cramps follow-up. Chronic health issues include history neck  melanoma, partial colectomy and cystectomy for diverticulitis and fissure, assymptomatic aortic stenosis, colonic polyps, hyperlipidemia, prostatism, pulm nodule, hypertension, A1c 6.4, BMI 35, cholelithiasis,. Wife  2021 CLL, atypical Mycobacterium and cirrhosis. Daughters are helping. Tearful and appropriate. Not drinking alcohol. Has been able to sleep. Plans to start walking. Severe thigh cramping transiently. Increased his fluid intake with resolution however nocturia currently. Is on statin but has had no change in dose in the recent past.  No longer drinking alcohol. No abnormal urine. Symptoms are improved     CT abdomen and chest evaluation diverticulitis revealed calcifications with 8 mm nodule right lower lobe. Has been exposed asbestos. History of tobacco.  Denies any shortness of breath or sputum production. History of bronchitis intermittently. Yearly CT. Pulmonary findings stable.      Mildly elevated liver function tests with negative hepatitis C TIBC.   History of alcohol and obesity. Resolved prediabetes. Last liver functions normal   no current alcohol    Asymptomatic gallstones. Has seen surgeon. Prediabetes. With next A1c of 6.4. Weight is down. Following diet. Last level normal.  Cut out carbs. .  No recent walking secondary to his wife's illness.      Hx melanoma.  Yearly follow-up.     Hypertension. Echocardiogram 2018 with trivial aortic mitral and tricuspid regurgitation. Ejection fraction 60-65%. Mild increased left ventricular wall thickness. Mild aortic stenosis. No syncope, exercise intolerance or edema. No TIA-like symptoms.   . Reports stress thallium normal in .   Denies syncope or presyncope is on lisinopril hydrochlorothiazide.  Vytorin.  LDL 67.  Sister with valve disease.     History diverticulosis  betamethasone dipropionate 0.05 % lotion APPLY TOPICALLY TWICE DAILY 60 mL 2    allopurinol (ZYLOPRIM) 300 MG tablet TAKE ONE TABLET BY MOUTH DAILY 90 tablet 2    ezetimibe-simvastatin (VYTORIN) 10-40 MG per tablet TAKE ONE TABLET BY MOUTH DAILY 90 tablet 3    Omega-3 Fatty Acids (FISH OIL) 1000 MG CAPS Take 1,000 mg by mouth 2 times daily               Past Medical History:   Diagnosis Date    Abnormal breath sounds 12/3/2019    Aortic stenosis, mild 03/27/2018    Echo 3/30/18 - mild  with preserved EF    Calculus of gallbladder without cholecystitis without obstruction 12/27/2019    Colitis     Dermatitis 6/24/2019    Diverticulitis     Diverticulosis     Elevated liver enzymes 11/19/2018    Essential hypertension, benign     Gout     Hyperlipidemia     Malignant melanoma of neck (HCC)     Obesity, Class I, BMI 30.0-34.9 (see actual BMI) 6/24/2019    Prostatism 6/24/2019    Follows with urology q 6 month psa.  5.2019 1.6     Pulmonary nodule 12/27/2019    Needs CT 6.2019     PVC's (premature ventricular contractions)        Past Surgical History:   Procedure Laterality Date    BLADDER REPAIR  7/23/1998    partial cystectomy    COLECTOMY      COLONOSCOPY      HERNIA REPAIR  1969, 1999, 2005    SKIN CANCER EXCISION  9/30/2013    TONSILLECTOMY  1947    TUMOR REMOVAL  1970    right forearm             Family History   Problem Relation Age of Onset    Heart Failure Father 66    Stroke Mother 80    Other Sister 21        mastoids    Heart Failure Brother     Heart Failure Brother 68        older brother   Nikky Speaker Sister 68        oldest sister/breast/brain    Cancer Sister 75         Objective     /68   Pulse 85   Temp 97.4 °F (36.3 °C)   Resp 12   Ht 5' 7\" (1.702 m)   Wt 197 lb (89.4 kg)   SpO2 98% Comment: RA  BMI 30.85 kg/m²     @LASTSAO2(3)@    Wt Readings from Last 3 Encounters:   12/14/20 211 lb 3.2 oz (95.8 kg)   07/14/20 209 lb (94.8 kg)   07/13/20 209 lb (94.8 kg)       Physical Exam     NAD alert and cooperative  HEENT: Kiln conjunctive a. Tearful. TMs negative. Throat is clear. Good upstroke of the carotids no bruits. No anterior cervical mass. Lungs rales left base dry. No tachypnea. No wheeze. Cardiovascular exam 3 out of 6 murmur heard throughout the precordium. No ectopy. No gallop rhythm. Abdomen no hepatosplenomegaly or epigastric tenderness. No peripheral edema. Good pulses. Multiple seborrheic keratoses. No worrisome nodules noted  Appropriate affect for grieving    Chemistry        Component Value Date/Time     07/15/2020 0859    K 4.6 07/15/2020 0859     07/15/2020 0859    CO2 23 07/15/2020 0859    BUN 35 (H) 07/15/2020 0859    CREATININE 1.3 07/15/2020 0859        Component Value Date/Time    CALCIUM 9.4 07/15/2020 0859    ALKPHOS 86 06/29/2020 1521    AST 28 06/29/2020 1521    ALT 28 06/29/2020 1521    BILITOT 0.7 06/29/2020 1521            Lab Results   Component Value Date    WBC 8.7 06/29/2020    HGB 14.8 06/29/2020    HCT 45.0 06/29/2020    MCV 96.1 06/29/2020     06/29/2020     Lab Results   Component Value Date    LABA1C 5.6 06/29/2020     Lab Results   Component Value Date    .9 12/17/2019     Lab Results   Component Value Date    LABA1C 5.6 06/29/2020     No components found for: CHLPL  Lab Results   Component Value Date    TRIG 139 06/29/2020    TRIG 110 12/17/2019    TRIG 157 (H) 11/10/2017     Lab Results   Component Value Date    HDL 38 (L) 06/29/2020    HDL 40 12/17/2019    HDL 37 (L) 11/19/2018     Lab Results   Component Value Date    LDLCALC 55 06/29/2020    LDLCALC 58 12/17/2019    LDLCALC 69 11/19/2018     Lab Results   Component Value Date    LABVLDL 28 06/29/2020    LABVLDL 22 12/17/2019    LABVLDL 41 11/19/2018       Old labs and records reviewed or requested  Discussed past lab and studies with patient      Diagnosis Orders   1. Essential hypertension, benign  Comprehensive Metabolic Panel   2. Gout of foot, unspecified cause, unspecified chronicity, unspecified laterality     3. Pure hypercholesterolemia     4. Malignant melanoma of neck (HCC)     5. Calculus of gallbladder without cholecystitis without obstruction     6. Prediabetes     7. Obesity, Class I, BMI 30.0-34.9 (see actual BMI)     8. Prostatism     9. Pulmonary nodule  CT CHEST WO CONTRAST   10. History of prediabetes  Hemoglobin A1C   11. Muscle cramps  CK   12. Prediabetes   Hemoglobin A1C   13. Nocturia  PSA, Total and Free       Hypertension controlled. Refill medication. Basic metabolic profile. History of gout. No recurrence. Hyperlipidemia. Repeat lipid profile later in the year. History of melanoma. No recurrence Derm follow-up pending. Asymptomatic cholelithiasis. Discussed symptoms. Prediabetes. Recheck. Prostatism. PSA. Pulmonary nodule. Ordered CT for July. Muscle cramps.  CPK. No follow-ups on file. Diagnosis and treatment discussed.   Possible side effects of medication reviewed  Patients questions answered  Follow up understood  Pt aware if they are not contacted about any test results , this does not mean they are normal.  They should call

## 2021-03-23 LAB
ESTIMATED AVERAGE GLUCOSE: 111.2 MG/DL
HBA1C MFR BLD: 5.5 %

## 2021-03-25 LAB
PROSTATE SPECIFIC ANTIGEN FREE: 0.3 UG/L
PROSTATE SPECIFIC ANTIGEN PERCENT FREE: 10.3 %
PROSTATE SPECIFIC ANTIGEN: 2.9 UG/L (ref 0–4)

## 2021-04-09 ENCOUNTER — HOSPITAL ENCOUNTER (OUTPATIENT)
Dept: GENERAL RADIOLOGY | Age: 81
Discharge: HOME | End: 2021-04-09
Payer: MEDICARE

## 2021-04-09 ENCOUNTER — HOSPITAL ENCOUNTER (OUTPATIENT)
Age: 81
Discharge: HOME | End: 2021-04-09
Payer: MEDICARE

## 2021-04-09 ENCOUNTER — TELEPHONE (OUTPATIENT)
Dept: FAMILY MEDICINE CLINIC | Age: 81
End: 2021-04-09

## 2021-04-09 DIAGNOSIS — M47.27 OSTEOARTHRITIS OF SPINE WITH RADICULOPATHY, LUMBOSACRAL REGION: ICD-10-CM

## 2021-04-09 DIAGNOSIS — M15.9 PRIMARY OSTEOARTHRITIS INVOLVING MULTIPLE JOINTS: ICD-10-CM

## 2021-04-09 DIAGNOSIS — M47.27 OSTEOARTHRITIS OF SPINE WITH RADICULOPATHY, LUMBOSACRAL REGION: Primary | ICD-10-CM

## 2021-04-09 PROCEDURE — 72100 X-RAY EXAM L-S SPINE 2/3 VWS: CPT

## 2021-04-09 PROCEDURE — 73521 X-RAY EXAM HIPS BI 2 VIEWS: CPT

## 2021-04-09 NOTE — TELEPHONE ENCOUNTER
Saw chiropractor today. Wants pt to have a x-ray 3V lumbar and AP Pelvis. Needs PCP to order. Please advise.  Please call pt when order has been placed at [phone] press option 8

## 2021-04-09 NOTE — TELEPHONE ENCOUNTER
Your film in 2012 showed arthritis and I know this will still be present and probably progressed. Physical therapy, stretching, heat, ice may be helpful. Usually we start with physical therapy.     I have reordered your films if you would like to do this     If agreeable to physical therapy I can order this as well

## 2021-04-13 DIAGNOSIS — R35.1 NOCTURIA: ICD-10-CM

## 2021-04-14 RX ORDER — TAMSULOSIN HYDROCHLORIDE 0.4 MG/1
CAPSULE ORAL
Qty: 90 CAPSULE | Refills: 0 | Status: SHIPPED | OUTPATIENT
Start: 2021-04-14 | End: 2021-07-09

## 2021-04-27 DIAGNOSIS — I10 ESSENTIAL HYPERTENSION, BENIGN: ICD-10-CM

## 2021-04-27 RX ORDER — LISINOPRIL AND HYDROCHLOROTHIAZIDE 20; 12.5 MG/1; MG/1
TABLET ORAL
Qty: 180 TABLET | Refills: 0 | Status: SHIPPED | OUTPATIENT
Start: 2021-04-27 | End: 2021-07-26

## 2021-07-09 DIAGNOSIS — R35.1 NOCTURIA: ICD-10-CM

## 2021-07-09 RX ORDER — TAMSULOSIN HYDROCHLORIDE 0.4 MG/1
CAPSULE ORAL
Qty: 90 CAPSULE | Refills: 0 | Status: SHIPPED | OUTPATIENT
Start: 2021-07-09 | End: 2021-10-11

## 2021-07-22 ENCOUNTER — HOSPITAL ENCOUNTER (OUTPATIENT)
Dept: CT IMAGING | Age: 81
Discharge: HOME | End: 2021-07-22
Payer: MEDICARE

## 2021-07-22 DIAGNOSIS — R91.1 PULMONARY NODULE: ICD-10-CM

## 2021-07-22 PROCEDURE — 71250 CT THORAX DX C-: CPT

## 2021-07-26 DIAGNOSIS — I10 ESSENTIAL HYPERTENSION, BENIGN: ICD-10-CM

## 2021-07-26 RX ORDER — LISINOPRIL AND HYDROCHLOROTHIAZIDE 20; 12.5 MG/1; MG/1
TABLET ORAL
Qty: 180 TABLET | Refills: 0 | Status: SHIPPED | OUTPATIENT
Start: 2021-07-26 | End: 2021-10-27

## 2021-08-12 ENCOUNTER — OFFICE VISIT (OUTPATIENT)
Dept: PULMONOLOGY | Age: 81
End: 2021-08-12
Payer: MEDICARE

## 2021-08-12 VITALS
BODY MASS INDEX: 30.67 KG/M2 | SYSTOLIC BLOOD PRESSURE: 122 MMHG | WEIGHT: 195.4 LBS | HEART RATE: 80 BPM | HEIGHT: 67 IN | OXYGEN SATURATION: 96 % | RESPIRATION RATE: 14 BRPM | TEMPERATURE: 98.4 F | DIASTOLIC BLOOD PRESSURE: 70 MMHG

## 2021-08-12 DIAGNOSIS — R91.8 PULMONARY NODULES: Primary | ICD-10-CM

## 2021-08-12 DIAGNOSIS — R93.89 ABNORMAL CT OF THE CHEST: ICD-10-CM

## 2021-08-12 DIAGNOSIS — J92.0 PLEURAL PLAQUE DUE TO ASBESTOS EXPOSURE: ICD-10-CM

## 2021-08-12 PROCEDURE — 4040F PNEUMOC VAC/ADMIN/RCVD: CPT | Performed by: INTERNAL MEDICINE

## 2021-08-12 PROCEDURE — G8417 CALC BMI ABV UP PARAM F/U: HCPCS | Performed by: INTERNAL MEDICINE

## 2021-08-12 PROCEDURE — 99214 OFFICE O/P EST MOD 30 MIN: CPT | Performed by: INTERNAL MEDICINE

## 2021-08-12 PROCEDURE — 1123F ACP DISCUSS/DSCN MKR DOCD: CPT | Performed by: INTERNAL MEDICINE

## 2021-08-12 PROCEDURE — G8427 DOCREV CUR MEDS BY ELIG CLIN: HCPCS | Performed by: INTERNAL MEDICINE

## 2021-08-12 PROCEDURE — 1036F TOBACCO NON-USER: CPT | Performed by: INTERNAL MEDICINE

## 2021-08-12 NOTE — PROGRESS NOTES
Chief complaint  This is a 68y.o. year old male  who comes to see me with a chief complaint of   Chief Complaint   Patient presents with    Follow-up       HPI  Here with a cc of lung nodule, abnormal CT chest     He is doing well. He has lost weight and is feeling good. He did his yearly CT last month and findings were stable. He did lose his wife earlier this year (she was a patient of mine). He is has new complaints           Current Outpatient Medications:     lisinopril-hydroCHLOROthiazide (PRINZIDE;ZESTORETIC) 20-12.5 MG per tablet, TAKE TWO TABLETS BY MOUTH DAILY, Disp: 180 tablet, Rfl: 0    tamsulosin (FLOMAX) 0.4 MG capsule, TAKE ONE CAPSULE BY MOUTH DAILY, Disp: 90 capsule, Rfl: 0    ezetimibe-simvastatin (VYTORIN) 10-40 MG per tablet, TAKE ONE TABLET BY MOUTH DAILY, Disp: 90 tablet, Rfl: 0    allopurinol (ZYLOPRIM) 300 MG tablet, TAKE ONE TABLET BY MOUTH DAILY, Disp: 90 tablet, Rfl: 0    Omega-3 Fatty Acids (FISH OIL) 1000 MG CAPS, Take 1,000 mg by mouth 2 times daily, Disp: , Rfl:     betamethasone dipropionate 0.05 % lotion, APPLY TOPICALLY TWICE DAILY, Disp: 60 mL, Rfl: 2      PHYSICAL EXAM:  Vitals:    08/12/21 1241   BP: 122/70   Pulse: 80   Resp: 14   Temp: 98.4 °F (36.9 °C)   SpO2: 96%       GENERAL:  Well nourished, alert, appears stated age, no distress  HEENT:  No scleral icterus, no conjunctival irritation  NECK:  No thyromegaly, no bruits  LYMPH:  No cervical or supraclavicular adenopathy  HEART:  Regular rate and rhythm, + murmurs  LUNGS:  Faint crackles   ABDOMEN:  No distention, no organomegaly  EXTREMITIES:  No edema, no digital clubbing  NEURO:  No localizing deficits, CN II-XII intact    Pulmonary Function Testing 8/2020  Mild reduction in DLCO    Chest imaging from 7/2020 is reviewed.   My interpretation is pleural plaques, calcifications, scarring, reticular markings, small LLL nodule vs scarring      I reviewed above labs and studies pertinent to this visit and date    Assessment/Plan:  1. Pulmonary nodules  Stable on imaging. No new new nodules    2. 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 No changes    3. Abnormal CT of the chest  Sequela of asbestos exposure with mild parenchymal involvement. No symptoms and mildly reduced DLCO.   Need to monitor with yearly CT scans for now        Follow up in 6 months

## 2021-08-24 ENCOUNTER — OFFICE VISIT (OUTPATIENT)
Dept: FAMILY MEDICINE CLINIC | Age: 81
End: 2021-08-24
Payer: MEDICARE

## 2021-08-24 VITALS
HEIGHT: 67 IN | DIASTOLIC BLOOD PRESSURE: 72 MMHG | WEIGHT: 193 LBS | BODY MASS INDEX: 30.29 KG/M2 | RESPIRATION RATE: 12 BRPM | OXYGEN SATURATION: 98 % | SYSTOLIC BLOOD PRESSURE: 126 MMHG | HEART RATE: 73 BPM

## 2021-08-24 DIAGNOSIS — I35.0 AORTIC STENOSIS, MILD: ICD-10-CM

## 2021-08-24 DIAGNOSIS — R73.03 PREDIABETES: ICD-10-CM

## 2021-08-24 DIAGNOSIS — K80.20 CALCULUS OF GALLBLADDER WITHOUT CHOLECYSTITIS WITHOUT OBSTRUCTION: ICD-10-CM

## 2021-08-24 DIAGNOSIS — C43.4 MALIGNANT MELANOMA OF NECK (HCC): ICD-10-CM

## 2021-08-24 DIAGNOSIS — I10 ESSENTIAL HYPERTENSION, BENIGN: Primary | ICD-10-CM

## 2021-08-24 DIAGNOSIS — E66.9 OBESITY, CLASS I, BMI 30.0-34.9 (SEE ACTUAL BMI): ICD-10-CM

## 2021-08-24 DIAGNOSIS — E78.00 PURE HYPERCHOLESTEROLEMIA: ICD-10-CM

## 2021-08-24 DIAGNOSIS — M10.9 GOUT OF FOOT, UNSPECIFIED CAUSE, UNSPECIFIED CHRONICITY, UNSPECIFIED LATERALITY: ICD-10-CM

## 2021-08-24 DIAGNOSIS — N40.0 PROSTATISM: ICD-10-CM

## 2021-08-24 LAB
A/G RATIO: 1.8 (ref 1.1–2.2)
ALBUMIN SERPL-MCNC: 4.6 G/DL (ref 3.4–5)
ALP BLD-CCNC: 94 U/L (ref 40–129)
ALT SERPL-CCNC: 27 U/L (ref 10–40)
ANION GAP SERPL CALCULATED.3IONS-SCNC: 15 MMOL/L (ref 3–16)
AST SERPL-CCNC: 26 U/L (ref 15–37)
BILIRUB SERPL-MCNC: 0.7 MG/DL (ref 0–1)
BUN BLDV-MCNC: 22 MG/DL (ref 7–20)
CALCIUM SERPL-MCNC: 9.6 MG/DL (ref 8.3–10.6)
CHLORIDE BLD-SCNC: 100 MMOL/L (ref 99–110)
CHOLESTEROL, TOTAL: 134 MG/DL (ref 0–199)
CO2: 24 MMOL/L (ref 21–32)
CREAT SERPL-MCNC: 1.2 MG/DL (ref 0.8–1.3)
GFR AFRICAN AMERICAN: >60
GFR NON-AFRICAN AMERICAN: 58
GLOBULIN: 2.5 G/DL
GLUCOSE BLD-MCNC: 93 MG/DL (ref 70–99)
HDLC SERPL-MCNC: 47 MG/DL (ref 40–60)
LDL CHOLESTEROL CALCULATED: 67 MG/DL
POTASSIUM SERPL-SCNC: 4 MMOL/L (ref 3.5–5.1)
SODIUM BLD-SCNC: 139 MMOL/L (ref 136–145)
TOTAL PROTEIN: 7.1 G/DL (ref 6.4–8.2)
TRIGL SERPL-MCNC: 100 MG/DL (ref 0–150)
URIC ACID, SERUM: 6.3 MG/DL (ref 3.5–7.2)
VLDLC SERPL CALC-MCNC: 20 MG/DL

## 2021-08-24 PROCEDURE — G8427 DOCREV CUR MEDS BY ELIG CLIN: HCPCS | Performed by: INTERNAL MEDICINE

## 2021-08-24 PROCEDURE — 4040F PNEUMOC VAC/ADMIN/RCVD: CPT | Performed by: INTERNAL MEDICINE

## 2021-08-24 PROCEDURE — 99214 OFFICE O/P EST MOD 30 MIN: CPT | Performed by: INTERNAL MEDICINE

## 2021-08-24 PROCEDURE — 1036F TOBACCO NON-USER: CPT | Performed by: INTERNAL MEDICINE

## 2021-08-24 PROCEDURE — 1123F ACP DISCUSS/DSCN MKR DOCD: CPT | Performed by: INTERNAL MEDICINE

## 2021-08-24 PROCEDURE — 36415 COLL VENOUS BLD VENIPUNCTURE: CPT | Performed by: INTERNAL MEDICINE

## 2021-08-24 PROCEDURE — G8417 CALC BMI ABV UP PARAM F/U: HCPCS | Performed by: INTERNAL MEDICINE

## 2021-08-24 RX ORDER — AZELASTINE 1 MG/ML
1 SPRAY, METERED NASAL 2 TIMES DAILY
Qty: 2 BOTTLE | Refills: 1 | Status: SHIPPED | OUTPATIENT
Start: 2021-08-24 | End: 2022-05-16 | Stop reason: SDUPTHER

## 2021-08-24 NOTE — PATIENT INSTRUCTIONS
diabetes, heart disease, and high blood pressure. · Do not smoke. Smoking can make health problems worse. If you need help quitting, talk to your doctor about stop-smoking programs and medicines. These can increase your chances of quitting for good. · Care for your mental health. It is easy to get weighed down by worry and stress. Learn strategies to manage stress, like deep breathing and mindfulness, and stay connected with your family and community. If you find you often feel sad or hopeless, talk with your doctor. Treatment can help. · Talk to your doctor about whether you have any risk factors for sexually transmitted infections (STIs). You can help prevent STIs if you wait to have sex with a new partner (or partners) until you've each been tested for STIs. It also helps if you use condoms (male or female condoms) and if you limit your sex partners to one person who only has sex with you. Vaccines are available for some STIs. · If you think you may have a problem with alcohol or drug use, talk to your doctor. This includes prescription medicines (such as amphetamines and opioids) and illegal drugs (such as cocaine and methamphetamine). Your doctor can help you figure out what type of treatment is best for you. · Protect your skin from too much sun. When you're outdoors from 10 a.m. to 4 p.m., stay in the shade or cover up with clothing and a hat with a wide brim. Wear sunglasses that block UV rays. Even when it's cloudy, put broad-spectrum sunscreen (SPF 30 or higher) on any exposed skin. · See a dentist one or two times a year for checkups and to have your teeth cleaned. · Wear a seat belt in the car. When should you call for help? Watch closely for changes in your health, and be sure to contact your doctor if you have any problems or symptoms that concern you. Where can you learn more? Go to https://chjavier.[facility]. org and sign in to your [employer] account.  Enter J808 in the [address] Information box to learn more about \"Well Visit, Over 65: Care Instructions. \"     If you do not have an account, please click on the \"Sign Up Now\" link. Current as of: May 27, 2020               Content Version: 12.9  © 5408-9105 Healthwise, Incorporated. Care instructions adapted under license by [facility] ([facility]). If you have questions about a medical condition or this instruction, always ask your healthcare professional. [address] any warranty or liability for your use of this information.

## 2021-08-24 NOTE — PROGRESS NOTES
[facility]    Hospitalist Progress Note    Date of Service (when I saw the patient): 01/26/2017    Assessment and Plan  Malvin Au is a 45 year old male who was admitted on 1/23/2017.    1. 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   PT, OT, SLP consulted. Transcranial dopplers daily to monitor for vasospasm.     2. Hypertensive emergency:   ---patient has been weaned of nicardipine gtts. Now on nimodipine.. Neurocritical care recommending initial Systolic goal of <180.    3. Hyponatremia: can be precursor to vasospasm, thus, Hypertonic saline being given per Neurocritical care. Checking Na every 6 hours.     4. Troponin elevation: likley secondary to #1 and #2. TTE shows mild LVH, negative bubble study.     5. Convulsions Secondary to #1: on keppra.     6. Nausea and vomiting secondary to #1.   Continue IV ativan and zofran.     6.  Skin lesions: Concern for celiac disease: Wife is concerned that patient has celiac disease as he has other family members with this condition    Transglutaminase IgG and IgA are negative. Would defer further workup to outpatient setting if symptoms of diarhea, malabsorption present.     DVT Prophylaxis: Pneumatic Compression Devices  Code Status: Full Code    Disposition: Expected discharge in 4-6 days     Demetrius Farah MD  [phone] (P)  Text Page (7 am to 6 pm)    Interval History  Seen with DUC Heaton    Wife at bedside.    Receiving hypertonic saline due to hyponatremia. Denies new neurologic complaints. Nausea is still an issue, but he feels it is somewhat improved.       -Data reviewed today: I reviewed all new labs and imaging results over the last 24 hours    dPhysical Exam  Temp: 98.2  F (36.8  C) Temp src: Oral BP: (!) 170/108 mmHg   Heart Rate: 80 Resp: 25 SpO2: 98 % O2 Device: None (Room air)   HPI: Ismael Chisholm presents for follow-up     chronic health issues include history neck  melanoma, partial colectomy and cystectomy for diverticulitis and fissure, assymptomatic aortic stenosis/AI and calcification of mitral valve, colonic polyps, hyperlipidemia, prostatism,  stable pulm nodule, hypertension, A1c 6.4, BMI 35, cholelithiasis,. Rhinitis. wants to try astelin. Occurs in the day and nighttime. Friend uses this medicine successfully     Wife  2021 CLL, atypical Mycobacterium and cirrhosis. Dusting. Socializing. Walking every morning. Plans to go to [employer] to one of their old places in 2022. Resolved thigh cramping after using electrolyte water. Nocturia every 2 hours at nighttime. Flomax. Mildly elevated prostate doing well.      CT abdomen and chest evaluation diverticulitis revealed calcifications with 8 mm nodule right lower lobe.  Has been exposed asbestos.  History of tobacco.  Denies any shortness of breath or sputum production.  History of bronchitis intermittently.    Yearly CT. Pulmonary findings stable last CT 2021.      Mildly elevated liver function tests with negative hepatitis C TIBC.   History of alcohol and obesity. Resolved prediabetes with A1c down to 5.2021. Last liver functions normal . Lost over 30 pounds with diet and exercise     Asymptomatic gallstones. Has seen surgeon. Aware of symptoms   .  History of prediabetes Hx melanoma.  Yearly follow-up. 0.0911 with 2 mole excision neg      Hypertension. Echocardiogram 2018 with trivial aortic stenosis/regurgitation mitral and tricuspid regurgitation. Ejection fraction 60-65%. Mild increased left ventricular wall thickness. Mild aortic stenosis. No syncope, exercise intolerance or edema. No TIA-like symptoms.   . Reports stress thallium normal in .   Denies syncope or presyncope is on lisinopril hydrochlorothiazide.  Vytorin.  LDL 67.  Sister with valve disease    Filed Vitals:    01/23/17 1133   Weight: 104.327 kg (230 lb)     Vital Signs with Ranges  Temp:  [98  F (36.7  C)-98.5  F (36.9  C)] 98.2  F (36.8  C)  Heart Rate:  [64-96] 80  Resp:  [9-35] 25  BP: (143-175)/() 170/108 mmHg  SpO2:  [92 %-99 %] 98 %  I/O last 3 completed shifts:  In: 3945 [P.O.:1070; I.V.:2875]  Out: 1700 [Urine:1600; Emesis/NG output:100]    Constitutional: No acute distress  HEENT: no facial asymmetry. EOMI  Respiratory: Clear to auscultation bilaterally, no crackles  Cardiovascular: Regular rate and rhythm, S1, S2, no murmurs  GI: Abdomen soft, nontender, nondistended, bowel sounds are heard  Skin/Integumen: No jaundice, no suspicious rash  NEURO: CN  II-XII grossly intact.      Medications    IV infusion builder WITH LARGE additive list 75 mL/hr at 01/26/17 0921     niCARdipine 40 mg in 200 mL 0.9% NaCl Stopped (01/24/17 1021)     NaCl 1,000 mL (01/26/17 0622)     - MEDICATION INSTRUCTIONS -         cyanocobalamin  1,000 mcg Sublingual Daily     levETIRAcetam  500 mg Oral BID     niMODipine  60 mg Oral Q4H     ondansetron  4 mg Intravenous Q6H ZACKERY     sodium chloride (PF)  3 mL Intracatheter Q8H       Data    Recent Labs  Lab 01/26/17  1213 01/26/17  0607 01/26/17  0500 01/24/17  2325 01/24/17  1724 01/24/17  1135 01/24/17  0455  01/23/17  0915   WBC 13.3*  --   --   --   --   --  11.6*  --  6.3   HGB 13.7  --   --   --   --   --  13.0*  --  14.4   MCV 88  --   --   --   --   --  90  --  89     --   --   --   --   --  196  --  180   * 127* 127*  --   --   --  137  --  140   POTASSIUM  --   --  3.6  --   --   --  3.6  --  3.5   CHLORIDE  --   --  95  --   --   --  102  --  105   CO2  --   --  25  --   --   --  26  --  29   BUN  --   --  10  --   --   --  6*  --  12   CR  --   --  0.58*  --   --   --  0.64*  --  0.68   ANIONGAP  --   --  7  --   --   --  9  --  6   RADHA  --   --  8.4*  --   --   --  8.0*  --  8.6   GLC  --   --  115*  --   --   --  132*  --  124*   TROPI  --    72,021.     History diverticulosis partial colectomy and cystectomy for fistulas. Occasional left lower quadrant discomfort. No blood in the stool or vomiting. Last colonoscopy August 2013. No GE reflux. No constipation.     Gout well-controlled with allopurinol. Last flare 94.     Recent elevation of PSA now down to 1.6  Flomax.  Prostatism. No longer in following with urologist          Specialists    Derm,     [facility]    Urology no longer       PMH:   tonsillecomtoy   hernia repair left   fatty tumor forearm  Sigmoid colectomy and blader repair. Partial cystectomy  Ventral hernia repair with mesh  Melanoma      FH: - melanoma,colon, prostate cancer          + heart disease MI, valve disease.      SH : History tobacco. Positive alcohol. No recreational drugs. Retired.   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  No falls or confusion. Positive rhinitis no wheezing shortness of breath hemoptysis. Follows with pulmonary. No palpitations lower extremity edema exercise intolerance. No TIA or claudication. No GE reflux bloody stools constipation or recurrent diverticular disease. Positive prostatism nocturia greater than 2. Rare knee pain. Dermatologist yearly. Not Sabianism on sunscreen use. Grieving appropriately.      Constitutional, ent, CV, respiratory, GI, , joint, skin, allergic and psychiatric ROS reviewed and negative except for above    Allergies   Allergen Reactions    Guaifenesin & Derivatives      Nervous.  Metamucil [Psyllium]      Chest pain.  Norflex Tablets [Orphenadrine]      Urination issues.        Outpatient Medications Marked as Taking for the 8/24/21 encounter (Office Visit) with Jaxon Quinonez MD   Medication Sig Dispense Refill    lisinopril-hydroCHLOROthiazide (PRINZIDE;ZESTORETIC) 20-12.5 MG per tablet TAKE TWO TABLETS BY MOUTH DAILY 180 tablet 0    tamsulosin (FLOMAX) 0.4 MG capsule TAKE ONE CAPSULE BY MOUTH DAILY 90 capsule 0    --   --  0.084* 0.062* 0.095* 0.095*  < >  --    < > = values in this interval not displayed.    Recent Results (from the past 24 hour(s))   US Transcranial Doppler Complete Port    Narrative    TRANSCRANIAL DOPPLER ULTRASOUND January 26, 2017 8:58 AM     HISTORY: Vasospasm.    COMPARISON: 1/25/2017.    FINDINGS: Mean velocities are as follows:    Right M1:   54 cm/sec  Right A1:    23 cm/sec  Right PCA: 23 cm/sec  Right ICA:   30 cm/sec; Lindegaard index: 1.8.    Left M1:   23 cm/sec  Left A1:    43 cm/sec  Left PCA: 42 cm/sec  Left ICA: 26 cm/sec; Lindegaard index: 0.9.      Impression    IMPRESSION: No evidence of vasospasm.    GRATN ALEXANDRA MD        ezetimibe-simvastatin (VYTORIN) 10-40 MG per tablet TAKE ONE TABLET BY MOUTH DAILY 90 tablet 0    allopurinol (ZYLOPRIM) 300 MG tablet TAKE ONE TABLET BY MOUTH DAILY 90 tablet 0    Omega-3 Fatty Acids (FISH OIL) 1000 MG CAPS Take 1,000 mg by mouth 2 times daily               Past Medical History:   Diagnosis Date    Abnormal breath sounds 12/3/2019    Aortic stenosis, mild 03/27/2018    Echo 3/30/18 - mild  with preserved EF    Calculus of gallbladder without cholecystitis without obstruction 12/27/2019    Colitis     Dermatitis 6/24/2019    Diverticulitis     Diverticulosis     Elevated liver enzymes 11/19/2018    Essential hypertension, benign     Gout     Hyperlipidemia     Malignant melanoma of neck (HCC)     Obesity, Class I, BMI 30.0-34.9 (see actual BMI) 6/24/2019    Prostatism 6/24/2019    Follows with urology q 6 month psa. 5.2019 1.6     Pulmonary nodule 12/27/2019    Needs CT 6.2019     PVC's (premature ventricular contractions)        Past Surgical History:   Procedure Laterality Date    BLADDER REPAIR  7/23/1998    partial cystectomy    COLECTOMY      COLONOSCOPY      HERNIA REPAIR  1969, 1999, 2005    SKIN CANCER EXCISION  9/30/2013    TONSILLECTOMY  1947    TUMOR REMOVAL  1970    right forearm             Family History   Problem Relation Age of Onset    Heart Failure Father 66    Stroke Mother 80    Other Sister 21        mastoids    Heart Failure Brother     Heart Failure Brother 68        older brother   [place] Sister 68        oldest sister/breast/brain    Cancer Sister 75         Objective     /72   Pulse 73   Resp 12   Ht 5' 7\" (1.702 m)   Wt 193 lb (87.5 kg)   SpO2 98% Comment: RA  BMI 30.23 kg/m²     @LASTSAO2(3)@    Wt Readings from Last 3 Encounters:   08/12/21 195 lb 6.4 oz (88.6 kg)   03/22/21 197 lb (89.4 kg)   12/14/20 211 lb 3.2 oz (95.8 kg)       Physical Exam     NAD alert and cooperative  HEENT: TMs unremarkable. Throat is clear.   Abilio Washington dentition. Pink conjunctive a. No bruits good upstroke carotid. Dry rales right less than left. Good inspiration. No wheeze or rhonchi. Cardiovascular exam 2 out of 6 murmur heard throughout the precordium holosystolic. Abdomen benign no hepatosplenomegaly epigastric tenderness or mass. Good pulses lower extremity. Soft varicosities. Sensation intact in the feet. No maceration. No bony abnormality. Chemistry        Component Value Date/Time     03/22/2021 1028    K 4.2 03/22/2021 1028     03/22/2021 1028    CO2 23 03/22/2021 1028    BUN 26 (H) 03/22/2021 1028    CREATININE 1.3 03/22/2021 1028        Component Value Date/Time    CALCIUM 9.5 03/22/2021 1028    ALKPHOS 97 03/22/2021 1028    AST 27 03/22/2021 1028    ALT 25 03/22/2021 1028    BILITOT 0.7 03/22/2021 1028            Lab Results   Component Value Date    WBC 8.7 06/29/2020    HGB 14.8 06/29/2020    HCT 45.0 06/29/2020    MCV 96.1 06/29/2020     06/29/2020     Lab Results   Component Value Date    LABA1C 5.5 03/22/2021     Lab Results   Component Value Date    .2 03/22/2021     Lab Results   Component Value Date    LABA1C 5.5 03/22/2021     No components found for: CHLPL  Lab Results   Component Value Date    TRIG 139 06/29/2020    TRIG 110 12/17/2019    TRIG 157 (H) 11/10/2017     Lab Results   Component Value Date    HDL 38 (L) 06/29/2020    HDL 40 12/17/2019    HDL 37 (L) 11/19/2018     Lab Results   Component Value Date    LDLCALC 55 06/29/2020    LDLCALC 58 12/17/2019    LDLCALC 69 11/19/2018     Lab Results   Component Value Date    LABVLDL 28 06/29/2020    LABVLDL 22 12/17/2019    LABVLDL 41 11/19/2018       Old labs and records reviewed or requested  Discussed past lab and studies with patient      Diagnosis Orders   1. Essential hypertension, benign  Comprehensive Metabolic Panel   2. Gout of foot, unspecified cause, unspecified chronicity, unspecified laterality  Uric Acid   3.  Pure hypercholesterolemia Lipid Panel   4. Calculus of gallbladder without cholecystitis without obstruction  Comprehensive Metabolic Panel   5. Aortic stenosis, mild     6. Malignant melanoma of neck (HCC)     7. Obesity, Class I, BMI 30.0-34.9 (see actual BMI)     8. Prediabetes  Hemoglobin A1C   9. Prostatism       Hypertension good control on current medication. Gout history. Uric acid no flares. Hyperlipidemia lipid profile today    Asymptomatic gallstones. Aortic stenosis/insufficiency and other valvular disease stable. Echocardiogram next year. History of melanoma no current lesions yearly follow-up. Obesity. Successful weight loss. Prediabetes A1c. Prostatism. Continue on Flomax. PSA next visit    General health maintenance distant past tetanus booster and Covid booster as well as flu vaccine in the future      No follow-ups on file. Diagnosis and treatment discussed.   Possible side effects of medication reviewed  Patients questions answered  Follow up understood  Pt aware if they are not contacted about any test results , this does not mean they are normal.  They should call

## 2021-08-25 LAB
ESTIMATED AVERAGE GLUCOSE: 114 MG/DL
HBA1C MFR BLD: 5.6 %

## 2021-09-29 NOTE — TELEPHONE ENCOUNTER
A user error has taken place: encounter opened in error, closed for administrative reasons. Low Back Arthritis: Exercises  Your Care Instructions  Here are some examples of typical rehabilitation exercises for your condition. Start each exercise slowly. Ease off the exercise if you start to have pain. Your doctor or physical therapist will tell you when you can start these exercises and which ones will work best for you. When you are not being active, find a comfortable position for rest. Some people are comfortable on the floor or a medium-firm bed with a small pillow under their head and another under their knees. Some people prefer to lie on their side with a pillow between their knees. Don't stay in one position for too long. Take short walks (10 to 20 minutes) every 2 to 3 hours. Avoid slopes, hills, and stairs until you feel better. Walk only distances you can manage without pain, especially leg pain. How to do the exercises  Pelvic tilt    1. Lie on your back with your knees bent. 2. \"Brace\" your stomach--tighten your muscles by pulling in and imagining your belly button moving toward your spine. 3. Press your lower back into the floor. You should feel your hips and pelvis rock back. 4. Hold for 6 seconds while breathing smoothly. 5. Relax and allow your pelvis and hips to rock forward. 6. Repeat 8 to 12 times. Back stretches    1. Get down on your hands and knees on the floor. 2. Relax your head and allow it to droop. Round your back up toward the ceiling until you feel a nice stretch in your upper, middle, and lower back. Hold this stretch for as long as it feels comfortable, or about 15 to 30 seconds. 3. Return to the starting position with a flat back while you are on your hands and knees. 4. Let your back sway by pressing your stomach toward the floor. Lift your buttocks toward the ceiling. 5. Hold this position for 15 to 30 seconds. 6. Repeat 2 to 4 times. Follow-up care is a key part of your treatment and safety.  Be sure to make and go to all appointments, and call your doctor if you are having problems. It's also a good idea to know your test results and keep a list of the medicines you take. Where can you learn more? Go to http://meena-juan r.info/. Enter X712 in the search box to learn more about \"Low Back Arthritis: Exercises. \"  Current as of: March 21, 2017  Content Version: 11.3  © 2190-8589 [employer]. Care instructions adapted under license by [employer] (which disclaims liability or warranty for this information). If you have questions about a medical condition or this instruction, always ask your healthcare professional. [address] any warranty or liability for your use of this information.

## 2021-10-25 ENCOUNTER — TELEPHONE (OUTPATIENT)
Dept: PULMONOLOGY | Age: 81
End: 2021-10-25

## 2021-10-25 NOTE — TELEPHONE ENCOUNTER
Patient calls in today requesting letter written by Dr. 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 I did not see documentation in the chart note, so therefore a letter would need to be generated. He has an appointment with Edgardo Valdez MD 11/2 , patient will  in office please advise when ready.

## 2021-10-27 DIAGNOSIS — I10 ESSENTIAL HYPERTENSION, BENIGN: ICD-10-CM

## 2021-10-27 RX ORDER — LISINOPRIL AND HYDROCHLOROTHIAZIDE 20; 12.5 MG/1; MG/1
TABLET ORAL
Qty: 180 TABLET | Refills: 0 | Status: SHIPPED | OUTPATIENT
Start: 2021-10-27 | End: 2022-01-25 | Stop reason: SDUPTHER

## 2021-11-08 RX ORDER — HYDROCORTISONE BUTYRATE 1 MG/G
OINTMENT TOPICAL
Qty: 45 G | Refills: 0 | Status: SHIPPED | OUTPATIENT
Start: 2021-11-08

## 2021-12-20 ENCOUNTER — TELEPHONE (OUTPATIENT)
Dept: FAMILY MEDICINE CLINIC | Age: 81
End: 2021-12-20

## 2021-12-20 NOTE — TELEPHONE ENCOUNTER
Pt stated should is it safe for him take herbal supplement or not I did explain to the pt the side effects of this medication and pt would like to try this supplement

## 2021-12-20 NOTE — TELEPHONE ENCOUNTER
Side effects can be headache, dizziness and nausea. One study showed 3 % increase in blood pressure.

## 2021-12-20 NOTE — TELEPHONE ENCOUNTER
Wanting to know if you think it is okay for him to take nature's bounty herbal supplement sawpalmetto 450 mg? Has a friend that is taking it 1 a day. Please advise.  Please call Sotero Johnson back at [phone]

## 2022-01-19 ENCOUNTER — TELEPHONE (OUTPATIENT)
Dept: FAMILY MEDICINE CLINIC | Age: 82
End: 2022-01-19

## 2022-01-21 ENCOUNTER — TELEPHONE (OUTPATIENT)
Dept: FAMILY MEDICINE CLINIC | Age: 82
End: 2022-01-21

## 2022-01-25 DIAGNOSIS — I10 ESSENTIAL HYPERTENSION, BENIGN: ICD-10-CM

## 2022-01-25 RX ORDER — LISINOPRIL AND HYDROCHLOROTHIAZIDE 20; 12.5 MG/1; MG/1
TABLET ORAL
Qty: 180 TABLET | Refills: 0 | Status: SHIPPED | OUTPATIENT
Start: 2022-01-25 | End: 2022-03-01 | Stop reason: SDUPTHER

## 2022-01-25 NOTE — TELEPHONE ENCOUNTER
Fax received from [facility].  that patient would like refill sent. Pharmacy added.     [facility]    F/u 3/1/22

## 2022-02-23 ENCOUNTER — OFFICE VISIT (OUTPATIENT)
Dept: PULMONOLOGY | Age: 82
End: 2022-02-23
Payer: COMMERCIAL

## 2022-02-23 VITALS
HEIGHT: 67 IN | DIASTOLIC BLOOD PRESSURE: 70 MMHG | TEMPERATURE: 96.7 F | HEART RATE: 60 BPM | BODY MASS INDEX: 30.35 KG/M2 | RESPIRATION RATE: 16 BRPM | WEIGHT: 193.4 LBS | SYSTOLIC BLOOD PRESSURE: 124 MMHG | OXYGEN SATURATION: 99 %

## 2022-02-23 DIAGNOSIS — J92.0 PLEURAL PLAQUE DUE TO ASBESTOS EXPOSURE: ICD-10-CM

## 2022-02-23 DIAGNOSIS — R91.8 PULMONARY NODULES: Primary | ICD-10-CM

## 2022-02-23 DIAGNOSIS — R93.89 ABNORMAL CT OF THE CHEST: ICD-10-CM

## 2022-02-23 PROCEDURE — 1123F ACP DISCUSS/DSCN MKR DOCD: CPT | Performed by: INTERNAL MEDICINE

## 2022-02-23 PROCEDURE — G8417 CALC BMI ABV UP PARAM F/U: HCPCS | Performed by: INTERNAL MEDICINE

## 2022-02-23 PROCEDURE — 1036F TOBACCO NON-USER: CPT | Performed by: INTERNAL MEDICINE

## 2022-02-23 PROCEDURE — 4040F PNEUMOC VAC/ADMIN/RCVD: CPT | Performed by: INTERNAL MEDICINE

## 2022-02-23 PROCEDURE — G8427 DOCREV CUR MEDS BY ELIG CLIN: HCPCS | Performed by: INTERNAL MEDICINE

## 2022-02-23 PROCEDURE — G8484 FLU IMMUNIZE NO ADMIN: HCPCS | Performed by: INTERNAL MEDICINE

## 2022-02-23 PROCEDURE — 99214 OFFICE O/P EST MOD 30 MIN: CPT | Performed by: INTERNAL MEDICINE

## 2022-02-23 NOTE — PROGRESS NOTES
Chief complaint  This is a 80y.o. year old male  who comes to see me with a chief complaint of   Chief Complaint   Patient presents with    Follow-up     nodules       HPI  Here with a cc of lung nodule, abnormal CT chest     Doing well. No new issues. [facility] due [facility] for [facility] exposure. He has no real symptoms. Claudean Flair he recently got . Wife  last year. She was a patient of mine.   Overall doing well           Current Outpatient Medications:     lisinopril-hydroCHLOROthiazide (PRINZIDE;ZESTORETIC) 20-12.5 MG per tablet, TAKE TWO TABLETS BY MOUTH DAILY, Disp: 180 tablet, Rfl: 0    hydrocortisone (LOCOID) 0.1 % OINT oitment, APPLY UP TO TWICE A DAY AS NEEDED FOR RASH, Disp: 45 g, Rfl: 0    tamsulosin (FLOMAX) 0.4 MG capsule, TAKE ONE CAPSULE BY MOUTH DAILY, Disp: 90 capsule, Rfl: 1    hydrocortisone (ANUSOL-HC) 2.5 % CREA rectal cream, APPLY TO AFFECTED AREA(S) TWO TIMES A DAY, Disp: 28 g, Rfl: 5    allopurinol (ZYLOPRIM) 300 MG tablet, TAKE ONE TABLET BY MOUTH DAILY, Disp: 90 tablet, Rfl: 3    ezetimibe-simvastatin (VYTORIN) 10-40 MG per tablet, TAKE ONE TABLET BY MOUTH DAILY, Disp: 90 tablet, Rfl: 1    azelastine (ASTELIN) 0.1 % nasal spray, 1 spray by Nasal route 2 times daily Use in each nostril as directed, Disp: 2 Bottle, Rfl: 1    betamethasone dipropionate 0.05 % lotion, APPLY TOPICALLY TWICE DAILY, Disp: 60 mL, Rfl: 2    Omega-3 Fatty Acids (FISH OIL) 1000 MG CAPS, Take 1,000 mg by mouth 2 times daily, Disp: , Rfl:       PHYSICAL EXAM:  Vitals:    22 1302   BP: 124/70   Pulse: 60   Resp: 16   Temp: 96.7 °F (35.9 °C)   SpO2: 99%       GENERAL:  Well nourished, alert, appears stated age, no distress  HEENT:  No scleral icterus, no conjunctival irritation  NECK:  No thyromegaly, no bruits  LYMPH:  No cervical or supraclavicular adenopathy  HEART:  Regular rate and rhythm, + murmurs  LUNGS:  Faint crackles   ABDOMEN:  No distention, no organomegaly  EXTREMITIES:  No edema, no digital clubbing  NEURO:  No localizing deficits, CN II-XII intact    Pulmonary Function Testing 8/2020  Mild reduction in DLCO    Chest imaging from 7/2021 is reviewed. My interpretation is pleural plaques, calcifications, scarring, reticular markings, small LLL nodule vs scarring      I reviewed above labs and studies pertinent to this visit and date    Assessment/Plan:  1. Pulmonary nodules  Stable on imaging. CT to monitor due July 2022. Ordered today    2. 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 CT 7/22. I told him to get a copy of his PFT to include into his hard chart since we have PFTs from 8/20  - CT CHEST WO CONTRAST; Future    3. Abnormal CT of the chest  Sequela of asbestos exposure. Stable based on the last two CT. Needs yearly monitoring. No symptoms at this time   - CT CHEST WO CONTRAST;  Future      Follow up in 6 months

## 2022-03-01 ENCOUNTER — OFFICE VISIT (OUTPATIENT)
Dept: FAMILY MEDICINE CLINIC | Age: 82
End: 2022-03-01
Payer: COMMERCIAL

## 2022-03-01 VITALS
HEART RATE: 56 BPM | WEIGHT: 191 LBS | DIASTOLIC BLOOD PRESSURE: 60 MMHG | BODY MASS INDEX: 29.98 KG/M2 | OXYGEN SATURATION: 98 % | SYSTOLIC BLOOD PRESSURE: 120 MMHG | RESPIRATION RATE: 12 BRPM | HEIGHT: 67 IN

## 2022-03-01 DIAGNOSIS — M10.9 GOUT OF FOOT, UNSPECIFIED CAUSE, UNSPECIFIED CHRONICITY, UNSPECIFIED LATERALITY: ICD-10-CM

## 2022-03-01 DIAGNOSIS — N40.0 PROSTATISM: ICD-10-CM

## 2022-03-01 DIAGNOSIS — E66.9 OBESITY, CLASS I, BMI 30.0-34.9 (SEE ACTUAL BMI): ICD-10-CM

## 2022-03-01 DIAGNOSIS — I10 ESSENTIAL HYPERTENSION, BENIGN: Primary | ICD-10-CM

## 2022-03-01 DIAGNOSIS — I35.0 AORTIC STENOSIS, MILD: ICD-10-CM

## 2022-03-01 DIAGNOSIS — R00.2 PALPITATIONS: ICD-10-CM

## 2022-03-01 DIAGNOSIS — C43.4 MALIGNANT MELANOMA OF NECK (HCC): ICD-10-CM

## 2022-03-01 DIAGNOSIS — R00.8 TRIGEMINY: ICD-10-CM

## 2022-03-01 DIAGNOSIS — K80.20 CALCULUS OF GALLBLADDER WITHOUT CHOLECYSTITIS WITHOUT OBSTRUCTION: ICD-10-CM

## 2022-03-01 DIAGNOSIS — E78.00 PURE HYPERCHOLESTEROLEMIA: ICD-10-CM

## 2022-03-01 LAB
ANION GAP SERPL CALCULATED.3IONS-SCNC: 16 MMOL/L (ref 3–16)
BUN BLDV-MCNC: 35 MG/DL (ref 7–20)
CALCIUM SERPL-MCNC: 9.5 MG/DL (ref 8.3–10.6)
CHLORIDE BLD-SCNC: 105 MMOL/L (ref 99–110)
CO2: 23 MMOL/L (ref 21–32)
CREAT SERPL-MCNC: 1.3 MG/DL (ref 0.8–1.3)
GFR AFRICAN AMERICAN: >60
GFR NON-AFRICAN AMERICAN: 53
GLUCOSE BLD-MCNC: 92 MG/DL (ref 70–99)
MAGNESIUM: 2.4 MG/DL (ref 1.8–2.4)
POTASSIUM SERPL-SCNC: 4.2 MMOL/L (ref 3.5–5.1)
SODIUM BLD-SCNC: 144 MMOL/L (ref 136–145)
URIC ACID, SERUM: 5.8 MG/DL (ref 3.5–7.2)

## 2022-03-01 PROCEDURE — 1036F TOBACCO NON-USER: CPT | Performed by: INTERNAL MEDICINE

## 2022-03-01 PROCEDURE — G8427 DOCREV CUR MEDS BY ELIG CLIN: HCPCS | Performed by: INTERNAL MEDICINE

## 2022-03-01 PROCEDURE — G8417 CALC BMI ABV UP PARAM F/U: HCPCS | Performed by: INTERNAL MEDICINE

## 2022-03-01 PROCEDURE — 36415 COLL VENOUS BLD VENIPUNCTURE: CPT | Performed by: INTERNAL MEDICINE

## 2022-03-01 PROCEDURE — G8484 FLU IMMUNIZE NO ADMIN: HCPCS | Performed by: INTERNAL MEDICINE

## 2022-03-01 PROCEDURE — 4040F PNEUMOC VAC/ADMIN/RCVD: CPT | Performed by: INTERNAL MEDICINE

## 2022-03-01 PROCEDURE — 1123F ACP DISCUSS/DSCN MKR DOCD: CPT | Performed by: INTERNAL MEDICINE

## 2022-03-01 PROCEDURE — 93000 ELECTROCARDIOGRAM COMPLETE: CPT | Performed by: INTERNAL MEDICINE

## 2022-03-01 PROCEDURE — 99214 OFFICE O/P EST MOD 30 MIN: CPT | Performed by: INTERNAL MEDICINE

## 2022-03-01 RX ORDER — ALLOPURINOL 300 MG/1
TABLET ORAL
Qty: 90 TABLET | Refills: 3 | Status: SHIPPED | OUTPATIENT
Start: 2022-03-01 | End: 2022-09-26

## 2022-03-01 RX ORDER — TADALAFIL 5 MG/1
5 TABLET ORAL DAILY
Qty: 30 TABLET | Refills: 0 | Status: SHIPPED | OUTPATIENT
Start: 2022-03-01 | End: 2022-09-27

## 2022-03-01 RX ORDER — EZETIMIBE AND SIMVASTATIN 10; 40 MG/1; MG/1
TABLET ORAL
Qty: 90 TABLET | Refills: 1 | Status: SHIPPED | OUTPATIENT
Start: 2022-03-01 | End: 2022-10-17 | Stop reason: SDUPTHER

## 2022-03-01 RX ORDER — LISINOPRIL AND HYDROCHLOROTHIAZIDE 20; 12.5 MG/1; MG/1
TABLET ORAL
Qty: 180 TABLET | Refills: 1 | Status: SHIPPED | OUTPATIENT
Start: 2022-03-01 | End: 2022-10-17 | Stop reason: SDUPTHER

## 2022-03-01 SDOH — ECONOMIC STABILITY: FOOD INSECURITY: WITHIN THE PAST 12 MONTHS, YOU WORRIED THAT YOUR FOOD WOULD RUN OUT BEFORE YOU GOT MONEY TO BUY MORE.: NEVER TRUE

## 2022-03-01 SDOH — ECONOMIC STABILITY: FOOD INSECURITY: WITHIN THE PAST 12 MONTHS, THE FOOD YOU BOUGHT JUST DIDN'T LAST AND YOU DIDN'T HAVE MONEY TO GET MORE.: NEVER TRUE

## 2022-03-01 ASSESSMENT — SOCIAL DETERMINANTS OF HEALTH (SDOH): HOW HARD IS IT FOR YOU TO PAY FOR THE VERY BASICS LIKE FOOD, HOUSING, MEDICAL CARE, AND HEATING?: NOT HARD AT ALL

## 2022-03-01 NOTE — PATIENT INSTRUCTIONS
Suresh Durán MD  [address] [place] [address], [address]  [place], Nadeem5 ANGEL Ruiz   [phone]    Trial cialis for urine flow and sexual function    Sunscreen. Remember about your gallstones if you ever have symptoms. Call for cardiology appointment above. Patient Education        Premature Heartbeat: Care Instructions  Your Care Instructions     A premature heartbeat happens when the heart beats earlier than it should. This briefly interrupts the heart's rhythm. You do not usually feel the early heartbeat, and the next beat is stronger. To many people, this feels like a skipped heartbeat or a flutter. This heartbeat is also called a premature ventricular contraction (PVC). If you have no heart problems, premature heartbeats that happen once in a while are not a cause for concern. Most people have them at some time. They may happen more often if you drink a lot of caffeine or alcohol or are under stress. Usually, no cause for a premature heartbeat is found, and no treatment is needed. Some people may take medicine to prevent these heartbeats and to relieve symptoms. Follow-up care is a key part of your treatment and safety. Be sure to make and go to all appointments, and call your doctor if you are having problems. It's also a good idea to know your test results and keep a list of the medicines you take. How can you care for yourself at home? · Limit caffeine and other stimulants if they trigger premature heartbeats. · Reduce stress. Avoid people and places that make you feel anxious, if you can. Learn ways to reduce stress, such as biofeedback, guided imagery, and meditation. · Do not smoke or allow others to smoke around you. If you need help quitting, talk to your doctor about stop-smoking programs and medicines. These can increase your chances of quitting for good. · Get at least 30 minutes of exercise on most days of the week. Walking is a good choice.  You also may want to do other activities, such as running, swimming, cycling, or playing tennis or team sports. · Eat heart-healthy foods. · Stay at a healthy weight. Lose weight if you need to. · Get enough sleep. Keep your room dark and quiet, and try to go to bed at the same time every night. · Limit alcohol to 2 drinks a day for men and 1 drink a day for women. Too much alcohol can cause health problems. If drinking alcohol causes more premature heartbeats, do not drink it. · If your doctor prescribes medicine, take it exactly as prescribed. Call your doctor if you think you are having a problem with your medicine. When should you call for help? Call 911 anytime you think you may need emergency care. For example, call if:    · You passed out (lost consciousness). Call your doctor now or seek immediate medical care if:    · You are dizzy or lightheaded, or you feel like you may faint.     · You are short of breath. Watch closely for changes in your health, and be sure to contact your doctor if you have any problems. Where can you learn more? Go to [email or web address] and sign in to your Coupang account. Enter E737 in the PubMatic box to learn more about \"Premature Heartbeat: Care Instructions. \"     If you do not have an account, please click on the \"Sign Up Now\" link. Current as of: April 29, 2021               Content Version: 13.1  © 1369-7158 Healthwise, Incorporated. Care instructions adapted under license by [facility] ([facility]). If you have questions about a medical condition or this instruction, always ask your healthcare professional. [address] any warranty or liability for your use of this information.

## 2022-03-01 NOTE — PROGRESS NOTES
HPI: Bandar Westfall presents for follow up    chronic health issues include history neck  melanoma, partial colectomy and cystectomy for diverticulitis and fissure, assymptomatic aortic stenosis/AI and calcification of mitral valve, colonic polyps, hyperlipidemia, prostatism,  stable pulm nodule, hypertension, A1c 6.4, BMI 35, cholelithiasis,. Chronic rhinitis, Astelin beneficial      Wife  2021 CLL, atypical Mycobacterium and cirrhosis.    Socializing. Walking every morning. Went to [place] to one of their old places in 2022.  from someone in the [employer].       Resolved thigh cramping after using electrolyte water. Nocturia every 2 hours at nighttime. Flomax. Mildly elevated prostate doing well.      CT abdomen and chest evaluation diverticulitis revealed calcifications with 8 mm nodule right lower lobe.  Has been exposed asbestos.  History of tobacco.  Denies any shortness of breath or sputum production.  History of bronchitis intermittently.    Yearly CT.  Pulmonary findings stable last CT 2021. Dr. Enrique Velez pulmonary      Mildly elevated liver function tests with negative hepatitis C, TIBC.   History of alcohol and obesity.  Resolved prediabetes with A1c down to 5.2021.  Last liver functions normal . Lost over 30 pounds with diet and exercise     Asymptomatic gallstones.  Has seen surgeon. Aware of symptoms   .  History of prediabetes. Last check normal.  Weight is down. Active but no formal exercise     Hx melanoma.  Yearly follow-up.  with 2 mole excision neg  pathology recently.      Hypertension. Echocardiogram 2018 with trivial aortic stenosis/regurgitation mitral and tricuspid regurgitation. Ejection fraction 60-65%. Mild increased left ventricular wall thickness. No syncope, exercise intolerance or edema.  No TIA-like symptoms.  . Reports stress thallium normal in .   Denies syncope or presyncope is on lisinopril hydrochlorothiazide.  Vytorin.  LDL 79.  Sister with valve disease  2021     History diverticulosis partial colectomy and cystectomy for fistulas. Occasional left lower quadrant discomfort. No blood in the stool or vomiting. Last colonoscopy 2013. No GE reflux. No constipation.     Gout well-controlled with allopurinol. Last flare 94.     Recent elevation of PSA now down to 1.6  Flomax.  Prostatism.  No longer in following with urologist.  Terrall Frames about switching from a max to another medication. Some difficulty with ejaculation. Flomax has been helpful for urine flow    Tiny 2021 Dr. 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 Facet osteoarthritis atherosclerotic calcification of the aorta            Specialists    Derm,     [facility]    Urology no longer       PMH:   tonsillecomtoy   hernia repair left   fatty tumor forearm  Sigmoid colectomy and blader repair. Partial cystectomy  Ventral hernia repair with mesh  Melanoma      FH: - melanoma,colon, prostate cancer          + heart disease MI, valve disease.      SH : History tobacco. Positive alcohol. No recreational drugs. Retired.    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  No falls or confusion. Positive rhinitis no wheezing shortness of breath hemoptysis. Follows with pulmonary. No palpitations lower extremity edema exercise intolerance. No TIA or claudication. No GE reflux bloody stools constipation or recurrent diverticular disease. Positive prostatism nocturia greater than 2. Rare knee pain. Dermatologist yearly. Not [employer] on sunscreen use. Grieving appropriately. Constitutional, ent, CV, respiratory, GI, , joint, skin, allergic and psychiatric ROS reviewed and negative except for above    Allergies   Allergen Reactions    Guaifenesin & Derivatives      Nervous.  Metamucil [Psyllium]      Chest pain.     Norflex Tablets [Orphenadrine] Urination issues. Outpatient Medications Marked as Taking for the 3/1/22 encounter (Office Visit) with Bernardo Cameron MD   Medication Sig Dispense Refill    tadalafil (CIALIS) 5 MG tablet Take 1 tablet by mouth daily 30 tablet 0    lisinopril-hydroCHLOROthiazide (PRINZIDE;ZESTORETIC) 20-12.5 MG per tablet TAKE TWO TABLETS BY MOUTH DAILY 180 tablet 1    allopurinol (ZYLOPRIM) 300 MG tablet TAKE ONE TABLET BY MOUTH DAILY 90 tablet 3    ezetimibe-simvastatin (VYTORIN) 10-40 MG per tablet TAKE ONE TABLET BY MOUTH DAILY 90 tablet 1    tamsulosin (FLOMAX) 0.4 MG capsule TAKE ONE CAPSULE BY MOUTH DAILY 90 capsule 1    azelastine (ASTELIN) 0.1 % nasal spray 1 spray by Nasal route 2 times daily Use in each nostril as directed 2 Bottle 1    betamethasone dipropionate 0.05 % lotion APPLY TOPICALLY TWICE DAILY 60 mL 2    Omega-3 Fatty Acids (FISH OIL) 1000 MG CAPS Take 1,000 mg by mouth 2 times daily               Past Medical History:   Diagnosis Date    Abnormal breath sounds 12/3/2019    Aortic stenosis, mild 03/27/2018    Echo 3/30/18 - mild  with preserved EF    Calculus of gallbladder without cholecystitis without obstruction 12/27/2019    Colitis     Dermatitis 6/24/2019    Diverticulitis     Diverticulosis     Elevated liver enzymes 11/19/2018    Essential hypertension, benign     Gout     Hyperlipidemia     Malignant melanoma of neck (HCC)     Obesity, Class I, BMI 30.0-34.9 (see actual BMI) 6/24/2019    Prostatism 6/24/2019    Follows with urology q 6 month psa.  5.2019 1.6     Pulmonary nodule 12/27/2019    Needs CT 6.2019     PVC's (premature ventricular contractions)        Past Surgical History:   Procedure Laterality Date    BLADDER REPAIR  7/23/1998    partial cystectomy    COLECTOMY      COLONOSCOPY      HERNIA REPAIR  1969, 1999, 2005    SKIN CANCER EXCISION  9/30/2013    TONSILLECTOMY  1947    TUMOR REMOVAL  1970    right forearm             Family History   Problem Relation Age of Onset    Heart Failure Father 66    Stroke Mother 80    Other Sister 21        mastoids    Heart Failure Brother     Heart Failure Brother 68        older brother   Marty Sharp Sister 68        oldest sister/breast/brain    Cancer Sister 76           Objective     BP (!) 148/69   Pulse 56   Resp 12   Ht 5' 7\" (1.702 m)   Wt 191 lb (86.6 kg)   SpO2 98% Comment: RA  BMI 29.91 kg/m² Trigeminy    @LASTSAO2(3)@    Wt Readings from Last 3 Encounters:   02/23/22 193 lb 6.4 oz (87.7 kg)   08/24/21 193 lb (87.5 kg)   08/12/21 195 lb 6.4 oz (88.6 kg)       Physical Exam     NAD alert and cooperative  HEENT: Throat is clear. Fair dentition. No carotid hypopharynx. TMs unremarkable pink conjunctive a. Rales left base. No wheeze or rhonchi. Cardiovascular exam irregular beat. Not irregularly irregular. 3 out of 6 murmur heard throughout the precordium. Abdomen obese without hepatosplenomegaly epigastric tenderness or mass. Diminished pulse right lower extremity. Sensation is intact. No maceration. Multiple skin biopsy areas healing. He is appropriate well-groomed. Good eye contact.     Chemistry        Component Value Date/Time     08/24/2021 0955    K 4.0 08/24/2021 0955     08/24/2021 0955    CO2 24 08/24/2021 0955    BUN 22 (H) 08/24/2021 0955    CREATININE 1.2 08/24/2021 0955        Component Value Date/Time    CALCIUM 9.6 08/24/2021 0955    ALKPHOS 94 08/24/2021 0955    AST 26 08/24/2021 0955    ALT 27 08/24/2021 0955    BILITOT 0.7 08/24/2021 0955            Lab Results   Component Value Date    WBC 8.7 06/29/2020    HGB 14.8 06/29/2020    HCT 45.0 06/29/2020    MCV 96.1 06/29/2020     06/29/2020     Lab Results   Component Value Date    LABA1C 5.6 08/24/2021     Lab Results   Component Value Date    .0 08/24/2021     Lab Results   Component Value Date    LABA1C 5.6 08/24/2021     No components found for: CHLPL  Lab Results   Component Value Date    TRIG 100 08/24/2021    TRIG 139 06/29/2020    TRIG 110 12/17/2019     Lab Results   Component Value Date    HDL 47 08/24/2021    HDL 38 (L) 06/29/2020    HDL 40 12/17/2019     Lab Results   Component Value Date    LDLCALC 67 08/24/2021    LDLCALC 55 06/29/2020    LDLCALC 58 12/17/2019     Lab Results   Component Value Date    LABVLDL 20 08/24/2021    LABVLDL 28 06/29/2020    LABVLDL 22 12/17/2019       Old labs and records reviewed or requested  Discussed past lab and studies with patient      Diagnosis Orders   1. Essential hypertension, benign  Chyrl Holter, MD, Cardiology, [facility]    Basic Metabolic Panel    lisinopril-hydroCHLOROthiazide (PRINZIDE;ZESTORETIC) 20-12.5 MG per tablet   2. Malignant melanoma of neck (HCC)     3. Calculus of gallbladder without cholecystitis without obstruction     4. Aortic stenosis, mild  Ivonne Guidry MD, Cardiology, [facility]   5. Gout of foot, unspecified cause, unspecified chronicity, unspecified laterality  Uric Acid    allopurinol (ZYLOPRIM) 300 MG tablet   6. Pure hypercholesterolemia  Chyrl Holter, MD, Cardiology, [facility]   7. Prostatism  PSA, Total and Free   8. Obesity, Class I, BMI 30.0-34.9 (see actual BMI)     9. Palpitations  EKG 12 Lead   10. Laurel Lny MD, Cardiology, [facility]    Magnesium     Pretension good control aortic stenosis now with trigeminy. Some shortness of breath with exertion in the past but stable. Occasional alcohol use. Will follow up with cardiology secondary to new trigeminy and aortic stenosis. Was found to only be mild 2 years ago. Denies any syncope or lower extremity edema. History of melena lymphoma. Follows with dermatology. Asymptomatic gallbladder. History of gout uric acid level. Prostatism with light and ejaculation. Wishes to switch to Cialis trial if not too expensive. Obesity continue to lose weight praise given.     Hyperlipidemia good control on current medications last check. On this date I have spent 40 minutes reviewing previous notes, test results, and face to face with the patient discussing the diagnosis and plan          Return in about 6 months (around 9/1/2022). Diagnosis and treatment discussed.   Possible side effects of medication reviewed  Patients questions answered  Follow up understood  Pt aware if they are not contacted about any test results , this does not mean they are normal.  They should call

## 2022-03-04 LAB
PROSTATE SPECIFIC ANTIGEN FREE: 1.2 UG/L
PROSTATE SPECIFIC ANTIGEN PERCENT FREE: 50 %
PROSTATE SPECIFIC ANTIGEN: 2.4 UG/L (ref 0–4)

## 2022-03-29 ENCOUNTER — OFFICE VISIT (OUTPATIENT)
Dept: CARDIOLOGY CLINIC | Age: 82
End: 2022-03-29
Payer: COMMERCIAL

## 2022-03-29 VITALS
HEART RATE: 80 BPM | DIASTOLIC BLOOD PRESSURE: 70 MMHG | BODY MASS INDEX: 30.32 KG/M2 | SYSTOLIC BLOOD PRESSURE: 124 MMHG | WEIGHT: 193.2 LBS | HEIGHT: 67 IN | OXYGEN SATURATION: 99 %

## 2022-03-29 DIAGNOSIS — I35.0 AORTIC STENOSIS, MILD: Primary | ICD-10-CM

## 2022-03-29 DIAGNOSIS — E78.5 HYPERLIPIDEMIA LDL GOAL <130: ICD-10-CM

## 2022-03-29 DIAGNOSIS — I77.810 ASCENDING AORTA DILATION (HCC): ICD-10-CM

## 2022-03-29 DIAGNOSIS — R00.2 PALPITATIONS: ICD-10-CM

## 2022-03-29 DIAGNOSIS — I10 ESSENTIAL HYPERTENSION: ICD-10-CM

## 2022-03-29 PROCEDURE — G8484 FLU IMMUNIZE NO ADMIN: HCPCS | Performed by: INTERNAL MEDICINE

## 2022-03-29 PROCEDURE — G8417 CALC BMI ABV UP PARAM F/U: HCPCS | Performed by: INTERNAL MEDICINE

## 2022-03-29 PROCEDURE — 99204 OFFICE O/P NEW MOD 45 MIN: CPT | Performed by: INTERNAL MEDICINE

## 2022-03-29 PROCEDURE — 93000 ELECTROCARDIOGRAM COMPLETE: CPT | Performed by: INTERNAL MEDICINE

## 2022-03-29 PROCEDURE — G8427 DOCREV CUR MEDS BY ELIG CLIN: HCPCS | Performed by: INTERNAL MEDICINE

## 2022-03-29 NOTE — PROGRESS NOTES
Pulmonary nodule 2019    Needs CT .     PVC's (premature ventricular contractions)      Past Surgical History:   Procedure Laterality Date    BLADDER REPAIR  1998    partial cystectomy    COLECTOMY      COLONOSCOPY      HERNIA REPAIR  , ,     SKIN CANCER EXCISION  2013    TONSILLECTOMY  1947    TUMOR REMOVAL  1970    right forearm     Family History   Problem Relation Age of Onset    Heart Failure Father 66    Stroke Mother 80    Other Sister 21        mastoids    Heart Failure Brother     Heart Failure Brother 68        older brother   Garland Calvo Sister 68        oldest sister/breast/brain    Cancer Sister 76   His father has a history of heart attack and passed in his [de-identified] and he was a smoker. Esperanza Ford was a previous smoker and quit in . He believes his brother passed from a heart attack in his 62s and was also a smoker. Social History     Tobacco Use    Smoking status: Former Smoker     Packs/day: 1.00     Years: 18.00     Pack years: 18.00     Types: Cigarettes     Quit date:      Years since quittin.2    Smokeless tobacco: Never Used   Vaping Use    Vaping Use: Never used   Substance Use Topics    Alcohol use: Yes     Alcohol/week: 3.0 - 4.0 standard drinks     Types: 3 - 4 Standard drinks or equivalent per week     Comment: every other day    Drug use: No       Allergies   Allergen Reactions    Guaifenesin & Derivatives      Nervous.  Metamucil [Psyllium]      Chest pain.  Norflex Tablets [Orphenadrine]      Urination issues.      Current Outpatient Medications   Medication Sig Dispense Refill    tadalafil (CIALIS) 5 MG tablet Take 1 tablet by mouth daily 30 tablet 0    lisinopril-hydroCHLOROthiazide (PRINZIDE;ZESTORETIC) 20-12.5 MG per tablet TAKE TWO TABLETS BY MOUTH DAILY 180 tablet 1    allopurinol (ZYLOPRIM) 300 MG tablet TAKE ONE TABLET BY MOUTH DAILY 90 tablet 3    ezetimibe-simvastatin (VYTORIN) 10-40 MG per tablet TAKE ONE TABLET BY MOUTH DAILY 90 tablet 1    hydrocortisone (LOCOID) 0.1 % OINT oitment APPLY UP TO TWICE A DAY AS NEEDED FOR RASH 45 g 0    tamsulosin (FLOMAX) 0.4 MG capsule TAKE ONE CAPSULE BY MOUTH DAILY 90 capsule 1    hydrocortisone (ANUSOL-HC) 2.5 % CREA rectal cream APPLY TO AFFECTED AREA(S) TWO TIMES A DAY 28 g 5    azelastine (ASTELIN) 0.1 % nasal spray 1 spray by Nasal route 2 times daily Use in each nostril as directed 2 Bottle 1    betamethasone dipropionate 0.05 % lotion APPLY TOPICALLY TWICE DAILY 60 mL 2    Omega-3 Fatty Acids (FISH OIL) 1000 MG CAPS Take 1,000 mg by mouth 2 times daily       No current facility-administered medications for this visit. Physical Exam:   /70   Pulse 80   Ht 5' 7\" (1.702 m)   Wt 193 lb 3.2 oz (87.6 kg)   SpO2 99%   BMI 30.26 kg/m²   No intake or output data in the 24 hours ending 03/29/22 1553  Wt Readings from Last 2 Encounters:   03/29/22 193 lb 3.2 oz (87.6 kg)   03/01/22 191 lb (86.6 kg)     Constitutional: He is oriented to person, place, and time. He appears well-developed and well-nourished. In no acute distress. Head: Normocephalic and atraumatic. Neck: Neck supple. No JVD present. Carotid bruit is not present. No mass and no thyromegaly present. No lymphadenopathy present. Cardiovascular: Normal rate, regular rhythm, normal heart sounds and intact distal pulses. Exam reveals no gallop and no friction rub. No murmur heard. Pulmonary/Chest: Effort normal and breath sounds normal. No respiratory distress. He has no wheezes, rhonchi or rales. Abdominal: Soft, non-tender. Bowel sounds and aorta are normal. He exhibits no organomegaly, mass or bruit. Extremities: No edema, cyanosis, or clubbing. Pulses are 2+ radial/carotid/dorsalis pedis and posterior tibial bilaterally. Neurological: He is alert and oriented to person, place, and time. He has normal reflexes. No cranial nerve deficit. Coordination normal.   Skin: Skin is warm and dry.  There is no rash or diaphoresis. Psychiatric: He has a normal mood and affect. His speech is normal and behavior is normal.     Personally reviewed and interpreted   EKG Interpretation 3/29/22: Normal sinus rhythm with normal axis and intervals      Imaging:     Echo 4/23/15 ([facility])   - Left ventricle: The cavity size was normal. Wall thickness was     normal. Systolic function was normal. The estimated ejection     fraction was in the range of 50% to 55%. Wall motion was normal;     there were no regional wall motion abnormalities. Features are     consistent with a pseudonormal left ventricular filling pattern,     with concomitant abnormal relaxation and increased filling     pressure (grade 2 diastolic dysfunction). Aortic valve:     Transvalvular velocity was minimally increased. There was mild     stenosis. Trivial regurgitation. Valve area: 1.88cm^2(VTI). Left     atrium: The atrium was mildly to moderately dilated. Echo 3/30/18  Normal left ventricle size and systolic function with an estimated ejection  fraction of 60-65%. No regional wall motion abnormalities are seen. There is  mildly increased left ventricular wall thickness. The right ventricle is normal in size and function. Mild aortic stenosis with a peak velocity of 2.8 m/s and a mean pressure  gradient of 18 mmHg. Trivial aortic, mitral, and tricuspid regurgitation. Echo 6/10/20  Left ventricular cavity size is normal with mild concentric left ventricular hypertrophy. Ejection fraction is visually estimated to be 60%. Grade II diastolic dysfunction with elevated LV filling pressures. Calcification of the mitral valve noted. Trivial mitral regurgitation is present. No evidence of mitral stenosis. The left atrium is mild to moderate dilated. Mild aortic stenosis with a peak velocity of 330m/s and a mean pressure  gradient of 15mmHg. Trivial aortic regurgitation.   The ascending aorta is mildly dilated & measures at 4.2 cms  The aortic root is normal in size. Chest CT 7/22/21  Mediastinum: The heart is enlarged.  There is moderate atherosclerotic   calcification of the coronary arteries and of the aorta. Lab Review:   Lab Results   Component Value Date    TRIG 100 08/24/2021    HDL 47 08/24/2021    LDLCALC 67 08/24/2021    LABVLDL 20 08/24/2021     Lab Results   Component Value Date     03/01/2022    K 4.2 03/01/2022    BUN 35 03/01/2022    CREATININE 1.3 03/01/2022         Assessment:  1. Aortic stenosis, mild   2. Essential hypertension  3. Hyperlipidemia with LDL goal <130 mg/dL  4. Palpitations  5. Ascending Aorta Dilatation    Plan:  The aortic stenosis noted on his prior echocardiogram continues to be mild for several years and he is asymptomatic. I would not pursue any further testing for this. However, his ascending aorta was seen to be dilated on his echo and I will have him complete a chest CT with contrast to assess for aneurysm. His blood pressure is well controlled today in the office. I have encouraged him to participate in aerobic exercise and adhere to a heart healthy diet. I have personally reviewed all previous testing for this visit today including imaging, lab results and EKG as detailed above. I will see him in the office for follow up in 1 year. This note was scribed in the presence of Marian Gonzalez MD by [employer], RN. Physician Attestation:  The scribes documentation has been prepared under my direction and personally reviewed by me in its entirety. I, Dr. Yani Lucero personally performed the services described in this documentation as scribed by my RN in my presence, and I confirm that the note above accurately reflects all work, treatment, procedures, and medical decision making performed by me.

## 2022-04-05 ENCOUNTER — HOSPITAL ENCOUNTER (OUTPATIENT)
Dept: CT IMAGING | Age: 82
Discharge: HOME | End: 2022-04-05
Payer: COMMERCIAL

## 2022-04-05 DIAGNOSIS — I77.810 ASCENDING AORTA DILATION (HCC): ICD-10-CM

## 2022-04-05 PROCEDURE — 6360000004 HC RX CONTRAST MEDICATION: Performed by: INTERNAL MEDICINE

## 2022-04-05 PROCEDURE — 71260 CT THORAX DX C+: CPT

## 2022-04-05 RX ADMIN — IOPAMIDOL 75 ML: 755 INJECTION, SOLUTION INTRAVENOUS at 08:33

## 2022-04-07 DIAGNOSIS — R35.1 NOCTURIA: ICD-10-CM

## 2022-04-07 NOTE — TELEPHONE ENCOUNTER
Medication:   Requested Prescriptions     Pending Prescriptions Disp Refills    tamsulosin (FLOMAX) 0.4 MG capsule [Pharmacy Med Name: TAMSULOSIN HCL 0.4 MG CAPSULE] 90 capsule 1     Sig: TAKE ONE CAPSULE BY MOUTH DAILY       Last Filled:      Patient Phone Number: [phone] (home)     Last appt: 3/1/2022   Next appt: 9/1/2022

## 2022-04-08 RX ORDER — TAMSULOSIN HYDROCHLORIDE 0.4 MG/1
CAPSULE ORAL
Qty: 90 CAPSULE | Refills: 1 | Status: SHIPPED | OUTPATIENT
Start: 2022-04-08 | End: 2022-10-04 | Stop reason: SDUPTHER

## 2022-05-16 ENCOUNTER — TELEPHONE (OUTPATIENT)
Dept: FAMILY MEDICINE CLINIC | Age: 82
End: 2022-05-16

## 2022-05-16 ENCOUNTER — TELEMEDICINE (OUTPATIENT)
Dept: FAMILY MEDICINE CLINIC | Age: 82
End: 2022-05-16
Payer: COMMERCIAL

## 2022-05-16 DIAGNOSIS — J40 BRONCHITIS: Primary | ICD-10-CM

## 2022-05-16 PROCEDURE — G8427 DOCREV CUR MEDS BY ELIG CLIN: HCPCS | Performed by: INTERNAL MEDICINE

## 2022-05-16 PROCEDURE — 1123F ACP DISCUSS/DSCN MKR DOCD: CPT | Performed by: INTERNAL MEDICINE

## 2022-05-16 PROCEDURE — 99213 OFFICE O/P EST LOW 20 MIN: CPT | Performed by: INTERNAL MEDICINE

## 2022-05-16 PROCEDURE — 4040F PNEUMOC VAC/ADMIN/RCVD: CPT | Performed by: INTERNAL MEDICINE

## 2022-05-16 RX ORDER — PREDNISONE 10 MG/1
10 TABLET ORAL 2 TIMES DAILY
Qty: 10 TABLET | Refills: 0 | Status: SHIPPED | OUTPATIENT
Start: 2022-05-16 | End: 2022-05-21

## 2022-05-16 RX ORDER — AZITHROMYCIN 250 MG/1
250 TABLET, FILM COATED ORAL SEE ADMIN INSTRUCTIONS
Qty: 6 TABLET | Refills: 0 | Status: SHIPPED | OUTPATIENT
Start: 2022-05-16 | End: 2022-05-21

## 2022-05-16 RX ORDER — AZELASTINE 1 MG/ML
SPRAY, METERED NASAL
Qty: 1 EACH | Refills: 0 | Status: SHIPPED | OUTPATIENT
Start: 2022-05-16

## 2022-05-16 ASSESSMENT — PATIENT HEALTH QUESTIONNAIRE - PHQ9
SUM OF ALL RESPONSES TO PHQ QUESTIONS 1-9: 0
1. LITTLE INTEREST OR PLEASURE IN DOING THINGS: 0
2. FEELING DOWN, DEPRESSED OR HOPELESS: 0
SUM OF ALL RESPONSES TO PHQ QUESTIONS 1-9: 0
SUM OF ALL RESPONSES TO PHQ9 QUESTIONS 1 & 2: 0

## 2022-05-16 NOTE — PROGRESS NOTES
NOTE - this visit conducted via telephone  Visit initiated at patient or caregiver request with permission to bill to insurer granted. Consent:  He and/or health care decision maker is aware that that he may receive a bill for this telephone service, depending on his insurance coverage, and has provided verbal consent to proceed: Yes      Documentation:  I communicated with the patient and/or health care decision maker about see below. Details of this discussion including any medical advice provided: see below      I affirm this is a Patient Initiated Episode with a Patient who has not had a related appointment within my department in the past 7 days or scheduled within the next 24 hours. Patient identification was verified at the start of the visit: YesTotal Time: minutes: 5-10 minutes    Note: not billable if this call serves to triage the patient into an appointment for the relevant concern      HPI: Lyndsey Castro presents for evaluation and management of bronchitis and wheeze        chronic health issues include history neck  melanoma, partial colectomy and cystectomy for diverticulitis and fissure, assymptomatic aortic stenosis/AI and calcification of mitral valve, colonic polyps, hyperlipidemia, prostatism,  stable pulm nodule, hypertension, A1c 6.4, BMI 35, cholelithiasis,. Pulmonary nodules. History of bronchitis. Pulmonary functions at the [facility]. CT abdomen and chest with calcifications and 8 mm nodule right lower lobe. Positive asbestos exposure. Has used steroids in the past but no inhalers. Wife had URI symptomatology last week. Sore throat and was given antibiotics for pharyngitis. 1 week ago he had the onset of postnasal drip. Use Coricidin HP. No sore throat chest congestion wheeze at nighttime. No vomiting or diarrhea. Has not had COVID testing. Partners now well. He is out of his Astelin.   History of chronic rhinitis           Wife  2021 CLL, atypical Mycobacterium and cirrhosis. Remarried and happy    Resolved thigh cramping after using electrolyte water.  Nocturia every 2 hours at nighttime.  Flomax. Ollis Inez elevated prostate doing well.      CT abdomen and chest evaluation diverticulitis revealed calcifications with 8 mm nodule right lower lobe.  Has been exposed asbestos.  History of tobacco.  Denies any shortness of breath or sputum production.  History of bronchitis intermittently.    Yearly CT.  Pulmonary findings stable last CT [facility] 0353. Dr. Alexandra Luz pulmonary      Mildly elevated liver function tests with negative hepatitis C, TIBC.   History of alcohol and obesity.  Resolved prediabetes with A1c down to 5.2021.  Last liver functions normal .  Lost over 30 pounds with diet and exercise     Asymptomatic gallstones.  Has seen surgeon.  Aware of symptoms   .  History of prediabetes. Last check normal.  Weight is down. Active but no formal exercise      Hx melanoma.  Yearly follow-up. 9.4175 with 2 mole excision neg  pathology recently.      Hypertension. Echocardiogram 2018 with trivial aortic stenosis/regurgitation mitral and tricuspid regurgitation. Ejection fraction 60-65%. Mild increased left ventricular wall thickness. No syncope, exercise intolerance or edema. No TIA-like symptoms.  . Reports stress thallium normal in .   Denies syncope or presyncope is on lisinopril hydrochlorothiazide.  Vytorin.  LDL 67.  Sister with valve disease  2021     History diverticulosis partial colectomy and cystectomy for fistulas. Occasional left lower quadrant discomfort. No blood in the stool or vomiting. Last colonoscopy 2013. No GE reflux. No constipation.     Gout well-controlled with allopurinol. Last flare 94.     Recent elevation of PSA now down to 1.6  Flomax.  Prostatism.  No longer in following with urologist.  Janell Briscoe about switching from a max to another medication. Some difficulty with ejaculation.   Flomax has been helpful tablet 1    allopurinol (ZYLOPRIM) 300 MG tablet TAKE ONE TABLET BY MOUTH DAILY 90 tablet 3    ezetimibe-simvastatin (VYTORIN) 10-40 MG per tablet TAKE ONE TABLET BY MOUTH DAILY 90 tablet 1    betamethasone dipropionate 0.05 % lotion APPLY TOPICALLY TWICE DAILY 60 mL 2    Omega-3 Fatty Acids (FISH OIL) 1000 MG CAPS Take 1,000 mg by mouth 2 times daily               Past Medical History:   Diagnosis Date    Abnormal breath sounds 12/3/2019    Aortic stenosis, mild 03/27/2018    Echo 3/30/18 - mild  with preserved EF    Calculus of gallbladder without cholecystitis without obstruction 12/27/2019    Colitis     Dermatitis 6/24/2019    Diverticulitis     Diverticulosis     Elevated liver enzymes 11/19/2018    Essential hypertension, benign     Gout     Hyperlipidemia     Malignant melanoma of neck (HCC)     Obesity, Class I, BMI 30.0-34.9 (see actual BMI) 6/24/2019    Prostatism 6/24/2019    Follows with urology q 6 month psa.  5.2019 1.6     Pulmonary nodule 12/27/2019    Needs CT 6.2019     PVC's (premature ventricular contractions)        Past Surgical History:   Procedure Laterality Date    BLADDER REPAIR  7/23/1998    partial cystectomy    COLECTOMY      COLONOSCOPY      HERNIA REPAIR  1969, 1999, 2005    SKIN CANCER EXCISION  9/30/2013    TONSILLECTOMY  1947    TUMOR REMOVAL  1970    right forearm             Family History   Problem Relation Age of Onset    Heart Failure Father 66    Stroke Mother 80    Other Sister 21        mastoids    Heart Failure Brother     Heart Failure Brother 68        older brother    Cancer Sister 68        oldest sister/breast/brain    Cancer Sister 76         Review of Systems      Chemistry        Component Value Date/Time     03/01/2022 0918    K 4.2 03/01/2022 0918     03/01/2022 0918    CO2 23 03/01/2022 0918    BUN 35 (H) 03/01/2022 0918    CREATININE 1.3 03/01/2022 0918        Component Value Date/Time    CALCIUM 9.5 03/01/2022 0918 ALKPHOS 94 08/24/2021 0955    AST 26 08/24/2021 0955    ALT 27 08/24/2021 0955    BILITOT 0.7 08/24/2021 0955            NO  VS as this was a virtual visit during [email or web address] pandemic    Lab Results   Component Value Date    WBC 8.7 06/29/2020    HGB 14.8 06/29/2020    HCT 45.0 06/29/2020    MCV 96.1 06/29/2020     06/29/2020     Lab Results   Component Value Date    LABA1C 5.6 08/24/2021     Lab Results   Component Value Date    .0 08/24/2021     Lab Results   Component Value Date    LABA1C 5.6 08/24/2021     No components found for: CHLPL  Lab Results   Component Value Date    TRIG 100 08/24/2021    TRIG 139 06/29/2020    TRIG 110 12/17/2019     Lab Results   Component Value Date    HDL 47 08/24/2021    HDL 38 (L) 06/29/2020    HDL 40 12/17/2019     Lab Results   Component Value Date    LDLCALC 67 08/24/2021    LDLCALC 55 06/29/2020    LDLCALC 58 12/17/2019     Lab Results   Component Value Date    LABVLDL 20 08/24/2021    LABVLDL 28 06/29/2020    LABVLDL 22 12/17/2019       Old labs and records reviewed or requested  Discussed past lab and studies with patient        Diagnosis Orders   1. Bronchitis  azithromycin (ZITHROMAX) 250 MG tablet       Bronchitis Zithromax prednisone discussed getting COVID testing. We did not discuss pulmonary nodule, prostatism, prediabetes, obesity, history of melanoma, hyperlipidemia, gout, HTn today        No follow-ups on file. Diagnosis and treatment discussed.   Possible side effects of medication reviewed  Patients questions answered  Follow up understood  Pt aware if they are not contacted about any test results , this does not mean they are normal.  They should call

## 2022-05-16 NOTE — TELEPHONE ENCOUNTER
AXEL--Anahi with [employer] [place] calling regarding nasal spray. Max dose 4 sprays which would be 2 sprays BID. Gave the ok to change.

## 2022-06-01 ENCOUNTER — TELEPHONE (OUTPATIENT)
Dept: PULMONOLOGY | Age: 82
End: 2022-06-01

## 2022-06-01 NOTE — TELEPHONE ENCOUNTER
Patient had CT ordered with contrast by Dr. Emile Amaro and was performed.     Is that sufficient enough as opposed to the w/o contrast for upcoming appointment

## 2022-06-13 ENCOUNTER — TELEPHONE (OUTPATIENT)
Dept: FAMILY MEDICINE CLINIC | Age: 82
End: 2022-06-13

## 2022-06-13 RX ORDER — EPINEPHRINE 0.3 MG/.3ML
INJECTION SUBCUTANEOUS
Qty: 1 EACH | Refills: 0 | Status: SHIPPED | OUTPATIENT
Start: 2022-06-13

## 2022-06-13 NOTE — TELEPHONE ENCOUNTER
Pt wants to know if he can get a script for an epi pen like for getting stung by a wasp or bee. Pt was stung a few years ago and he ended up at an urgent. Pt is worried because the bees and wasps are all around them this year. Pt is reachable at [phone] if any questions. [facility]

## 2022-07-21 ENCOUNTER — OFFICE VISIT (OUTPATIENT)
Dept: PULMONOLOGY | Age: 82
End: 2022-07-21
Payer: COMMERCIAL

## 2022-07-21 VITALS
HEART RATE: 46 BPM | SYSTOLIC BLOOD PRESSURE: 124 MMHG | OXYGEN SATURATION: 96 % | WEIGHT: 193.3 LBS | TEMPERATURE: 97.3 F | DIASTOLIC BLOOD PRESSURE: 60 MMHG | HEIGHT: 67 IN | BODY MASS INDEX: 30.34 KG/M2 | RESPIRATION RATE: 16 BRPM

## 2022-07-21 DIAGNOSIS — R91.8 PULMONARY NODULES: Primary | ICD-10-CM

## 2022-07-21 DIAGNOSIS — R93.89 ABNORMAL CT OF THE CHEST: ICD-10-CM

## 2022-07-21 DIAGNOSIS — J92.0 PLEURAL PLAQUE DUE TO ASBESTOS EXPOSURE: ICD-10-CM

## 2022-07-21 PROCEDURE — 99214 OFFICE O/P EST MOD 30 MIN: CPT | Performed by: INTERNAL MEDICINE

## 2022-07-21 PROCEDURE — G8417 CALC BMI ABV UP PARAM F/U: HCPCS | Performed by: INTERNAL MEDICINE

## 2022-07-21 PROCEDURE — 1036F TOBACCO NON-USER: CPT | Performed by: INTERNAL MEDICINE

## 2022-07-21 PROCEDURE — G8427 DOCREV CUR MEDS BY ELIG CLIN: HCPCS | Performed by: INTERNAL MEDICINE

## 2022-07-21 PROCEDURE — 1123F ACP DISCUSS/DSCN MKR DOCD: CPT | Performed by: INTERNAL MEDICINE

## 2022-07-21 NOTE — PROGRESS NOTES
Chief complaint  This is a 80y.o. year old male  who comes to see me with a chief complaint of   Chief Complaint   Patient presents with    Follow-up       HPI  Here with a cc of lung nodule, abnormal CT chest     No new issues. Had CT chest in April after seeing Dr. Carol Kruger. He ordered CT to monitor his aneurysm. He says his breathing is doing ok. No changes. HR was noted to be slow but he has no complaints related to that.   Did pursue a claim with government related to previous asbestos exposure etc.          Current Outpatient Medications:     EPINEPHrine (EPIPEN 2-BESS) 0.3 MG/0.3ML SOAJ injection, Use as directed for allergic reaction, Disp: 1 each, Rfl: 0    azelastine (ASTELIN) 0.1 % nasal spray, 2 puffs to nose tid as needed, Disp: 1 each, Rfl: 0    tamsulosin (FLOMAX) 0.4 MG capsule, TAKE ONE CAPSULE BY MOUTH DAILY, Disp: 90 capsule, Rfl: 1    tadalafil (CIALIS) 5 MG tablet, Take 1 tablet by mouth daily, Disp: 30 tablet, Rfl: 0    lisinopril-hydroCHLOROthiazide (PRINZIDE;ZESTORETIC) 20-12.5 MG per tablet, TAKE TWO TABLETS BY MOUTH DAILY, Disp: 180 tablet, Rfl: 1    allopurinol (ZYLOPRIM) 300 MG tablet, TAKE ONE TABLET BY MOUTH DAILY, Disp: 90 tablet, Rfl: 3    ezetimibe-simvastatin (VYTORIN) 10-40 MG per tablet, TAKE ONE TABLET BY MOUTH DAILY, Disp: 90 tablet, Rfl: 1    hydrocortisone (LOCOID) 0.1 % OINT oitment, APPLY UP TO TWICE A DAY AS NEEDED FOR RASH, Disp: 45 g, Rfl: 0    hydrocortisone (ANUSOL-HC) 2.5 % CREA rectal cream, APPLY TO AFFECTED AREA(S) TWO TIMES A DAY, Disp: 28 g, Rfl: 5    betamethasone dipropionate 0.05 % lotion, APPLY TOPICALLY TWICE DAILY, Disp: 60 mL, Rfl: 2    Omega-3 Fatty Acids (FISH OIL) 1000 MG CAPS, Take 1,000 mg by mouth 2 times daily, Disp: , Rfl:       PHYSICAL EXAM:  Vitals:    07/21/22 0933   BP: 124/60   Pulse: (!) 46   Resp: 16   Temp: 97.3 °F (36.3 °C)   SpO2: 96%       GENERAL:  Well nourished, alert, appears stated age, no distress  HEENT:  No scleral icterus, no conjunctival irritation  NECK:  No thyromegaly, no bruits  LYMPH:  No cervical or supraclavicular adenopathy  HEART:  Regular rate and rhythm, + murmur  LUNGS:  Faint crackles in bases, clear otherwise   ABDOMEN:  No distention, no organomegaly  EXTREMITIES:  No edema, no digital clubbing  NEURO:  No localizing deficits, CN II-XII intact    Pulmonary Function Testing 8/2020  Mild reduction in DLCO    Chest imaging from 4/22 is reviewed. My interpretation is pleural plaques, calcifications, scarring, reticular markings, small LLL nodule vs scarring      I reviewed above labs and studies pertinent to this visit and date    Assessment/Plan:  1. Pulmonary nodules  Small and stable on imaging. Suspect related to scarring. Yearly CT scans moving forwards    2. 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 [facility] but did not know his results. I will hold off on spirometry for now. 3. Abnormal CT of the chest  Constellation of findings may support a diagnosis of asbestosis.      Next CT is 4/23    Follow up in 6 months

## 2022-09-01 ENCOUNTER — OFFICE VISIT (OUTPATIENT)
Dept: FAMILY MEDICINE CLINIC | Age: 82
End: 2022-09-01
Payer: COMMERCIAL

## 2022-09-01 VITALS
DIASTOLIC BLOOD PRESSURE: 71 MMHG | HEART RATE: 79 BPM | SYSTOLIC BLOOD PRESSURE: 130 MMHG | BODY MASS INDEX: 29.51 KG/M2 | OXYGEN SATURATION: 97 % | WEIGHT: 188 LBS | RESPIRATION RATE: 16 BRPM | HEIGHT: 67 IN

## 2022-09-01 DIAGNOSIS — I10 ESSENTIAL HYPERTENSION, BENIGN: Primary | ICD-10-CM

## 2022-09-01 DIAGNOSIS — E66.3 OVERWEIGHT WITH BODY MASS INDEX (BMI) 25.0-29.9: ICD-10-CM

## 2022-09-01 DIAGNOSIS — N40.0 PROSTATISM: ICD-10-CM

## 2022-09-01 DIAGNOSIS — R91.1 PULMONARY NODULE: ICD-10-CM

## 2022-09-01 DIAGNOSIS — E78.2 MIXED HYPERLIPIDEMIA: ICD-10-CM

## 2022-09-01 DIAGNOSIS — M10.9 GOUT OF FOOT, UNSPECIFIED CAUSE, UNSPECIFIED CHRONICITY, UNSPECIFIED LATERALITY: ICD-10-CM

## 2022-09-01 DIAGNOSIS — I35.0 AORTIC STENOSIS, MILD: ICD-10-CM

## 2022-09-01 DIAGNOSIS — K80.20 CALCULUS OF GALLBLADDER WITHOUT CHOLECYSTITIS WITHOUT OBSTRUCTION: ICD-10-CM

## 2022-09-01 DIAGNOSIS — R06.89 ABNORMAL BREATH SOUNDS: ICD-10-CM

## 2022-09-01 DIAGNOSIS — E78.00 PURE HYPERCHOLESTEROLEMIA: ICD-10-CM

## 2022-09-01 PROBLEM — R73.03 PREDIABETES: Status: RESOLVED | Noted: 2019-06-23 | Resolved: 2022-09-01

## 2022-09-01 LAB
A/G RATIO: 1.9 (ref 1.1–2.2)
ALBUMIN SERPL-MCNC: 4.3 G/DL (ref 3.4–5)
ALP BLD-CCNC: 59 U/L (ref 40–129)
ALT SERPL-CCNC: 11 U/L (ref 10–40)
ANION GAP SERPL CALCULATED.3IONS-SCNC: 11 MMOL/L (ref 3–16)
AST SERPL-CCNC: 11 U/L (ref 15–37)
BILIRUB SERPL-MCNC: 0.3 MG/DL (ref 0–1)
BUN BLDV-MCNC: 9 MG/DL (ref 7–20)
CALCIUM SERPL-MCNC: 9.6 MG/DL (ref 8.3–10.6)
CHLORIDE BLD-SCNC: 103 MMOL/L (ref 99–110)
CHOLESTEROL, FASTING: 210 MG/DL (ref 0–199)
CO2: 25 MMOL/L (ref 21–32)
CREAT SERPL-MCNC: 0.6 MG/DL (ref 0.8–1.3)
GFR AFRICAN AMERICAN: >60
GFR NON-AFRICAN AMERICAN: >60
GLUCOSE BLD-MCNC: 99 MG/DL (ref 70–99)
HDLC SERPL-MCNC: 52 MG/DL (ref 40–60)
LDL CHOLESTEROL CALCULATED: 136 MG/DL
POTASSIUM SERPL-SCNC: 4.3 MMOL/L (ref 3.5–5.1)
SODIUM BLD-SCNC: 139 MMOL/L (ref 136–145)
TOTAL PROTEIN: 6.6 G/DL (ref 6.4–8.2)
TRIGLYCERIDE, FASTING: 109 MG/DL (ref 0–150)
URIC ACID, SERUM: 4.8 MG/DL (ref 3.5–7.2)
VLDLC SERPL CALC-MCNC: 22 MG/DL

## 2022-09-01 PROCEDURE — 1036F TOBACCO NON-USER: CPT | Performed by: INTERNAL MEDICINE

## 2022-09-01 PROCEDURE — 99214 OFFICE O/P EST MOD 30 MIN: CPT | Performed by: INTERNAL MEDICINE

## 2022-09-01 PROCEDURE — 36415 COLL VENOUS BLD VENIPUNCTURE: CPT | Performed by: INTERNAL MEDICINE

## 2022-09-01 PROCEDURE — G8427 DOCREV CUR MEDS BY ELIG CLIN: HCPCS | Performed by: INTERNAL MEDICINE

## 2022-09-01 PROCEDURE — G8417 CALC BMI ABV UP PARAM F/U: HCPCS | Performed by: INTERNAL MEDICINE

## 2022-09-01 PROCEDURE — 1123F ACP DISCUSS/DSCN MKR DOCD: CPT | Performed by: INTERNAL MEDICINE

## 2022-09-01 NOTE — PROGRESS NOTES
HPI: Avni Salgado presents for follow-up. chronic health issues include history neck  melanoma, partial colectomy and cystectomy for diverticulitis and fissure, assymptomatic aortic stenosis/AI and calcification of mitral valve, colonic polyps, hyperlipidemia, prostatism,  stable pulm nodule, hypertension, A1c 6.4, BMI 35, cholelithiasis,. Pulmonary nodules. History of bronchitis. Pulmonary functions at the [place]. CT abdomen and chest with calcifications and 8 mm nodule right lower lobe. Positive asbestos exposure. Yearly Ct. Follows with pulm . History of chronic rhinitis        Wife  2021 CLL, atypical Mycobacterium and cirrhosis. Remarried and happy with. Resolved thigh cramping after using electrolyte water. Nocturia every 2 hours at nighttime. Flomax. Mildly elevated prostate doing well. CT abdomen and chest evaluation diverticulitis       Mildly elevated liver function tests with negative hepatitis C, TIBC. History of alcohol and obesity. Resolved prediabetes with A1c down to 5.2021. Last liver functions normal .  Lost nearly 50 pounds with diet and exercise     Asymptomatic gallstones. Has seen surgeon. Aware of symptoms   . History of prediabetes. Last check normal.  Weight is down. 1/2 miles 4 times per  . Hx melanoma. Yearly follow-up. 8.0551 with 2 mole excision neg  pathology recently. Hypertension. Echocardiogram 2018 with trivial aortic stenosis/regurgitation mitral and tricuspid regurgitation. Ejection fraction 60-65%. Mild increased left ventricular wall thickness. No syncope, exercise intolerance or edema. No TIA-like symptoms. . Reports stress thallium normal in 2005. Denies syncope or presyncope is on lisinopril hydrochlorothiazide. Vytorin. LDL 67. Sister with valve disease  2021     History diverticulosis partial colectomy and cystectomy for fistulas. Occasional right lower quadrant discomfort.  No blood in the stool or vomiting. Last colonoscopy August 2013. No GE reflux. No constipation. Gout well-controlled with allopurinol. Last flare 94. Recent elevation of PSA now down to 1.6  Flomax. Prostatism. No longer in following with urologist.  Gabino Wolfe about switching from a max to another medication. Some difficulty with ejaculation. Flomax has been helpful for urine flow     [place] 7/7/2021 Dr. 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 Facet osteoarthritis atherosclerotic calcification of the aorta             Specialists    Derm,     [address] doctor years    Eye    Urology no longer       PMH:   tonsillecomtoy   hernia repair left   fatty tumor forearm  Sigmoid colectomy and blader repair. Partial cystectomy  Ventral hernia repair with mesh  Melanoma      FH: - melanoma,colon, prostate cancer          + heart disease MI, valve disease. SH : History tobacco. Positive alcohol. No recreational drugs. Retired.  2023 remarried      View of systems: Up-to-date eye and dental checks. No falls or confusion. Positive rhinitis no wheezing shortness of breath hemoptysis. Follows with pulmonary. No palpitations lower extremity edema exercise intolerance. No TIA or claudication. No GE reflux bloody stools constipation or recurrent diverticular disease. Positive prostatism nocturia greater than 2. Rare knee pain. Dermatologist yearly. Not Latter day on sunscreen use. Grieving appropriately. Constitutional, ent, CV, respiratory, GI, , joint, skin, allergic and psychiatric ROS reviewed and negative except for above    Allergies   Allergen Reactions    Guaifenesin & Derivatives      Nervous. Metamucil [Psyllium]      Chest pain. Norflex Tablets [Orphenadrine]      Urination issues.        Outpatient Medications Marked as Taking for the 9/1/22 encounter (Office Visit) with Quinten Wan MD   Medication Sig Dispense Refill    EPINEPHrine (EPIPEN 2-BESS) 0.3 MG/0.3ML SOAJ injection Use as directed for allergic reaction 1 each 0    tamsulosin (FLOMAX) 0.4 MG capsule TAKE ONE CAPSULE BY MOUTH DAILY 90 capsule 1    tadalafil (CIALIS) 5 MG tablet Take 1 tablet by mouth daily 30 tablet 0    lisinopril-hydroCHLOROthiazide (PRINZIDE;ZESTORETIC) 20-12.5 MG per tablet TAKE TWO TABLETS BY MOUTH DAILY 180 tablet 1    allopurinol (ZYLOPRIM) 300 MG tablet TAKE ONE TABLET BY MOUTH DAILY 90 tablet 3    ezetimibe-simvastatin (VYTORIN) 10-40 MG per tablet TAKE ONE TABLET BY MOUTH DAILY 90 tablet 1    hydrocortisone (LOCOID) 0.1 % OINT oitment APPLY UP TO TWICE A DAY AS NEEDED FOR RASH 45 g 0    hydrocortisone (ANUSOL-HC) 2.5 % CREA rectal cream APPLY TO AFFECTED AREA(S) TWO TIMES A DAY 28 g 5    betamethasone dipropionate 0.05 % lotion APPLY TOPICALLY TWICE DAILY 60 mL 2    Omega-3 Fatty Acids (FISH OIL) 1000 MG CAPS Take 1,000 mg by mouth 2 times daily               Past Medical History:   Diagnosis Date    Abnormal breath sounds 12/3/2019    Aortic stenosis, mild 03/27/2018    Echo 3/30/18 - mild  with preserved EF    Calculus of gallbladder without cholecystitis without obstruction 12/27/2019    Colitis     Dermatitis 6/24/2019    Diverticulitis     Diverticulosis     Elevated liver enzymes 11/19/2018    Essential hypertension, benign     Gout     Hyperlipidemia     Malignant melanoma of neck (HCC)     Obesity, Class I, BMI 30.0-34.9 (see actual BMI) 6/24/2019    Prostatism 6/24/2019    Follows with urology q 6 month psa.  5.2019 1.6     Pulmonary nodule 12/27/2019    Needs CT 6.2019     PVC's (premature ventricular contractions)        Past Surgical History:   Procedure Laterality Date    BLADDER REPAIR  7/23/1998    partial cystectomy    [facility], 2005    SKIN CANCER EXCISION  9/30/2013    TONSILLECTOMY  1947    TUMOR REMOVAL  1970    right forearm             Family History   Problem Relation Age of Onset    Heart Failure Father 66 Stroke Mother 80    Other Sister 21        mastoids    Heart Failure Brother     Heart Failure Brother 68        older brother    Cancer Sister 68        oldest sister/breast/brain    Cancer Sister 76               Objective     /71   Pulse 79   Resp 16   Ht 5' 7\" (1.702 m)   Wt 188 lb (85.3 kg)   SpO2 97%   BMI 29.44 kg/m²     @LASTSAO2(3)@    Wt Readings from Last 3 Encounters:   07/21/22 193 lb 4.8 oz (87.7 kg)   03/29/22 193 lb 3.2 oz (87.6 kg)   03/01/22 191 lb (86.6 kg)       Physical Exam     NAD alert and cooperative  HEENT: Throat is clear none crowded hypopharynx no oral ulcers. TMs are unremarkable pink conjunctive a. Good upstroke of the carotids. Lungs are decreased breath sounds occasional rhonchi. No basilar rales. Cardiovascular exam holosystolic murmur throughout the precordium. No ectopy. Abdomen is benign no hepatosplenomegaly or point tenderness. Diastases  Good pulses feet sensation is intact none occluded varicosities. No suspicious skin lesions or nodules.   He is well-groomed appropriate good eye contact    Chemistry        Component Value Date/Time     03/01/2022 0918    K 4.2 03/01/2022 0918     03/01/2022 0918    CO2 23 03/01/2022 0918    BUN 35 (H) 03/01/2022 0918    CREATININE 1.3 03/01/2022 0918        Component Value Date/Time    CALCIUM 9.5 03/01/2022 0918    ALKPHOS 94 08/24/2021 0955    AST 26 08/24/2021 0955    ALT 27 08/24/2021 0955    BILITOT 0.7 08/24/2021 0955            Lab Results   Component Value Date    WBC 8.7 06/29/2020    HGB 14.8 06/29/2020    HCT 45.0 06/29/2020    MCV 96.1 06/29/2020     06/29/2020     Lab Results   Component Value Date    LABA1C 5.6 08/24/2021     Lab Results   Component Value Date    .0 08/24/2021     Lab Results   Component Value Date    LABA1C 5.6 08/24/2021     No components found for: CHLPL  Lab Results   Component Value Date    TRIG 100 08/24/2021    TRIG 139 06/29/2020    TRIG 110 12/17/2019 Lab Results   Component Value Date    HDL 47 08/24/2021    HDL 38 (L) 06/29/2020    HDL 40 12/17/2019     Lab Results   Component Value Date    LDLCALC 67 08/24/2021    LDLCALC 55 06/29/2020    LDLCALC 58 12/17/2019     Lab Results   Component Value Date    LABVLDL 20 08/24/2021    LABVLDL 28 06/29/2020    LABVLDL 22 12/17/2019       Old labs and records reviewed or requested  Discussed past lab and studies with patient      Diagnosis Orders   1. Essential hypertension, benign  Comprehensive Metabolic Panel      2. Aortic stenosis, mild        3. Overweight with body mass index (BMI) 25.0-29.9        4. Prostatism  PSA, Total and Free      5. Abnormal breath sounds        6. Pulmonary nodule        7. Calculus of gallbladder without cholecystitis without obstruction        8. Pure hypercholesterolemia        9. Gout of foot, unspecified cause, unspecified chronicity, unspecified laterality  Uric Acid      10. Mixed hyperlipidemia  Lipid, Fasting        Hypertension hyperlipidemia asymptomatic aortic stenosis and widened aorta. Doing well continue current medications. CMP and lipid profile. Overweight. Lost almost 50 pounds over the last few years praise given. Prostatism recheck PSA family history of prostate cancer continue on Flomax. Asymptomatic gallstones. Reminded with symptoms would indicate intervention needed. History of gout uric acid. History of melanoma following with dermatologist.    General health maintenance discussed COVID and flu boosters    On this date I have spent 40 minutes reviewing previous notes, test results, and face to face with the patient discussing the diagnosis and plan          No follow-ups on file. Diagnosis and treatment discussed.   Possible side effects of medication reviewed  Patients questions answered  Follow up understood  Pt aware if they are not contacted about any test results , this does not mean they are normal.  They should call

## 2022-09-05 LAB
PROSTATE SPECIFIC ANTIGEN FREE: <0.1 UG/L
PROSTATE SPECIFIC ANTIGEN PERCENT FREE: NORMAL %
PROSTATE SPECIFIC ANTIGEN: <0.1 UG/L (ref 0–4)

## 2022-09-09 ENCOUNTER — TELEPHONE (OUTPATIENT)
Dept: FAMILY MEDICINE CLINIC | Age: 82
End: 2022-09-09

## 2022-09-09 NOTE — TELEPHONE ENCOUNTER
Patient is calling back regarding his lab results. Patient states that he had not stopped or ran out of his cholesterol medication. Lab notes state Dr. Earma Cooks would like to confirm if he did or did not. Patient would also like to know what his lab results were in detail. You can reach the Patient at [phone]. Thanks!

## 2022-09-19 DIAGNOSIS — M10.9 GOUT OF FOOT, UNSPECIFIED CAUSE, UNSPECIFIED CHRONICITY, UNSPECIFIED LATERALITY: ICD-10-CM

## 2022-09-19 RX ORDER — ALLOPURINOL 300 MG/1
TABLET ORAL
Qty: 90 TABLET | Refills: 3 | OUTPATIENT
Start: 2022-09-19

## 2022-09-21 NOTE — TELEPHONE ENCOUNTER
Spoke with pharmacy, they will need new rx. The year supply from march somehow cancelled on their end. Patient informed I will sent this back to Dr. Max Garrison and should be filled tonight.

## 2022-09-21 NOTE — TELEPHONE ENCOUNTER
Pt called in stating he ask his pharmacy to refill his allopurinol (ZYLOPRIM) 300 MG tablet and they said they sent a request to us and that Dr. Neha Jolley denied it. Pt is wanting to know why it was denied. Pt states he only has 6 pills left. Pt uses [facility]. [phone].

## 2022-09-22 DIAGNOSIS — M10.9 GOUT OF FOOT, UNSPECIFIED CAUSE, UNSPECIFIED CHRONICITY, UNSPECIFIED LATERALITY: ICD-10-CM

## 2022-09-22 RX ORDER — ALLOPURINOL 300 MG/1
TABLET ORAL
Qty: 90 TABLET | Refills: 3 | OUTPATIENT
Start: 2022-09-22

## 2022-09-22 NOTE — TELEPHONE ENCOUNTER
Pharmacy calling RE: alipurinol refill. Pt had stated it was called in yesterday. Per pharmacy, they did not receive refill. Please advise.  Please call sridevi celis back at [phone]

## 2022-09-22 NOTE — TELEPHONE ENCOUNTER
Medication:   Requested Prescriptions     Pending Prescriptions Disp Refills    allopurinol (ZYLOPRIM) 300 MG tablet [Pharmacy Med Name: ALLOPURINOL 300 MG TABLET] 90 tablet 3     Sig: TAKE ONE TABLET BY MOUTH DAILY        Last Filled:  3/1/2022    Patient Phone Number: [phone] (home)     Last appt: 9/1/2022   Next appt: Visit date not found    Last OARRS: No flowsheet data found.

## 2022-09-25 DIAGNOSIS — M10.9 GOUT OF FOOT, UNSPECIFIED CAUSE, UNSPECIFIED CHRONICITY, UNSPECIFIED LATERALITY: ICD-10-CM

## 2022-09-26 ENCOUNTER — TELEPHONE (OUTPATIENT)
Dept: FAMILY MEDICINE CLINIC | Age: 82
End: 2022-09-26

## 2022-09-26 RX ORDER — ALLOPURINOL 300 MG/1
TABLET ORAL
Qty: 90 TABLET | Refills: 3 | Status: SHIPPED | OUTPATIENT
Start: 2022-09-26

## 2022-09-26 NOTE — TELEPHONE ENCOUNTER
Pt called in very upset wanting to know if why his prescription keeps getting denied. Would like the doctor to hand write prescriptions and he will come to the office and pick them up today. States that he does not trust us or the pharmacy.     Please advise

## 2022-09-27 ENCOUNTER — OFFICE VISIT (OUTPATIENT)
Dept: INTERNAL MEDICINE CLINIC | Age: 82
End: 2022-09-27
Payer: COMMERCIAL

## 2022-09-27 VITALS
WEIGHT: 190 LBS | HEART RATE: 89 BPM | SYSTOLIC BLOOD PRESSURE: 120 MMHG | DIASTOLIC BLOOD PRESSURE: 70 MMHG | TEMPERATURE: 98 F | OXYGEN SATURATION: 95 % | BODY MASS INDEX: 29.76 KG/M2

## 2022-09-27 DIAGNOSIS — C43.4 MALIGNANT MELANOMA OF NECK (HCC): ICD-10-CM

## 2022-09-27 DIAGNOSIS — I35.0 NONRHEUMATIC AORTIC VALVE STENOSIS: ICD-10-CM

## 2022-09-27 DIAGNOSIS — I10 PRIMARY HYPERTENSION: ICD-10-CM

## 2022-09-27 DIAGNOSIS — M10.9 GOUT OF FOOT, UNSPECIFIED CAUSE, UNSPECIFIED CHRONICITY, UNSPECIFIED LATERALITY: ICD-10-CM

## 2022-09-27 DIAGNOSIS — Z23 FLU VACCINE NEED: ICD-10-CM

## 2022-09-27 DIAGNOSIS — R91.1 PULMONARY NODULE: Primary | ICD-10-CM

## 2022-09-27 PROCEDURE — 90694 VACC AIIV4 NO PRSRV 0.5ML IM: CPT | Performed by: STUDENT IN AN ORGANIZED HEALTH CARE EDUCATION/TRAINING PROGRAM

## 2022-09-27 PROCEDURE — G8427 DOCREV CUR MEDS BY ELIG CLIN: HCPCS | Performed by: STUDENT IN AN ORGANIZED HEALTH CARE EDUCATION/TRAINING PROGRAM

## 2022-09-27 PROCEDURE — 99214 OFFICE O/P EST MOD 30 MIN: CPT | Performed by: STUDENT IN AN ORGANIZED HEALTH CARE EDUCATION/TRAINING PROGRAM

## 2022-09-27 PROCEDURE — G8417 CALC BMI ABV UP PARAM F/U: HCPCS | Performed by: STUDENT IN AN ORGANIZED HEALTH CARE EDUCATION/TRAINING PROGRAM

## 2022-09-27 PROCEDURE — 1036F TOBACCO NON-USER: CPT | Performed by: STUDENT IN AN ORGANIZED HEALTH CARE EDUCATION/TRAINING PROGRAM

## 2022-09-27 PROCEDURE — 90471 IMMUNIZATION ADMIN: CPT | Performed by: STUDENT IN AN ORGANIZED HEALTH CARE EDUCATION/TRAINING PROGRAM

## 2022-09-27 PROCEDURE — 1123F ACP DISCUSS/DSCN MKR DOCD: CPT | Performed by: STUDENT IN AN ORGANIZED HEALTH CARE EDUCATION/TRAINING PROGRAM

## 2022-09-27 RX ORDER — MAG HYDROX/ALUMINUM HYD/SIMETH 400-400-40
SUSPENSION, ORAL (FINAL DOSE FORM) ORAL
COMMUNITY

## 2022-09-27 ASSESSMENT — ENCOUNTER SYMPTOMS
BACK PAIN: 0
VOMITING: 0
RHINORRHEA: 0
NAUSEA: 0
EYE REDNESS: 0
EYE DISCHARGE: 0
COUGH: 0
ABDOMINAL PAIN: 0
CHEST TIGHTNESS: 0
SORE THROAT: 0

## 2022-09-27 NOTE — PROGRESS NOTES
Kerry Tariq (:  1940) is a 80 y.o. male,New patient, here for evaluation of the following chief complaint(s):  Established New Doctor    Past medical chronic health issues include history neck  melanoma, partial colectomy and cystectomy for diverticulitis and fissure, assymptomatic aortic stenosis/AI and calcification of mitral valve, colonic polyps, hyperlipidemia, BPH,  gout, stable pulm nodule, hypertension, A1c 6.4, BMI 35, cholelithiasis,. # Pulmonary nodules. History of bronchitis. Pulmonary functions at the [facility]. CT abdomen and chest with calcifications and 8 mm nodule right lower lobe. Positive asbestos exposure. Yearly Ct. Follows with pulm . History of chronic rhinitis          # Mildly elevated liver function tests with negative hepatitis C, TIBC. History of alcohol and obesity. Resolved prediabetes with A1c down to 5.2021. Last liver functions normal .  Lost nearly 50 pounds with diet and exercise     # Asymptomatic gallstones. Has seen surgeon. Aware of symptoms    # History of prediabetes. Last check normal.  Weight is down. 1/2 miles 4 times per    # Hx melanoma- dx on    Yearly follow-up. 6.3978 with 2 mole excision neg  pathology recently. # Hypertension. Echocardiogram 2018 with trivial aortic stenosis/regurgitation mitral and tricuspid regurgitation. Ejection fraction 60-65%. Mild increased left ventricular wall thickness. No syncope, exercise intolerance or edema. No TIA-like symptoms. . Reports stress thallium normal in . Denies syncope or presyncope is on lisinopril hydrochlorothiazide. Vytorin. LDL 67. Sister with valve disease  2021     # History diverticulosis partial colectomy and cystectomy for fistulas. Occasional right lower quadrant discomfort. No blood in the stool or vomiting. Last colonoscopy 2013. No GE reflux. No constipation. popcorn causes pain. # Gout well-controlled with allopurinol. Last flare 94. # Recent elevation of PSA now down to 1.6  Flomax. Prostatism. No longer in following with urologist.  Josee Collazo about switching from a max to another medication. Some difficulty with ejaculation. Flomax has been helpful for urine flow. Discussed about avoiding drinking water prior to going bed. # [place] 2021 Dr. 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 Facet osteoarthritis atherosclerotic calcification of the aorta     # hernia repair left- three times, improved after mesh repair. fatty tumor forearm  Sigmoid colectomy and blader repair. 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 # Gout- no further episodes since . # BPH- flomax. # Wife  2021 CLL, atypical Mycobacterium and cirrhosis. Remarried on 2022 and happy with. ASSESSMENT/PLAN:  1. Pulmonary nodule-history of exposure to asbestosis while working in [employer], gets CT scan and is following up with pulmonologist.  Last CT with out contrast done in  showed stable nodules. 2. Nonrheumatic aortic valve stenosis-and asymptomatic. Stable and follows cardiologist.    3. Malignant melanoma of neck ([facility] [address] status post removal from his right upper back around . Follows up with dermatologist on regular basis. 4. Gout of foot, unspecified cause, unspecified chronicity, unspecified laterality-stable on allopurinol 300 mg daily. Had a history of gout in his foot, last flareup in . 5. Primary hypertension  Stable. Continue on Prinzide 20/12.5 mg daily. BP Readings from Last 3 Encounters:   22 120/70   22 130/71   22 124/60       6. Flu vaccine need  -     Influenza, FLUAD, (age 72 y+), IM, Preservative Free, 0.5 mL    Return in about 6 months (around 3/27/2023) for HTN, Blood work, annual physical examination.          Subjective   SUBJECTIVE/OBJECTIVE:  HPI    Review of Systems   Constitutional:  Negative for chills, fatigue and fever. HENT:  Negative for congestion, ear pain, rhinorrhea and sore throat. Eyes:  Negative for discharge, redness and visual disturbance. Respiratory:  Negative for cough and chest tightness. Cardiovascular:  Negative for chest pain, palpitations and leg swelling. Gastrointestinal:  Negative for abdominal pain, nausea and vomiting. Endocrine: Negative. Genitourinary:  Negative for dysuria. Musculoskeletal:  Negative for back pain and gait problem. Skin: Negative. Neurological:  Negative for syncope, facial asymmetry, speech difficulty, light-headedness and headaches. Objective   Physical Exam  Vitals reviewed. Constitutional:       Appearance: Normal appearance. HENT:      Head: Normocephalic. Eyes:      Extraocular Movements: Extraocular movements intact. Pupils: Pupils are equal, round, and reactive to light. Cardiovascular:      Rate and Rhythm: Normal rate. Heart sounds: Murmur (Systolic murmur.) heard. Pulmonary:      Effort: Pulmonary effort is normal.      Breath sounds: Normal breath sounds. Abdominal:      General: Bowel sounds are normal.      Palpations: Abdomen is soft. Musculoskeletal:         General: Normal range of motion. Skin:     General: Skin is warm. Neurological:      General: No focal deficit present. Mental Status: He is alert and oriented to person, place, and time. Mental status is at baseline. Psychiatric:         Mood and Affect: Mood normal.      Please note that this chart was generated using dragon dictation software. Although every effort was made to ensure the accuracy of this automated transcription, some errors in transcription may have occurred. An electronic signature was used to authenticate this note.     --Bernadette Wade MD

## 2022-09-28 ENCOUNTER — TELEPHONE (OUTPATIENT)
Dept: INTERNAL MEDICINE CLINIC | Age: 82
End: 2022-09-28

## 2022-10-04 DIAGNOSIS — R35.1 NOCTURIA: ICD-10-CM

## 2022-10-04 RX ORDER — TAMSULOSIN HYDROCHLORIDE 0.4 MG/1
0.4 CAPSULE ORAL DAILY
Qty: 90 CAPSULE | Refills: 1 | Status: SHIPPED | OUTPATIENT
Start: 2022-10-04

## 2022-10-04 RX ORDER — HYDROCORTISONE 25 MG/G
CREAM TOPICAL
Qty: 28 G | Refills: 5 | Status: SHIPPED | OUTPATIENT
Start: 2022-10-04

## 2022-10-04 NOTE — TELEPHONE ENCOUNTER
Norma Beck [phone] (home)    is requesting refill(s) of FLOMAX and ANUSOL     to preferred [facility] [address]   Last refill  ANUSOL-10/11/21 Flomax -4/8/22

## 2022-10-17 DIAGNOSIS — I10 ESSENTIAL HYPERTENSION, BENIGN: ICD-10-CM

## 2022-10-17 RX ORDER — EZETIMIBE AND SIMVASTATIN 10; 40 MG/1; MG/1
TABLET ORAL
Qty: 90 TABLET | Refills: 2 | Status: SHIPPED | OUTPATIENT
Start: 2022-10-17

## 2022-10-17 RX ORDER — LISINOPRIL AND HYDROCHLOROTHIAZIDE 20; 12.5 MG/1; MG/1
TABLET ORAL
Qty: 90 TABLET | Refills: 2 | Status: SHIPPED | OUTPATIENT
Start: 2022-10-17

## 2022-10-17 NOTE — TELEPHONE ENCOUNTER
Last office visit 9/27/22        Future Appointments   Date Time Provider Vivek Knight   1/23/2023  8:20 AM Emilia Newberry [facility]UL[facility]   3/1/2023  8:30 AM Trever Uribe MD [facility]

## 2022-11-23 ENCOUNTER — TELEPHONE (OUTPATIENT)
Dept: PRIMARY CARE CLINIC | Age: 82
End: 2022-11-23

## 2022-11-23 NOTE — TELEPHONE ENCOUNTER
Pt called in wanting to know if Dr. Adilson Trevino would be able to work him in today. PT is complaining of a sore throat. No other symptoms.      Please call back: [phone]

## 2022-11-23 NOTE — TELEPHONE ENCOUNTER
PT called back in stating that there is no need to call him back for an appointment. He feels that he will be fine and doesn't need to be seen.

## 2022-12-20 DIAGNOSIS — M10.9 GOUT OF FOOT, UNSPECIFIED CAUSE, UNSPECIFIED CHRONICITY, UNSPECIFIED LATERALITY: ICD-10-CM

## 2022-12-20 RX ORDER — ALLOPURINOL 300 MG/1
TABLET ORAL
Qty: 90 TABLET | Refills: 3 | Status: SHIPPED | OUTPATIENT
Start: 2022-12-20

## 2023-01-06 ENCOUNTER — OFFICE VISIT (OUTPATIENT)
Dept: INTERNAL MEDICINE CLINIC | Age: 83
End: 2023-01-06

## 2023-01-06 VITALS
WEIGHT: 190 LBS | OXYGEN SATURATION: 96 % | SYSTOLIC BLOOD PRESSURE: 134 MMHG | RESPIRATION RATE: 16 BRPM | TEMPERATURE: 97.6 F | HEART RATE: 45 BPM | BODY MASS INDEX: 29.82 KG/M2 | HEIGHT: 67 IN | DIASTOLIC BLOOD PRESSURE: 70 MMHG

## 2023-01-06 DIAGNOSIS — M75.01 ADHESIVE CAPSULITIS OF RIGHT SHOULDER: Primary | ICD-10-CM

## 2023-01-06 DIAGNOSIS — J30.2 SEASONAL ALLERGIES: ICD-10-CM

## 2023-01-06 RX ORDER — ECHINACEA PURPUREA EXTRACT 125 MG
1 TABLET ORAL PRN
Qty: 1 EACH | Refills: 3 | Status: SHIPPED | OUTPATIENT
Start: 2023-01-06

## 2023-01-06 RX ORDER — CETIRIZINE HYDROCHLORIDE 5 MG/1
5 TABLET ORAL DAILY
Qty: 30 TABLET | Refills: 0 | Status: SHIPPED | OUTPATIENT
Start: 2023-01-06 | End: 2023-02-05

## 2023-01-06 ASSESSMENT — ENCOUNTER SYMPTOMS
EYE DISCHARGE: 0
BACK PAIN: 0
COUGH: 0
VOMITING: 0
ABDOMINAL PAIN: 0
SORE THROAT: 0
NAUSEA: 0
CHEST TIGHTNESS: 0
EYE REDNESS: 0
RHINORRHEA: 0

## 2023-01-06 ASSESSMENT — PATIENT HEALTH QUESTIONNAIRE - PHQ9
2. FEELING DOWN, DEPRESSED OR HOPELESS: 0
SUM OF ALL RESPONSES TO PHQ QUESTIONS 1-9: 0
SUM OF ALL RESPONSES TO PHQ QUESTIONS 1-9: 0
SUM OF ALL RESPONSES TO PHQ9 QUESTIONS 1 & 2: 0
SUM OF ALL RESPONSES TO PHQ QUESTIONS 1-9: 0
1. LITTLE INTEREST OR PLEASURE IN DOING THINGS: 0
SUM OF ALL RESPONSES TO PHQ QUESTIONS 1-9: 0

## 2023-01-06 NOTE — PROGRESS NOTES
Asia Gasca (:  1940) is a [de-identified] y.o. male,Established patient, here for evaluation of the following chief complaint(s):  Joint Pain (Bilateral shoulders , elbows and wrist area )    Past medical chronic health issues include history neck  melanoma, partial colectomy and cystectomy for diverticulitis and fissure, assymptomatic aortic stenosis/AI and calcification of mitral valve, colonic polyps, hyperlipidemia, BPH,  gout, stable pulm nodule, hypertension, A1c 6.4, BMI 35, cholelithiasis,. # Pulmonary nodules. History of bronchitis. Pulmonary functions at the  [facility]. CT abdomen and chest with calcifications and 8 mm nodule right lower lobe. Positive asbestos exposure. Yearly Ct. Follows with pulm . History of chronic rhinitis           # Mildly elevated liver function tests with negative hepatitis C, TIBC. History of alcohol and obesity. Resolved prediabetes with A1c down to 5.2021. Last liver functions normal .  Lost nearly 50 pounds with diet and exercise     # Asymptomatic gallstones. Has seen surgeon. Aware of symptoms     # History of prediabetes. Last check normal.  Weight is down. 1/2 miles 4 times per     # Hx melanoma- dx on    Yearly follow-up. 0.8747 with 2 mole excision neg  pathology recently. # Hypertension. Echocardiogram 2018 with trivial aortic stenosis/regurgitation mitral and tricuspid regurgitation. Ejection fraction 60-65%. Mild increased left ventricular wall thickness. No syncope, exercise intolerance or edema. No TIA-like symptoms. . Reports stress thallium normal in . Denies syncope or presyncope is on lisinopril hydrochlorothiazide. Vytorin. LDL 67. Sister with valve disease  2021     # History diverticulosis partial colectomy and cystectomy for fistulas. Occasional right lower quadrant discomfort. No blood in the stool or vomiting. Last colonoscopy 2013. No GE reflux. No constipation. popcorn causes pain.      # Gout well-controlled with allopurinol. Last flare 94. # Recent elevation of PSA now down to 1.6  Flomax. Prostatism. No longer in following with urologist.  Dejuan Ceja about switching from a max to another medication. Some difficulty with ejaculation. Flomax has been helpful for urine flow. Discussed about avoiding drinking water prior to going bed. # [place] 2021 Dr. 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 Facet osteoarthritis atherosclerotic calcification of the aorta     # hernia repair left- three times, improved after mesh repair. fatty tumor forearm  Sigmoid colectomy and blader repair. 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 # Gout- no further episodes since . # BPH- flomax. # Wife  2021 CLL, atypical Mycobacterium and cirrhosis. Remarried on 2022 and happy with. ASSESSMENT/PLAN:  1. Adhesive capsulitis of right shoulder-pain management with Tylenol as needed. Advised the patient to limit the use of Advil/Aleve. Will make a referral to physical therapy. -     [address] [facility] allergies-occasionally gets nasal congestions along with PND. Advised to use medications when he gets this flareup  -     sodium chloride (ALTAMIST SPRAY) 0.65 % nasal spray; 1 spray by Nasal route as needed for Congestion, Disp-1 each, R-3Normal  -     cetirizine (ZYRTEC) 5 MG tablet; Take 1 tablet by mouth daily, Disp-30 tablet, R-0Normal    Return in about 3 months (around 2023). Subjective   SUBJECTIVE/OBJECTIVE:  Right shoulder pain, Was on tyleol. No heavy lyft, no trauma. Davion after, no decoraation. Review of Systems   Constitutional:  Negative for chills, fatigue and fever. HENT:  Negative for congestion, ear pain, rhinorrhea and sore throat.     Eyes:  Negative for discharge, redness and visual disturbance. Respiratory:  Negative for cough and chest tightness. Cardiovascular:  Negative for chest pain, palpitations and leg swelling. Gastrointestinal:  Negative for abdominal pain, nausea and vomiting. Endocrine: Negative. Genitourinary:  Negative for dysuria. Musculoskeletal:  Negative for back pain and gait problem. Skin: Negative. Neurological:  Negative for syncope, facial asymmetry, speech difficulty, light-headedness and headaches. Objective   Physical Exam  Vitals reviewed. Constitutional:       Appearance: Normal appearance. HENT:      Head: Normocephalic. Eyes:      Extraocular Movements: Extraocular movements intact. Pupils: Pupils are equal, round, and reactive to light. Cardiovascular:      Rate and Rhythm: Normal rate. Heart sounds: Normal heart sounds. No murmur heard. Pulmonary:      Effort: Pulmonary effort is normal.      Breath sounds: Normal breath sounds. Abdominal:      General: Bowel sounds are normal.      Palpations: Abdomen is soft. Musculoskeletal:         General: No swelling, tenderness, deformity or signs of injury. Normal range of motion. Comments: Empty can test negative. Mild stiffness and pain on abduction. Skin:     General: Skin is warm. Neurological:      General: No focal deficit present. Mental Status: He is alert and oriented to person, place, and time. Mental status is at baseline. Psychiatric:         Mood and Affect: Mood normal.              Please note that this chart was generated using dragon dictation software. Although every effort was made to ensure the accuracy of this automated transcription, some errors in transcription may have occurred. An electronic signature was used to authenticate this note.     --Mario Mora MD

## 2023-01-09 ENCOUNTER — TELEPHONE (OUTPATIENT)
Dept: INTERNAL MEDICINE CLINIC | Age: 83
End: 2023-01-09

## 2023-01-09 DIAGNOSIS — M25.512 ACUTE PAIN OF LEFT SHOULDER: Primary | ICD-10-CM

## 2023-01-09 NOTE — TELEPHONE ENCOUNTER
Patient need referral for BOTH shoulders, org referral only mentions right shoulder. 1st physical therapy appt is 1/18/23 at [address]. Just needs it changed before that appt or they will only treat the right shoulder. Please call [facility] with questions.  [phone] ok to leave message

## 2023-01-17 NOTE — FLOWSHEET NOTE
[facility]. [address]  Phone: [phone]   Fax:     [phone]        Physical Therapy Treatment Note/ Progress Report:       Date:  2023    Patient Name:  Frank Camara    :  1940  MRN: [id number]    Pertinent Medical History:     Medical/Treatment Diagnosis Information:  Medical Diagnosis: Adhesive capsulitis of right shoulder [M75.01]  Treatment Diagnosis: Bilateral shoulder hypomobility limiting tolerance to adl's    Insurance/Certification information:  PT Insurance Information: [address]  Physician Information:  Luly Marie MD  Plan of care signed (Y/N): routed 23    Date of Patient follow up with Physician:      Progress Report: []  Yes  [x]  No     Date Range for reporting period:  Beginnin2023  Ending:      Progress report due (10 Rx/or 30 days whichever is less): 8/15/14     Recertification due (POC duration/ or 90 days whichever is less):      Visit # POC/Insurance Allowable Auth Needed   1 UHC/ DED NOT MET/ NO COPAY/ NO AUTH/ []Yes    [x]No     Functional Outcomes Measure:   Date Assessed:  Test: FOTO  Score:     Pain level:  2-7/10     History of Injury: Patient stated complaint:  right shoulder pain and stiffness began ~ 2 weeks ago insidiously , c/o pain in multiple other joints including left shoulder , denies previous h/o injury , C/O CONSTANT  bilat.  Shoulder pain, denies radiating pain, PARESTHESIA - DENIES ANY, INCREASED PAIN  with toilet hygiene , dressing, lying on either shoulder ,  DECREASED PAIN  with relative rest, Advi and heat , WORK  he is a retired  , ACTIVITIES  include walking    SUBJECTIVE:  See eval    OBJECTIVE:   Observation:   Test measurements:      RESTRICTIONS/PRECAUTIONS:     Exercises/Interventions:   Therapeutic Ex (86239)  Min: Resistance/Reps Cues/Notes   OH pulley     Wall slides HEP 1/18/23 added     Manual Intervention  (76317)  Min:15     GHJ mobs Bilat. Shoulders caudal post. Gr 4     PROM  Bilat. Shoulder ER scaption                         NMR re-education (99279)  Min:     Scap stab. T slide   Corner push outs      RC activation at end range   SL'ing ER  T slide                     Therapeutic Activity (97454)  Min:               Modalities:  Min                 Other Therapeutic Activities:  1/18/23 Pt was educated on PT POC, Diagnosis, Prognosis, pathomechanics as well as frequency and duration of scheduling future physical therapy appointments. Time was also taken on this day to answer all patient questions and participation in PT. Reviewed appointment policy in detail with patient and patient verbalized understanding. Home Exercise Program:  HEP instruction: Access Code: [email or web address]  URL: [email or web address]  Date: 01/18/2023  Prepared by: Keya Hinojosa     Exercises  Seated Shoulder Flexion Towel Slide at Table Top - 2-3 x daily - 7 x weekly - 1-2 sets - 10 reps - 5-10 hold  Seated Shoulder Abduction Towel Slide at Table Top - 2-3 x daily - 7 x weekly - 1-2 sets - 10 reps - 5-10 hold  Seated Shoulder External Rotation PROM on Table - 2-3 x daily - 7 x weekly - 1-2 sets - 10 reps - 5-10 hold  The patient demonstrated good tolerance to and understanding of the HEP. Written instructions have been issued.     Therapeutic Exercise and NMR EXR  [] (41452) Provided verbal/tactile cueing for activities related to strengthening, flexibility, endurance, ROM  for improvements in scapular, scapulothoracic and UE control with self care, reaching, carrying, lifting, house/yardwork, driving/computer work.    [] (24454) Provided verbal/tactile cueing for activities related to improving balance, coordination, kinesthetic sense, posture, motor skill, proprioception  to assist with  scapular, scapulothoracic and UE control with self care, reaching, carrying, lifting, house/yardwork, driving/computer work. Therapeutic Activities:    [] (00924 or 19307) Provided verbal/tactile cueing for activities related to improving balance, coordination, kinesthetic sense, posture, motor skill, proprioception and motor activation to allow for proper function of scapular, scapulothoracic and UE control with self care, carrying, lifting, driving/computer work.      Home Exercise Program:    [x] (03486) Reviewed/Progressed HEP activities related to strengthening, flexibility, endurance, ROM of scapular, scapulothoracic and UE control with self care, reaching, carrying, lifting, house/yardwork, driving/computer work  [] (44820) Reviewed/Progressed HEP activities related to improving balance, coordination, kinesthetic sense, posture, motor skill, proprioception of scapular, scapulothoracic and UE control with self care, reaching, carrying, lifting, house/yardwork, driving/computer work      Manual Treatments:  PROM / STM / Oscillations-Mobs:  G-I, II, III, IV (PA's, Inf., Post.)  [] (74200) Provided manual therapy to mobilize soft tissue/joints of cervical/CT, scapular GHJ and UE for the purpose of modulating pain, promoting relaxation,  increasing ROM, reducing/eliminating soft tissue swelling/inflammation/restriction, improving soft tissue extensibility and allowing for proper ROM for normal function with self care, reaching, carrying, lifting, house/yardwork, driving/computer work    Charges:  Timed Code Treatment Minutes: 30   Total Treatment Minutes: 50       [x] EVAL (LOW) 71525 (typically 20 minutes face-to-face)  [] EVAL (MOD) 23875 (typically 30 minutes face-to-face)  [] EVAL (HIGH) 19125 (typically 45 minutes face-to-face)  [] RE-EVAL     [x] UM(26980) x     [] Dry needle 1 or 2 Muscles (87014)  [] NMR (05768) x     [] Dry needle 3+ Muscles (04115)  [x] Manual (92386) x     [] Ultrasound (19476) x  [] TA (76477) x     [] Mech Traction (35666)  [] ES(attended) (86770)     [] ES (un) (47271):   [] Vasopump (27852) [] Ionto (47824)   [] Other:    If BWC Please Indicate Time In/Out  CPT Code Time in Time out                                   Approval Dates:  CPT Code Units Approved Units Used  Date Updated:                     Brissa Stratton stated goal: sleep better and improve mobility   [] Progressing: [] Met: [] Not Met: [] Adjusted     Therapist goals for Patient:   Short Term Goals: To be achieved in: 2 weeks  1. Independent in HEP and progression per patient tolerance, in order to prevent re-injury. [] Progressing: [] Met: [] Not Met: [] Adjusted  2. Patient will have a decrease in pain 5/10 to facilitate improvement in movement, function, and ADLs as indicated by Functional Deficits. [] Progressing: [] Met: [] Not Met: [] Adjusted     Long Term Goals: To be achieved in: 4-6 weeks  1. Increase FOTO functional outcome score from 69 to 75  to assist with reaching prior level of function. [] Progressing: [] Met: [] Not Met: [] Adjusted  2. Patient will demonstrate increased AROM to to wfl's at bilateral shoulders  to allow for proper joint functioning as indicated by Functional Deficits. [] Progressing: [] Met: [] Not Met: [] Adjusted  3. Patient will demonstrate an increase in NM recruitment/activation and overall GH and scapular strength to within n5lbs HHD or WNL for proper functional mobility as indicated by patients Functional Deficits. [] Progressing: [] Met: [] Not Met: [] Adjusted  4. Patient will return to reaching for hygiene, dressing and household   activities without increased symptoms or restriction. [] Progressing: [] Met: [] Not Met: [] Adjusted  5. Patient will have a decrease in pain 0-2/10 to facilitate improvement in movement, function, and ADLs as indicated by Functional Deficits.          [] Progressing: [] Met: [] Not Met: [] Adjusted    ASSESSMENT:  See eval    Treatment/Activity Tolerance:  [x] Patient tolerated treatment well [] Patient limited by kalyani  [] Patient limited by pain  [] Patient limited by other medical complications  [] Other:     Overall Progression Towards Functional goals/ Treatment Progress Update:  [] Patient is progressing as expected towards functional goals listed. [] Progression is slowed due to complexities/Impairments listed. [] Progression has been slowed due to co-morbidities. [x] Plan just implemented, too soon to assess goals progression <30days   [] Goals require adjustment due to lack of progress  [] Patient is not progressing as expected and requires additional follow up with physician  [] Other    Prognosis for POC: [x] Good [] Fair  [] Poor    Patient requires continued skilled intervention: [x] Yes  [] No      PLAN: Recover rom and function at bilateral shoulders focus  L>R due to greater limitations , cont. As above to facilitate static and dynamic control at shoulders    [] Continue per plan of care [] Alter current plan (see comments)  [x] Plan of care initiated [] Hold pending MD visit [] Discharge    Electronically signed by: Lorraine Figueroa PT     Note: If patient does not return for scheduled/recommended follow up visits, this note will serve as a discharge from care along with the most recent update on progress.

## 2023-01-17 NOTE — PLAN OF CARE
[facility]. [address]  Phone: [phone]   Fax:     [phone]                                                        Physical Therapy Certification    Dear Cris You MD,    We had the pleasure of evaluating the following patient for physical therapy services at [facility]. A summary of our findings can be found in the initial assessment below. This includes our plan of care. If you have any questions or concerns regarding these findings, please do not hesitate to contact me at the office phone number checked above. Thank you for the referral.       Physician Signature:_______________________________Date:__________________  By signing above (or electronic signature), therapists plan is approved by physician      Patient: Indigo Patino   : 1940   MRN: [id number]  Referring Physician: Cris You MD      Evaluation Date: 2023      Medical Diagnosis Information:  Medical Diagnosis: Adhesive capsulitis of right shoulder [M75.01]   Treatment Diagnosis: Bilateral shoulder hypomobility limiting tolerance to adl's                                Insurance information: PT Insurance Information: [facility]    Precautions/ Contra-indications:   Latex Allergy:   [x]  NO      []YES  Preferred Language for Healthcare:   [x]English       []other:    C-SSRS Triggered by Intake questionnaire (Past 2 wk assessment ):   [x] No, Questionnaire did not trigger screening.   [] Yes, Patient intake triggered C-SSRS Screening      [] C-SSRS Screening completed  [] PCP notified via Epic     SUBJECTIVE: Patient stated complaint:  right shoulder pain and stiffness began ~ 2 weeks ago insidiously , c/o pain in multiple other joints including left shoulder , denies previous h/o injury , C/O CONSTANT  bilat.  Shoulder pain, denies radiating pain, PARESTHESIA - DENIES ANY, INCREASED PAIN  with toilet hygiene , dressing, lying on either shoulder ,  DECREASED PAIN  with relative rest, Advi and heat , WORK  he is a retired  , ACTIVITIES  include walking    Relevant Medical History:   Functional Outcomes Measure: UEFI = 69  Pain Scale: 2-7/10      Living Status/Prior Level of Function: Independent with ADLs and IADLs,     OBJECTIVE:   Posture: wfl's    Functional Mobility/Transfers: independent    Palpation: ttp left supraspinatus , biceps muscle long head tendon    Bandages/Dressings/Incisions: NA    Gait: (include devices/WB status): WNL     CERV ROM     Cervical Flexion wfl's    Cervical Extension \"    Cervical SB \"    Cervical rotation \"         ROM Left Right   Shoulder Flex 145 156   Shoulder Abd 136 144   Shoulder ER 64 61   Shoulder IR 61 58             Strength  Left Right   Shoulder Flex 4+/5 5/5   Shoulder Scap 4+/5 5/5   Shoulder ER 5/5 5/5   Shoulder IR 5/5 5/5           Reflexes Normal Abnormal Comments   [x]ALL NORMAL            S1-2 Seated achilles [] []    S1-2 Prone knee bend [] []    L3-4 Patellar tendon [] []    C5-6 Biceps [x] []    C6 Brachioradialis [x] []    C7-8 Triceps [x] []    Clonus [] []    Babinski [] []    Bradley's [] []      Reflexes/Sensation:    [x]Dermatomes/Myotomes intact    [x]Reflexes equal and normal bilaterally   []Other:    Joint mobility:    []Normal    [x]Hypo Bilat GHJ and scap. Thoracic    []Hyper    Orthopedic Special Tests:   Shoulder Special Test  Positive  Negative  Comments    Speeds [x] [] L only   [employer]  [x] [] L only   Neers  [] [x] Bilat. Drop Arm [] [x] Bilat. Belly Press  [] []    Charley Fast [] []    Empty Can [] [x] Bilat. Hillary Lone [] []    Biceps Load [] []    Kwong's [x] [] L only   Dynamic Labral shear test  [] []    Cross body adduction [] []    Crank  [] []                            [x] Patient history, allergies, meds reviewed. Medical chart reviewed. See intake form. Review Of Systems (ROS):  [x]Performed Review of systems (Integumentary, CardioPulmonary, Neurological) by intake and observation. Intake form has been scanned into medical record. Patient has been instructed to contact their primary care physician regarding ROS issues if not already being addressed at this time. Co-morbidities/Complexities (which will affect course of rehabilitation):   []None           Arthritic conditions   []Rheumatoid arthritis (M05.9)  []Osteoarthritis (M19.91)   Cardiovascular conditions   [x]Hypertension (I10)  [x]Hyperlipidemia (E78.5)  []Angina pectoris (I20)  []Atherosclerosis (I70)  []CVA Musculoskeletal conditions   []Disc pathology   []Congenital spine pathologies   []Prior surgical intervention  []Osteoporosis (M81.8)  []Osteopenia (M85.8)   Endocrine conditions   []Hypothyroid (E03.9)  []Hyperthyroid Gastrointestinal conditions   []Constipation (K17.06)   Metabolic conditions   [x]Morbid obesity (E66.01)  [x]Diabetes type 1(E10.65) or 2 (E11.65)   []Neuropathy (G60.9)     Pulmonary conditions   []Asthma (J45)  []Coughing   []COPD (J44.9)   Psychological Disorders  []Anxiety (F41.9)  []Depression (F32.9)   []Other:   []Other:          Barriers to/and or personal factors that will affect rehab potential:              [x]Age  []Sex   []Smoker              []Motivation/Lack of Motivation                        [x]Co-Morbidities              []Cognitive Function, education/learning barriers              []Environmental, home barriers              []profession/work barriers  []past PT/medical experience  []other:  Justification:     Falls Risk Assessment (30 days):   [x] Falls Risk assessed and no intervention required.   [] Falls Risk assessed and Patient requires intervention due to being higher risk   TUG score (>12s at risk):     [] Falls education provided, including         ASSESSMENT: Skilled PT services are required to address the following impairments     Functional Impairments:     [x]Noted spinal or UE joint hypomobility   []Noted spinal or UE joint hypermobility   []Decreased spinal/UE functional ROM   []Abnormal reflexes/sensation/myotomal/dermatomal deficits   []Decreased RC/scapular/core strength and neuromuscular control    [x]Decreased UE functional strength   []other:      Functional Activity Limitations (from functional questionnaire and intake)   []Reduced ability to tolerate prolonged functional positions   []Reduced ability or difficulty with changes of positions or transfers between positions   []Reduced ability to maintain good posture and demonstrate good body mechanics with sitting, bending, and lifting   [] Reduced ability or tolerance with driving and/or computer work   [x]Reduced ability to perform lifting, reaching, carrying tasks   [x]Reduced ability to reach behind back   [x]Reduced ability to sleep    [x]Reduced ability to tolerate any impact through UE or spine   []Reduced ability to  or hold objects   [x]Reduced ability to throw or toss an object   []other:    Participation Restrictions   [x]Reduced participation in self care activities   [x]Reduced participation in home management activities   []Reduced participation in work activities   []Reduced participation in social activities. []Reduced participation in sport/recreational activities. Classification/Subgrouping:   []signs/symptoms consistent with post-surgical status including decreased ROM, strength and function.     [x]signs/symptoms consistent with joint sprain/strain    []signs/symptoms consistent with shoulder impingement (internal, external, primary or secondary)   []signs/symptoms consistent with shoulder/elbow/wrist tendinopathy   []Signs/symptoms consistent with Rotator cuff tear   []sign/symptoms consistent with labral tear   []signs/symptoms consistent with rib dysfunction   []signs/symptoms consistent with postural dysfunction   []signs/symptoms consistent with Glenohumeral IR Deficit - <45 degrees   []signs/symptoms consistent with facet dysfunction of cervical/thoracic spine   [x]signs/symptoms consistent with pathology which may benefit from Dry Needling   []signs/symptoms which may limit the use of advanced manual therapy techniques: (Elevated CV risk profile, recent trauma, intolerance to end range positions, prior TIA, visual issues, UE neurological compromise )     Prognosis/Rehab Potential:      []Excellent   [x]Good    []Fair   []Poor    Tolerance of evaluation/treatment:    []Excellent   [x]Good    []Fair   []Poor    Physical Therapy Evaluation Complexity Justification  [x] A history of present problem with:  [] no personal factors and/or comorbidities that impact the plan of care;  []1-2 personal factors and/or comorbidities that impact the plan of care  [x]3 personal factors and/or comorbidities that impact the plan of care  [x] An examination of body systems using standardized tests and measures addressing any of the following: body structures and functions (impairments), activity limitations, and/or participation restrictions;:  [] a total of 1-2 or more elements   [x] a total of 3 or more elements   [] a total of 4 or more elements   [x] A clinical presentation with:  [x] stable and/or uncomplicated characteristics   [] evolving clinical presentation with changing characteristics  [] unstable and unpredictable characteristics;   [x] Clinical decision making of [x] low, [] moderate, [] high complexity using standardized patient assessment instrument and/or measurable assessment of functional outcome.     [x] EVAL (LOW) 60058 (typically 20 minutes face-to-face)  [] EVAL (MOD) 13885 (typically 30 minutes face-to-face)  [] EVAL (HIGH) 04573 (typically 45 minutes face-to-face)  [] RE-EVAL     PLAN: see flow sheet  Frequency/Duration:  2 days per week for 4-6 Weeks:  Interventions:  [x]  Therapeutic exercise including: strength training, ROM, for scapula, core and Upper extremity, including postural re-education. [x]  NMR activation and proprioception for UE, periscapular and RC muscles and Core, including postural re-education. [x]  Manual therapy as indicated for shoulder, scapula, spine and associated soft tissue including: Dry Needling/IASTM, STM, PROM, Gr I-IV mobilizations, manipulation. [x] Modalities as needed that may include: thermal agents, E-stim, Biofeedback, US, iontophoresis as indicated  [x] Patient education on joint protection, postural re-education, activity modification, progression of HEP. HEP instruction: Access Code: [email or web address]  URL: [email or web address]  Date: 01/18/2023  Prepared by: Hyun Joy    Exercises  Seated Shoulder Flexion Towel Slide at Table Top - 2-3 x daily - 7 x weekly - 1-2 sets - 10 reps - 5-10 hold  Seated Shoulder Abduction Towel Slide at Table Top - 2-3 x daily - 7 x weekly - 1-2 sets - 10 reps - 5-10 hold  Seated Shoulder External Rotation PROM on Table - 2-3 x daily - 7 x weekly - 1-2 sets - 10 reps - 5-10 hold  The patient demonstrated good tolerance to and understanding of the HEP. Written instructions have been issued. GOALS:  Patient stated goal: sleep better and improve mobility   [] Progressing: [] Met: [] Not Met: [] Adjusted    Therapist goals for Patient:   Short Term Goals: To be achieved in: 2 weeks  1. Independent in HEP and progression per patient tolerance, in order to prevent re-injury. [] Progressing: [] Met: [] Not Met: [] Adjusted  2. Patient will have a decrease in pain 5/10 to facilitate improvement in movement, function, and ADLs as indicated by Functional Deficits. [] Progressing: [] Met: [] Not Met: [] Adjusted    Long Term Goals: To be achieved in: 4-6 weeks  1. Increase FOTO functional outcome score from 69 to 75  to assist with reaching prior level of function. [] Progressing: [] Met: [] Not Met: [] Adjusted  2.  Patient will demonstrate increased AROM to to wfl's at bilateral shoulders  to allow for proper joint functioning as indicated by Functional Deficits. [] Progressing: [] Met: [] Not Met: [] Adjusted  3. Patient will demonstrate an increase in NM recruitment/activation and overall GH and scapular strength to within n5lbs HHD or WNL for proper functional mobility as indicated by patients Functional Deficits. [] Progressing: [] Met: [] Not Met: [] Adjusted  4. Patient will return to reaching for hygiene, dressing and household   activities without increased symptoms or restriction. [] Progressing: [] Met: [] Not Met: [] Adjusted  5. Patient will have a decrease in pain 0-2/10 to facilitate improvement in movement, function, and ADLs as indicated by Functional Deficits. [] Progressing: [] Met: [] Not Met: [] Adjusted    Electronically signed by:  Sanchez Rehman PT      Note: If patient does not return for scheduled/recommended follow up visits, this note will serve as a discharge from care along with the most recent update on progress.

## 2023-01-18 ENCOUNTER — HOSPITAL ENCOUNTER (OUTPATIENT)
Dept: PHYSICAL THERAPY | Age: 83
Setting detail: THERAPIES SERIES
Discharge: HOME | End: 2023-01-18
Payer: COMMERCIAL

## 2023-01-18 PROCEDURE — 97110 THERAPEUTIC EXERCISES: CPT

## 2023-01-18 PROCEDURE — 97140 MANUAL THERAPY 1/> REGIONS: CPT

## 2023-01-18 PROCEDURE — 97161 PT EVAL LOW COMPLEX 20 MIN: CPT

## 2023-01-20 ENCOUNTER — HOSPITAL ENCOUNTER (OUTPATIENT)
Dept: PHYSICAL THERAPY | Age: 83
Setting detail: THERAPIES SERIES
Discharge: HOME | End: 2023-01-20
Payer: COMMERCIAL

## 2023-01-20 PROCEDURE — 97110 THERAPEUTIC EXERCISES: CPT

## 2023-01-20 PROCEDURE — 97140 MANUAL THERAPY 1/> REGIONS: CPT

## 2023-01-20 NOTE — FLOWSHEET NOTE
[facility] [place]  Phone: [phone]   Fax:     [phone]        Physical Therapy Treatment Note/ Progress Report:       Date:  2023    Patient Name:  Yesenia Henry    :  1940  MRN: [id number]    Pertinent Medical History:     Medical/Treatment Diagnosis Information:  Medical Diagnosis: Adhesive capsulitis of right shoulder [M75.01]  Treatment Diagnosis: Bilateral shoulder hypomobility limiting tolerance to adl's    Insurance/Certification information:  PT Insurance Information: [id number]  Physician Information:  Zita Simon MD  Plan of care signed (Y/N): routed 23    Date of Patient follow up with Physician:      Progress Report: []  Yes  [x]  No     Date Range for reporting period:  Beginnin2023  Ending:      Progress report due (10 Rx/or 30 days whichever is less): 7/12/15     Recertification due (POC duration/ or 90 days whichever is less):      Visit # POC/Insurance Allowable Auth Needed   2 UHC/ DED NOT MET/ NO COPAY/ NO AUTH/ []Yes    [x]No     Functional Outcomes Measure:   Date Assessed:  Test: FOTO  Score:     Pain level:  2-7/10     History of Injury: Patient stated complaint:  right shoulder pain and stiffness began ~ 2 weeks ago insidiously , c/o pain in multiple other joints including left shoulder , denies previous h/o injury , C/O CONSTANT  bilat. Shoulder pain, denies radiating pain, PARESTHESIA - DENIES ANY, INCREASED PAIN  with toilet hygiene , dressing, lying on either shoulder ,  DECREASED PAIN  with relative rest, Advi and heat , WORK  he is a retired  , ACTIVITIES  include walking    SUBJECTIVE:    23 Pt reports soreness in shoulder last night.      OBJECTIVE:   Observation:   Test measurements:      RESTRICTIONS/PRECAUTIONS:     Exercises/Interventions:   Therapeutic Ex (13798)  Min: Resistance/Reps Cues/Notes   OH pulley ADD>>    Wall slides  5\" 10 x L/R ea  Gume. well   Wall sleep stretch  10\" x 6 L/R ea  Gume. Well                        HEP 1/20 reviewed from 1/18/23  Corrected tech. W/ table flexion , scaption and ER for left and R shoulders    Manual Intervention  (50386)  Min:25     GHJ mobs Bilat. Shoulders long axis and  caudal post. Gr 4     PROM  Bilat. Shoulder ER, IR and  scaption                         NMR re-education (76464)  Min:     Scap stab. T slide   Corner push outs      RC activation at end range   SL'ing ER  T slide                     Therapeutic Activity (28415)  Min:               Modalities:  Min                 Other Therapeutic Activities:  1/18/23 Pt was educated on PT POC, Diagnosis, Prognosis, pathomechanics as well as frequency and duration of scheduling future physical therapy appointments. Time was also taken on this day to answer all patient questions and participation in PT. Reviewed appointment policy in detail with patient and patient verbalized understanding. Home Exercise Program:  HEP instruction: Access Code: [email or web address]  URL: [email or web address]  Date: 01/18/2023  Prepared by: Rosmery Sosa     Exercises  Seated Shoulder Flexion Towel Slide at Table Top - 2-3 x daily - 7 x weekly - 1-2 sets - 10 reps - 5-10 hold  Seated Shoulder Abduction Towel Slide at Table Top - 2-3 x daily - 7 x weekly - 1-2 sets - 10 reps - 5-10 hold  Seated Shoulder External Rotation PROM on Table - 2-3 x daily - 7 x weekly - 1-2 sets - 10 reps - 5-10 hold  The patient demonstrated good tolerance to and understanding of the HEP. Written instructions have been issued.     Therapeutic Exercise and NMR EXR  [] (35599) Provided verbal/tactile cueing for activities related to strengthening, flexibility, endurance, ROM  for improvements in scapular, scapulothoracic and UE control with self care, reaching, carrying, lifting, house/yardwork, driving/computer work.    [] (76464) Provided verbal/tactile cueing for activities related to improving balance, coordination, kinesthetic sense, posture, motor skill, proprioception  to assist with  scapular, scapulothoracic and UE control with self care, reaching, carrying, lifting, house/yardwork, driving/computer work. Therapeutic Activities:    [] (44644 or 37084) Provided verbal/tactile cueing for activities related to improving balance, coordination, kinesthetic sense, posture, motor skill, proprioception and motor activation to allow for proper function of scapular, scapulothoracic and UE control with self care, carrying, lifting, driving/computer work.      Home Exercise Program:    [x] (57360) Reviewed/Progressed HEP activities related to strengthening, flexibility, endurance, ROM of scapular, scapulothoracic and UE control with self care, reaching, carrying, lifting, house/yardwork, driving/computer work  [] (49045) Reviewed/Progressed HEP activities related to improving balance, coordination, kinesthetic sense, posture, motor skill, proprioception of scapular, scapulothoracic and UE control with self care, reaching, carrying, lifting, house/yardwork, driving/computer work      Manual Treatments:  PROM / STM / Oscillations-Mobs:  G-I, II, III, IV (PA's, Inf., Post.)  [] (43844) Provided manual therapy to mobilize soft tissue/joints of cervical/CT, scapular GHJ and UE for the purpose of modulating pain, promoting relaxation,  increasing ROM, reducing/eliminating soft tissue swelling/inflammation/restriction, improving soft tissue extensibility and allowing for proper ROM for normal function with self care, reaching, carrying, lifting, house/yardwork, driving/computer work    Charges:  Timed Code Treatment Minutes: 45   Total Treatment Minutes: 50       [] EVAL (LOW) 99947 (typically 20 minutes face-to-face)  [] EVAL (MOD) 72081 (typically 30 minutes face-to-face)  [] EVAL (HIGH) 81088 (typically 45 minutes face-to-face)  [] RE-EVAL     [x] WB(49269) x     [] Dry needle 1 or 2 Muscles (38387)  [] NMR (69300) x     [] Dry needle 3+ Muscles (03854)  [x] Manual (23661) x 2    [] Ultrasound (46838) x  [] TA (91131) x     [] Mech Traction (28247)  [] ES(attended) (68476)     [] ES (un) (89918):   [] Vasopump (34114) [] Ionto (66319)   [] Other:    If [facility] Please Indicate Time In/Out  CPT Code Time in Time out                                   Approval Dates:  CPT Code Units Approved Units Used  Date Updated:                     Luis Wright stated goal: sleep better and improve mobility   [] Progressing: [] Met: [] Not Met: [] Adjusted     Therapist goals for Patient:   Short Term Goals: To be achieved in: 2 weeks  1. Independent in HEP and progression per patient tolerance, in order to prevent re-injury. [] Progressing: [] Met: [] Not Met: [] Adjusted  2. Patient will have a decrease in pain 5/10 to facilitate improvement in movement, function, and ADLs as indicated by Functional Deficits. [] Progressing: [] Met: [] Not Met: [] Adjusted     Long Term Goals: To be achieved in: 4-6 weeks  1. Increase FOTO functional outcome score from 69 to 75  to assist with reaching prior level of function. [] Progressing: [] Met: [] Not Met: [] Adjusted  2. Patient will demonstrate increased AROM to to wfl's at bilateral shoulders  to allow for proper joint functioning as indicated by Functional Deficits. [] Progressing: [] Met: [] Not Met: [] Adjusted  3. Patient will demonstrate an increase in NM recruitment/activation and overall GH and scapular strength to within n5lbs HHD or WNL for proper functional mobility as indicated by patients Functional Deficits. [] Progressing: [] Met: [] Not Met: [] Adjusted  4. Patient will return to reaching for hygiene, dressing and household   activities without increased symptoms or restriction. [] Progressing: [] Met: [] Not Met: [] Adjusted  5.  Patient will have a decrease in pain 0-2/10 to facilitate improvement in movement, function, and ADLs as indicated by Functional Deficits. [] Progressing: [] Met: [] Not Met: [] Adjusted    ASSESSMENT:    1/20 improved Bilat. GHj mob. Post rx    Treatment/Activity Tolerance:  [x] Patient tolerated treatment well [] Patient limited by fatique  [] Patient limited by pain  [] Patient limited by other medical complications  [] Other:     Overall Progression Towards Functional goals/ Treatment Progress Update:  [x] Patient is progressing as expected towards functional goals listed. [] Progression is slowed due to complexities/Impairments listed. [] Progression has been slowed due to co-morbidities. [] Plan just implemented, too soon to assess goals progression <30days   [] Goals require adjustment due to lack of progress  [] Patient is not progressing as expected and requires additional follow up with physician  [] Other    Prognosis for POC: [x] Good [] Fair  [] Poor    Patient requires continued skilled intervention: [x] Yes  [] No      PLAN:   ADD wall slides and wall sleeper to HEP   Recover rom and function at bilateral shoulders focus  L>R due to greater limitations , cont. As above to facilitate static and dynamic control at shoulders    [] Continue per plan of care [] Alter current plan (see comments)  [x] Plan of care initiated [] Hold pending MD visit [] Discharge    Electronically signed by: Carissa Mueller PT     Note: If patient does not return for scheduled/recommended follow up visits, this note will serve as a discharge from care along with the most recent update on progress.

## 2023-01-23 ENCOUNTER — HOSPITAL ENCOUNTER (OUTPATIENT)
Dept: PHYSICAL THERAPY | Age: 83
Setting detail: THERAPIES SERIES
Discharge: HOME | End: 2023-01-23
Payer: COMMERCIAL

## 2023-01-23 ENCOUNTER — OFFICE VISIT (OUTPATIENT)
Dept: PULMONOLOGY | Age: 83
End: 2023-01-23

## 2023-01-23 VITALS
TEMPERATURE: 97.1 F | HEART RATE: 88 BPM | BODY MASS INDEX: 31.64 KG/M2 | OXYGEN SATURATION: 99 % | DIASTOLIC BLOOD PRESSURE: 70 MMHG | RESPIRATION RATE: 21 BRPM | HEIGHT: 67 IN | SYSTOLIC BLOOD PRESSURE: 115 MMHG | WEIGHT: 201.6 LBS

## 2023-01-23 DIAGNOSIS — R93.89 ABNORMAL CT OF THE CHEST: ICD-10-CM

## 2023-01-23 DIAGNOSIS — R91.8 PULMONARY NODULES: Primary | ICD-10-CM

## 2023-01-23 DIAGNOSIS — J92.0 PLEURAL PLAQUE DUE TO ASBESTOS EXPOSURE: ICD-10-CM

## 2023-01-23 PROCEDURE — 97140 MANUAL THERAPY 1/> REGIONS: CPT

## 2023-01-23 PROCEDURE — 97110 THERAPEUTIC EXERCISES: CPT

## 2023-01-23 NOTE — PROGRESS NOTES
Chief complaint  This is a [de-identified]y.o. year old male  who comes to see me with a chief complaint of   Chief Complaint   Patient presents with    Follow-up     Nodule          HPI  Here with a cc of lung nodule, abnormal CT chest     He says his breathing is doing ok. No changes that he is aware of. Dealing more with shoulder pains bilaterally. He was told he may have frozen shoulder and was started in PT>  He does not feel that he is making much progress and is still dealing with pain in the shoulders. No injury that he is aware of. Otherwise everything seems to be ok         Current Outpatient Medications:     sodium chloride (ALTAMIST SPRAY) 0.65 % nasal spray, 1 spray by Nasal route as needed for Congestion, Disp: 1 each, Rfl: 3    cetirizine (ZYRTEC) 5 MG tablet, Take 1 tablet by mouth daily, Disp: 30 tablet, Rfl: 0    allopurinol (ZYLOPRIM) 300 MG tablet, TAKE ONE TABLET BY MOUTH DAILY, Disp: 90 tablet, Rfl: 3    lisinopril-hydroCHLOROthiazide (PRINZIDE;ZESTORETIC) 20-12.5 MG per tablet, TAKE TWO TABLETS BY MOUTH DAILY, Disp: 90 tablet, Rfl: 2    ezetimibe-simvastatin (VYTORIN) 10-40 MG per tablet, TAKE ONE TABLET BY MOUTH DAILY, Disp: 90 tablet, Rfl: 2    tamsulosin (FLOMAX) 0.4 MG capsule, Take 1 capsule by mouth daily, Disp: 90 capsule, Rfl: 1    hydrocortisone (ANUSOL-HC) 2.5 % CREA rectal cream, To be applied locally. , Disp: 28 g, Rfl: 5    Saw Palmetto 450 MG CAPS, Take by mouth, Disp: , Rfl:     EPINEPHrine (EPIPEN 2-BESS) 0.3 MG/0.3ML SOAJ injection, Use as directed for allergic reaction, Disp: 1 each, Rfl: 0    azelastine (ASTELIN) 0.1 % nasal spray, 2 puffs to nose tid as needed, Disp: 1 each, Rfl: 0    hydrocortisone (LOCOID) 0.1 % OINT oitment, APPLY UP TO TWICE A DAY AS NEEDED FOR RASH, Disp: 45 g, Rfl: 0    Omega-3 Fatty Acids (FISH OIL) 1000 MG CAPS, Take 1,000 mg by mouth 2 times daily, Disp: , Rfl:       PHYSICAL EXAM:  Vitals:    01/23/23 0831   BP: 115/70   Pulse: 88   Resp: 21   Temp: 97.1 °F (36.2 °C)   SpO2: 99%         GENERAL:  Well nourished, alert, appears stated age, no distress  HEENT:  No scleral icterus, no conjunctival irritation  NECK:  No thyromegaly, no bruits  LYMPH:  No cervical or supraclavicular adenopathy  HEART:  Regular rate and rhythm, + murmur  LUNGS:  Crackles in bases   ABDOMEN:  No distention, no organomegaly  EXTREMITIES:  No edema, no digital clubbing  NEURO:  No localizing deficits, CN II-XII intact    Pulmonary Function Testing 8/2020  Mild reduction in DLCO    Chest imaging from 4/22 is reviewed. My interpretation is pleural plaques, calcifications, scarring, reticular markings, small LLL nodule vs scarring      I reviewed above labs and studies pertinent to this visit and date    Assessment/Plan:  1. Pulmonary nodules  Small and likely scars. Stable on imaging. CT due April  - CT CHEST WO CONTRAST; Future    2. 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 CT April  - CT CHEST WO CONTRAST; Future    3. Abnormal CT of the chest  Mild UIP, nodules, and plaques. Non progressive symptoms which may mean this is all related to previous asbestos exposures.   Imaging has been stable and he is due to CT April        Follow up in 6 months

## 2023-01-23 NOTE — FLOWSHEET NOTE
[facility] [place]. [address]  Phone: [phone]   Fax:     [phone]        Physical Therapy Treatment Note/ Progress Report:       Date:  2023    Patient Name:  Genet Sneed    :  1940  MRN: [id number]    Pertinent Medical History:     Medical/Treatment Diagnosis Information:  Medical Diagnosis: Adhesive capsulitis of right shoulder [M75.01]  Treatment Diagnosis: Bilateral shoulder hypomobility limiting tolerance to adl's    Insurance/Certification information:  PT Insurance Information: [id number]  Physician Information:  Rock Hull MD  Plan of care signed (Y/N): routed 23    Date of Patient follow up with Physician:      Progress Report: []  Yes  [x]  No     Date Range for reporting period:  Beginnin2023  Ending:      Progress report due (10 Rx/or 30 days whichever is less):      Recertification due (POC duration/ or 90 days whichever is less):      Visit # POC/Insurance Allowable Auth Needed   3 UHC/ DED NOT MET/ NO COPAY/ NO AUTH/ []Yes    [x]No     Functional Outcomes Measure:   Date Assessed:  Test: FOTO  Score:     Pain level:  5-6/10     History of Injury: Patient stated complaint:  right shoulder pain and stiffness began ~ 2 weeks ago insidiously , c/o pain in multiple other joints including left shoulder , denies previous h/o injury , C/O CONSTANT  bilat. Shoulder pain, denies radiating pain, PARESTHESIA - DENIES ANY, INCREASED PAIN  with toilet hygiene , dressing, lying on either shoulder ,  DECREASED PAIN  with relative rest, Advi and heat , WORK  he is a retired  , ACTIVITIES  include walking    SUBJECTIVE:    23 Pt reports soreness in shoulder last night. 23 Patient reports both shoulders has been more sore,kept him up at night.      OBJECTIVE:   Observation:   Test measurements: RESTRICTIONS/PRECAUTIONS:     Exercises/Interventions:   Therapeutic Ex (54861)  Min:15 Resistance/Reps Cues/Notes   OH pulley 2  min    Wall slides  5\" 10 x L/R ea  Gume. well   Wall sleep stretch  Gume. Well    UBE 2' each                   HEP 1/20 reviewed from 1/18/23  Corrected tech. W/ table flexion , scaption and ER for left and R shoulders    Manual Intervention  (68099)  Min:30     GHJ mobs Bilat. Shoulders long axis and  caudal post. Gr 4     PROM  Bilat. Shoulder ER, IR and  scaption     STM Pecs,infraspinatus                   NMR re-education (71021)  Min:     Scap stab. T slide   Corner push outs      RC activation at end range   SL'ing ER  T slide                     Therapeutic Activity (65832)  Min:               Modalities:  Min                 Other Therapeutic Activities:  1/18/23 Pt was educated on PT POC, Diagnosis, Prognosis, pathomechanics as well as frequency and duration of scheduling future physical therapy appointments. Time was also taken on this day to answer all patient questions and participation in PT. Reviewed appointment policy in detail with patient and patient verbalized understanding. Home Exercise Program:  HEP instruction: Access Code: [email or web address]  URL: [email or web address]  Date: 01/18/2023  Prepared by: Ronald Higgins     Exercises  Seated Shoulder Flexion Towel Slide at Table Top - 2-3 x daily - 7 x weekly - 1-2 sets - 10 reps - 5-10 hold  Seated Shoulder Abduction Towel Slide at Table Top - 2-3 x daily - 7 x weekly - 1-2 sets - 10 reps - 5-10 hold  Seated Shoulder External Rotation PROM on Table - 2-3 x daily - 7 x weekly - 1-2 sets - 10 reps - 5-10 hold  The patient demonstrated good tolerance to and understanding of the HEP. Written instructions have been issued.     Therapeutic Exercise and NMR EXR  [] (12830) Provided verbal/tactile cueing for activities related to strengthening, flexibility, endurance, ROM  for improvements in scapular, scapulothoracic and UE control with self care, reaching, carrying, lifting, house/yardwork, driving/computer work.    [] (57248) Provided verbal/tactile cueing for activities related to improving balance, coordination, kinesthetic sense, posture, motor skill, proprioception  to assist with  scapular, scapulothoracic and UE control with self care, reaching, carrying, lifting, house/yardwork, driving/computer work. Therapeutic Activities:    [] (41949 or 12621) Provided verbal/tactile cueing for activities related to improving balance, coordination, kinesthetic sense, posture, motor skill, proprioception and motor activation to allow for proper function of scapular, scapulothoracic and UE control with self care, carrying, lifting, driving/computer work.      Home Exercise Program:    [x] (87026) Reviewed/Progressed HEP activities related to strengthening, flexibility, endurance, ROM of scapular, scapulothoracic and UE control with self care, reaching, carrying, lifting, house/yardwork, driving/computer work  [] (02991) Reviewed/Progressed HEP activities related to improving balance, coordination, kinesthetic sense, posture, motor skill, proprioception of scapular, scapulothoracic and UE control with self care, reaching, carrying, lifting, house/yardwork, driving/computer work      Manual Treatments:  PROM / STM / Oscillations-Mobs:  G-I, II, III, IV (PA's, Inf., Post.)  [] (69426) Provided manual therapy to mobilize soft tissue/joints of cervical/CT, scapular GHJ and UE for the purpose of modulating pain, promoting relaxation,  increasing ROM, reducing/eliminating soft tissue swelling/inflammation/restriction, improving soft tissue extensibility and allowing for proper ROM for normal function with self care, reaching, carrying, lifting, house/yardwork, driving/computer work    Charges:  Timed Code Treatment Minutes: 45   Total Treatment Minutes: 45       [] EVAL (LOW) 39092 (typically 20 minutes face-to-face)  [] EVAL (MOD) 04013 (typically 30 minutes face-to-face)  [] EVAL (HIGH) 48119 (typically 45 minutes face-to-face)  [] RE-EVAL     [x] QP(36746) x     [] Dry needle 1 or 2 Muscles (80402)  [] NMR (07139) x     [] Dry needle 3+ Muscles (20696)  [x] Manual (41952) x 2    [] Ultrasound (58451) x  [] TA (13989) x     [] Mech Traction (33783)  [] ES(attended) (67573)     [] ES (un) (38673):   [] Vasopump (60507) [] Ionto (00087)   [] Other:    If [facility] Please Indicate Time In/Out  CPT Code Time in Time out                                   Approval Dates:  CPT Code Units Approved Units Used  Date Updated:                     Nikki Joseph stated goal: sleep better and improve mobility   [] Progressing: [] Met: [] Not Met: [] Adjusted     Therapist goals for Patient:   Short Term Goals: To be achieved in: 2 weeks  1. Independent in HEP and progression per patient tolerance, in order to prevent re-injury. [] Progressing: [] Met: [] Not Met: [] Adjusted  2. Patient will have a decrease in pain 5/10 to facilitate improvement in movement, function, and ADLs as indicated by Functional Deficits. [] Progressing: [] Met: [] Not Met: [] Adjusted     Long Term Goals: To be achieved in: 4-6 weeks  1. Increase FOTO functional outcome score from 69 to 75  to assist with reaching prior level of function. [] Progressing: [] Met: [] Not Met: [] Adjusted  2. Patient will demonstrate increased AROM to to wfl's at bilateral shoulders  to allow for proper joint functioning as indicated by Functional Deficits. [] Progressing: [] Met: [] Not Met: [] Adjusted  3. Patient will demonstrate an increase in NM recruitment/activation and overall GH and scapular strength to within n5lbs HHD or WNL for proper functional mobility as indicated by patients Functional Deficits. [] Progressing: [] Met: [] Not Met: [] Adjusted  4. Patient will return to reaching for hygiene, dressing and household   activities without increased symptoms or restriction.    [] Progressing: [] Met: [] Not Met: [] Adjusted  5. Patient will have a decrease in pain 0-2/10 to facilitate improvement in movement, function, and ADLs as indicated by Functional Deficits. [] Progressing: [] Met: [] Not Met: [] Adjusted    ASSESSMENT:    1/20 improved Bilat. GHj mob. Post rx  01/23/23 Tolerated treatment well,ER still a painful,TTP B pecs,infraspinatus. Treatment/Activity Tolerance:  [x] Patient tolerated treatment well [] Patient limited by fatique  [] Patient limited by pain  [] Patient limited by other medical complications  [] Other:     Overall Progression Towards Functional goals/ Treatment Progress Update:  [x] Patient is progressing as expected towards functional goals listed. [] Progression is slowed due to complexities/Impairments listed. [] Progression has been slowed due to co-morbidities. [] Plan just implemented, too soon to assess goals progression <30days   [] Goals require adjustment due to lack of progress  [] Patient is not progressing as expected and requires additional follow up with physician  [] Other    Prognosis for POC: [x] Good [] Fair  [] Poor    Patient requires continued skilled intervention: [x] Yes  [] No      PLAN:   ADD wall slides and wall sleeper to HEP   Recover rom and function at bilateral shoulders focus  L>R due to greater limitations , cont. As above to facilitate static and dynamic control at shoulders    [] Continue per plan of care [] Alter current plan (see comments)  [x] Plan of care initiated [] Hold pending MD visit [] Discharge    Electronically signed by: Miranda Nielson PTA     Note: If patient does not return for scheduled/recommended follow up visits, this note will serve as a discharge from care along with the most recent update on progress.

## 2023-01-25 ENCOUNTER — HOSPITAL ENCOUNTER (OUTPATIENT)
Dept: PHYSICAL THERAPY | Age: 83
Setting detail: THERAPIES SERIES
Discharge: HOME | End: 2023-01-25
Payer: COMMERCIAL

## 2023-01-25 PROCEDURE — 97140 MANUAL THERAPY 1/> REGIONS: CPT

## 2023-01-25 PROCEDURE — 97110 THERAPEUTIC EXERCISES: CPT

## 2023-01-25 NOTE — FLOWSHEET NOTE
[facility] [place]  Phone: [phone]   Fax:     [phone]        Physical Therapy Treatment Note/ Progress Report:       Date:  2023    Patient Name:  Julien Jean    :  1940  MRN: [id number]    Pertinent Medical History:     Medical/Treatment Diagnosis Information:  Medical Diagnosis: Adhesive capsulitis of right shoulder [M75.01]  Treatment Diagnosis: Bilateral shoulder hypomobility limiting tolerance to adl's    Insurance/Certification information:  PT Insurance Information: [id number]  Physician Information:  Nasreen Espana MD  Plan of care signed (Y/N): routed 23    Date of Patient follow up with Physician:      Progress Report: []  Yes  [x]  No     Date Range for reporting period:  Beginnin2023  Ending:      Progress report due (10 Rx/or 30 days whichever is less):      Recertification due (POC duration/ or 90 days whichever is less):      Visit # POC/Insurance Allowable Auth Needed   4 UHC/ DED NOT MET/ NO COPAY/ NO AUTH/ []Yes    [x]No     Functional Outcomes Measure:   Date Assessed:  Test: FOTO  Score:     Pain level:  4-5/10     History of Injury: Patient stated complaint:  right shoulder pain and stiffness began ~ 2 weeks ago insidiously , c/o pain in multiple other joints including left shoulder , denies previous h/o injury , C/O CONSTANT  bilat. Shoulder pain, denies radiating pain, PARESTHESIA - DENIES ANY, INCREASED PAIN  with toilet hygiene , dressing, lying on either shoulder ,  DECREASED PAIN  with relative rest, Advi and heat , WORK  he is a retired  , ACTIVITIES  include walking    SUBJECTIVE:    23 Pt reports soreness in shoulder last night. 23 Patient reports both shoulders has been more sore,kept him up at night.   23 Patient reports shoulders are feeling a little looser he was able to sleep better last night. OBJECTIVE:   Observation:   Test measurements:      RESTRICTIONS/PRECAUTIONS:     Exercises/Interventions:   Therapeutic Ex (53544)  Min:10 Resistance/Reps Cues/Notes   OH pulley 2  min    Wall slides  5\" 10 x L/R ea  Gume. well   Wall sleep stretch  Gume. Well    UBE 2' each                   HEP 1/20 reviewed from 1/18/23  Corrected tech. W/ table flexion , scaption and ER for left and R shoulders    Manual Intervention  (87414)  Min:30     GHJ mobs Bilat. Shoulders long axis and  caudal post. Gr 4     PROM  Bilat. Shoulder ER, IR and  scaption     STM Pecs,infraspinatus                   NMR re-education (31089)  Min: 5     Scap stab. T slide rows  T-slide B ext   Corner push outs    Green 15 x 2   Green 15 x 2     RC activation at end range   SL'ing ER  T slide                     Therapeutic Activity (05367)  Min:               Modalities:  Min                 Other Therapeutic Activities:  1/18/23 Pt was educated on PT POC, Diagnosis, Prognosis, pathomechanics as well as frequency and duration of scheduling future physical therapy appointments. Time was also taken on this day to answer all patient questions and participation in PT. Reviewed appointment policy in detail with patient and patient verbalized understanding. Home Exercise Program:  HEP instruction: Access Code: [email or web address]  URL: [email or web address]  Date: 01/18/2023  Prepared by: Dov Baez     Exercises  Seated Shoulder Flexion Towel Slide at Table Top - 2-3 x daily - 7 x weekly - 1-2 sets - 10 reps - 5-10 hold  Seated Shoulder Abduction Towel Slide at Table Top - 2-3 x daily - 7 x weekly - 1-2 sets - 10 reps - 5-10 hold  Seated Shoulder External Rotation PROM on Table - 2-3 x daily - 7 x weekly - 1-2 sets - 10 reps - 5-10 hold  The patient demonstrated good tolerance to and understanding of the HEP. Written instructions have been issued. Access Code: [email or web address]  URL: [email or web address]  Date: 01/25/2023  Prepared by: Felicity Iverson    Exercises  Standing shoulder flexion wall slides - 1 x daily - 7 x weekly - 3 sets - 10 reps - 5 hold    Therapeutic Exercise and NMR EXR  [] (00495) Provided verbal/tactile cueing for activities related to strengthening, flexibility, endurance, ROM  for improvements in scapular, scapulothoracic and UE control with self care, reaching, carrying, lifting, house/yardwork, driving/computer work.    [] (94667) Provided verbal/tactile cueing for activities related to improving balance, coordination, kinesthetic sense, posture, motor skill, proprioception  to assist with  scapular, scapulothoracic and UE control with self care, reaching, carrying, lifting, house/yardwork, driving/computer work. Therapeutic Activities:    [] (06072 or 45412) Provided verbal/tactile cueing for activities related to improving balance, coordination, kinesthetic sense, posture, motor skill, proprioception and motor activation to allow for proper function of scapular, scapulothoracic and UE control with self care, carrying, lifting, driving/computer work.      Home Exercise Program:    [x] (40486) Reviewed/Progressed HEP activities related to strengthening, flexibility, endurance, ROM of scapular, scapulothoracic and UE control with self care, reaching, carrying, lifting, house/yardwork, driving/computer work  [] (49342) Reviewed/Progressed HEP activities related to improving balance, coordination, kinesthetic sense, posture, motor skill, proprioception of scapular, scapulothoracic and UE control with self care, reaching, carrying, lifting, house/yardwork, driving/computer work      Manual Treatments:  PROM / STM / Oscillations-Mobs:  G-I, II, III, IV (PA's, Inf., Post.)  [] (42015) Provided manual therapy to mobilize soft tissue/joints of cervical/CT, scapular GHJ and UE for the purpose of modulating pain, promoting relaxation,  increasing ROM, reducing/eliminating soft tissue swelling/inflammation/restriction, improving soft tissue extensibility and allowing for proper ROM for normal function with self care, reaching, carrying, lifting, house/yardwork, driving/computer work    Charges:  Timed Code Treatment Minutes: 40   Total Treatment Minutes: 45       [] EVAL (LOW) 06003 (typically 20 minutes face-to-face)  [] EVAL (MOD) 54405 (typically 30 minutes face-to-face)  [] EVAL (HIGH) 99701 (typically 45 minutes face-to-face)  [] RE-EVAL     [x] LO(61295) x     [] Dry needle 1 or 2 Muscles (92430)  [] NMR (40317) x     [] Dry needle 3+ Muscles (98104)  [x] Manual (69904) x 2    [] Ultrasound (61707) x  [] TA (50005) x     [] Mech Traction (36994)  [] ES(attended) (01651)     [] ES (un) (00812):   [] Vasopump (88155) [] Ionto (71111)   [] Other:    If [facility] Please Indicate Time In/Out  CPT Code Time in Time out                                   Approval Dates:  CPT Code Units Approved Units Used  Date Updated:                     Josh Arizmendi stated goal: sleep better and improve mobility   [] Progressing: [] Met: [] Not Met: [] Adjusted     Therapist goals for Patient:   Short Term Goals: To be achieved in: 2 weeks  1. Independent in HEP and progression per patient tolerance, in order to prevent re-injury. [] Progressing: [] Met: [] Not Met: [] Adjusted  2. Patient will have a decrease in pain 5/10 to facilitate improvement in movement, function, and ADLs as indicated by Functional Deficits. [] Progressing: [] Met: [] Not Met: [] Adjusted     Long Term Goals: To be achieved in: 4-6 weeks  1. Increase FOTO functional outcome score from 69 to 75  to assist with reaching prior level of function. [] Progressing: [] Met: [] Not Met: [] Adjusted  2. Patient will demonstrate increased AROM to to wfl's at bilateral shoulders  to allow for proper joint functioning as indicated by Functional Deficits. [] Progressing: [] Met: [] Not Met: [] Adjusted  3.  Patient will demonstrate an increase in NM recruitment/activation and overall GH and scapular strength to within n5lbs HHD or WNL for proper functional mobility as indicated by patients Functional Deficits. [] Progressing: [] Met: [] Not Met: [] Adjusted  4. Patient will return to reaching for hygiene, dressing and household   activities without increased symptoms or restriction. [] Progressing: [] Met: [] Not Met: [] Adjusted  5. Patient will have a decrease in pain 0-2/10 to facilitate improvement in movement, function, and ADLs as indicated by Functional Deficits. [] Progressing: [] Met: [] Not Met: [] Adjusted    ASSESSMENT:    1/20 improved Bilat. GHj mob. Post rx  01/23/23 Tolerated treatment well,ER still a painful,TTP B pecs,infraspinatus. 01/25/23 Tolerated treatment well,less guarding with ER ROM. Treatment/Activity Tolerance:  [x] Patient tolerated treatment well [] Patient limited by fatique  [] Patient limited by pain  [] Patient limited by other medical complications  [] Other:     Overall Progression Towards Functional goals/ Treatment Progress Update:  [x] Patient is progressing as expected towards functional goals listed. [] Progression is slowed due to complexities/Impairments listed. [] Progression has been slowed due to co-morbidities. [] Plan just implemented, too soon to assess goals progression <30days   [] Goals require adjustment due to lack of progress  [] Patient is not progressing as expected and requires additional follow up with physician  [] Other    Prognosis for POC: [x] Good [] Fair  [] Poor    Patient requires continued skilled intervention: [x] Yes  [] No      PLAN:   ADD wall slides and wall sleeper to HEP   Recover rom and function at bilateral shoulders focus  L>R due to greater limitations , cont.  As above to facilitate static and dynamic control at shoulders    [] Continue per plan of care [] Alter current plan (see comments)  [x] Plan of care initiated [] Hold pending MD visit [] Discharge    Electronically signed by: Jonel Funk PTA     Note: If patient does not return for scheduled/recommended follow up visits, this note will serve as a discharge from care along with the most recent update on progress.

## 2023-02-01 ENCOUNTER — HOSPITAL ENCOUNTER (OUTPATIENT)
Dept: PHYSICAL THERAPY | Age: 83
Setting detail: THERAPIES SERIES
Discharge: HOME | End: 2023-02-01
Payer: COMMERCIAL

## 2023-02-01 PROCEDURE — 97140 MANUAL THERAPY 1/> REGIONS: CPT

## 2023-02-01 PROCEDURE — 97112 NEUROMUSCULAR REEDUCATION: CPT

## 2023-02-01 NOTE — FLOWSHEET NOTE
[facility] [place]  Phone: [phone]   Fax:     [phone]        Physical Therapy Treatment Note/ Progress Report:       Date:  2023    Patient Name:  Silva Burroughs    :  1940  MRN: [id number]    Pertinent Medical History:     Medical/Treatment Diagnosis Information:  Medical Diagnosis: Adhesive capsulitis of right shoulder [M75.01]  Treatment Diagnosis: Bilateral shoulder hypomobility limiting tolerance to adl's    Insurance/Certification information:  PT Insurance Information: [id number]  Physician Information:  Elie Collet, MD  Plan of care signed (Y/N): routed 23    Date of Patient follow up with Physician:      Progress Report: []  Yes  [x]  No     Date Range for reporting period:  Beginnin2023  Ending:      Progress report due (10 Rx/or 30 days whichever is less):      Recertification due (POC duration/ or 90 days whichever is less):      Visit # POC/Insurance Allowable Auth Needed   5 UHC/ DED NOT MET/ NO COPAY/ NO AUTH/ []Yes    [x]No     Functional Outcomes Measure:   Date Assessed:  Test: FOTO  Score:     Pain level:  7/10     History of Injury: Patient stated complaint:  right shoulder pain and stiffness began ~ 2 weeks ago insidiously , c/o pain in multiple other joints including left shoulder , denies previous h/o injury , C/O CONSTANT  bilat. Shoulder pain, denies radiating pain, PARESTHESIA - DENIES ANY, INCREASED PAIN  with toilet hygiene , dressing, lying on either shoulder ,  DECREASED PAIN  with relative rest, Advi and heat , WORK  he is a retired  , ACTIVITIES  include walking    SUBJECTIVE:    23 Pt reports soreness in shoulder last night. 23 Patient reports both shoulders has been more sore,kept him up at night.   23 Patient reports shoulders are feeling a little looser he was able to sleep better last night. 2/1/23 Pt reports increased shoulder pain and difficulty sleeping at night. OBJECTIVE:   Observation:   Test measurements:      RESTRICTIONS/PRECAUTIONS:     Exercises/Interventions:   Therapeutic Ex (36289)  Min:2 Resistance/Reps Cues/Notes   OH pulley 2  min    Wall slides  Gume. well   Wall sleep stretch  Gume. Well    UBE                   HEP 2/1 advised pt to hold all HEP until 2/3     Manual Intervention  (69073)  Min:30     GHJ mobs Bilat. Shoulders long axis and  caudal post. Gr 4     PROM  Bilat. Shoulder ER, IR and  scaption     STM Pecs,infraspinatus                   NMR re-education (35216)  Min: 15     Scap stab. Corner push outs  T slide rows  Green 15 x 2   T     RC activation at end range      SL'ing ER 0# 10 x  and 1# 10 x 2   L/R ea   T slide  IR green 10 x 3 L/R ea                     Therapeutic Activity (78642)  Min:     dressing 2/1 advised pt dress with button up and zipper shirts and avoid pullovers due to pain w/ dressing, pt verbalized understanding          Modalities:  Min                 Other Therapeutic Activities:  1/18/23 Pt was educated on PT POC, Diagnosis, Prognosis, pathomechanics as well as frequency and duration of scheduling future physical therapy appointments. Time was also taken on this day to answer all patient questions and participation in PT. Reviewed appointment policy in detail with patient and patient verbalized understanding. Home Exercise Program:  HEP instruction: Access Code: [email or web address]  URL: [email or web address]  Date: 01/18/2023  Prepared by: April López     Exercises  Seated Shoulder Flexion Towel Slide at Table Top - 2-3 x daily - 7 x weekly - 1-2 sets - 10 reps - 5-10 hold  Seated Shoulder Abduction Towel Slide at Table Top - 2-3 x daily - 7 x weekly - 1-2 sets - 10 reps - 5-10 hold  Seated Shoulder External Rotation PROM on Table - 2-3 x daily - 7 x weekly - 1-2 sets - 10 reps - 5-10 hold  The patient demonstrated good tolerance to and understanding of the HEP. Written instructions have been issued. Access Code: [email or web address]  URL: [email or web address]  Date: 01/25/2023  Prepared by: Mariam Cason    Exercises  Standing shoulder flexion wall slides - 1 x daily - 7 x weekly - 3 sets - 10 reps - 5 hold    Therapeutic Exercise and NMR EXR  [] (82602) Provided verbal/tactile cueing for activities related to strengthening, flexibility, endurance, ROM  for improvements in scapular, scapulothoracic and UE control with self care, reaching, carrying, lifting, house/yardwork, driving/computer work.    [] (51215) Provided verbal/tactile cueing for activities related to improving balance, coordination, kinesthetic sense, posture, motor skill, proprioception  to assist with  scapular, scapulothoracic and UE control with self care, reaching, carrying, lifting, house/yardwork, driving/computer work. Therapeutic Activities:    [] (88688 or 00010) Provided verbal/tactile cueing for activities related to improving balance, coordination, kinesthetic sense, posture, motor skill, proprioception and motor activation to allow for proper function of scapular, scapulothoracic and UE control with self care, carrying, lifting, driving/computer work.      Home Exercise Program:    [x] (70445) Reviewed/Progressed HEP activities related to strengthening, flexibility, endurance, ROM of scapular, scapulothoracic and UE control with self care, reaching, carrying, lifting, house/yardwork, driving/computer work  [] (77773) Reviewed/Progressed HEP activities related to improving balance, coordination, kinesthetic sense, posture, motor skill, proprioception of scapular, scapulothoracic and UE control with self care, reaching, carrying, lifting, house/yardwork, driving/computer work      Manual Treatments:  PROM / STM / Oscillations-Mobs:  G-I, II, III, IV (PA's, Inf., Post.)  [] (34045) Provided manual therapy to mobilize soft tissue/joints of cervical/CT, scapular GHJ and UE for the purpose of modulating pain, promoting relaxation,  increasing ROM, reducing/eliminating soft tissue swelling/inflammation/restriction, improving soft tissue extensibility and allowing for proper ROM for normal function with self care, reaching, carrying, lifting, house/yardwork, driving/computer work    Charges:  Timed Code Treatment Minutes: 47   Total Treatment Minutes: 52       [] EVAL (LOW) 66926 (typically 20 minutes face-to-face)  [] EVAL (MOD) 89038 (typically 30 minutes face-to-face)  [] EVAL (HIGH) 64546 (typically 45 minutes face-to-face)  [] RE-EVAL     [] RI(66209) x     [] Dry needle 1 or 2 Muscles (92190)  [x] NMR (52987) x     [] Dry needle 3+ Muscles (36826)  [x] Manual (83902) x 2    [] Ultrasound (39287) x  [] TA (02715) x     [] Mech Traction (51289)  [] ES(attended) (75361)     [] ES (un) (90920):   [] Vasopump (51950) [] Ionto (15067)   [] Other:    If [facility] Please Indicate Time In/Out  CPT Code Time in Time out                                   Approval Dates:  CPT Code Units Approved Units Used  Date Updated:                     Daylin Francis stated goal: sleep better and improve mobility   [] Progressing: [] Met: [] Not Met: [] Adjusted     Therapist goals for Patient:   Short Term Goals: To be achieved in: 2 weeks  1. Independent in HEP and progression per patient tolerance, in order to prevent re-injury. [] Progressing: [] Met: [] Not Met: [] Adjusted  2. Patient will have a decrease in pain 5/10 to facilitate improvement in movement, function, and ADLs as indicated by Functional Deficits. [] Progressing: [] Met: [] Not Met: [] Adjusted     Long Term Goals: To be achieved in: 4-6 weeks  1. Increase FOTO functional outcome score from 69 to 75  to assist with reaching prior level of function. [] Progressing: [] Met: [] Not Met: [] Adjusted  2.  Patient will demonstrate increased AROM to to wfl's at bilateral shoulders  to allow for proper joint functioning as indicated by Functional Deficits. [] Progressing: [] Met: [] Not Met: [] Adjusted  3. Patient will demonstrate an increase in NM recruitment/activation and overall GH and scapular strength to within n5lbs HHD or WNL for proper functional mobility as indicated by patients Functional Deficits. [] Progressing: [] Met: [] Not Met: [] Adjusted  4. Patient will return to reaching for hygiene, dressing and household   activities without increased symptoms or restriction. [] Progressing: [] Met: [] Not Met: [] Adjusted  5. Patient will have a decrease in pain 0-2/10 to facilitate improvement in movement, function, and ADLs as indicated by Functional Deficits. [] Progressing: [] Met: [] Not Met: [] Adjusted    ASSESSMENT:    1/20 improved Bilat. GHj mob. Post rx  01/23/23 Tolerated treatment well,ER still a painful,TTP B pecs,infraspinatus. 01/25/23 Tolerated treatment well,less guarding with ER ROM. Treatment/Activity Tolerance:  [x] Patient tolerated treatment well [] Patient limited by fatique  [] Patient limited by pain  [] Patient limited by other medical complications  [] Other:     Overall Progression Towards Functional goals/ Treatment Progress Update:  [x] Patient is progressing as expected towards functional goals listed. [] Progression is slowed due to complexities/Impairments listed. [] Progression has been slowed due to co-morbidities.   [] Plan just implemented, too soon to assess goals progression <30days   [] Goals require adjustment due to lack of progress  [] Patient is not progressing as expected and requires additional follow up with physician  [] Other    Prognosis for POC: [x] Good [] Fair  [] Poor    Patient requires continued skilled intervention: [x] Yes  [] No      PLAN: Reassess/ consider a recommendation for anti inflammatory medications if need be  ADD wall slides and wall sleeper to HEP   Recover rom and function at bilateral shoulders focus  L>R due to greater limitations , cont. As above to facilitate static and dynamic control at shoulders    [] Continue per plan of care [] Alter current plan (see comments)  [x] Plan of care initiated [] Hold pending MD visit [] Discharge    Electronically signed by: Vipul Ferrera PT     Note: If patient does not return for scheduled/recommended follow up visits, this note will serve as a discharge from care along with the most recent update on progress.

## 2023-02-02 ENCOUNTER — TELEPHONE (OUTPATIENT)
Dept: INTERNAL MEDICINE CLINIC | Age: 83
End: 2023-02-02

## 2023-02-02 DIAGNOSIS — M25.512 ACUTE PAIN OF LEFT SHOULDER: Primary | ICD-10-CM

## 2023-02-02 RX ORDER — LIDOCAINE 50 MG/G
1 PATCH TOPICAL DAILY
Qty: 20 PATCH | Refills: 0 | Status: SHIPPED | OUTPATIENT
Start: 2023-02-02 | End: 2023-02-22

## 2023-02-02 NOTE — TELEPHONE ENCOUNTER
Called patient, spoke with him about his shoulder pain.   Will prescribe with some lidocaine patches and had made a referral to see orthopedics for possible steroidal injections for

## 2023-02-02 NOTE — TELEPHONE ENCOUNTER
His physical therapy has not helped if not worse.  Therapist suggested getting a injection in his shoulders (both) maybe needs further testing    Please call him to discuss [phone]

## 2023-02-03 ENCOUNTER — OFFICE VISIT (OUTPATIENT)
Dept: ORTHOPEDIC SURGERY | Age: 83
End: 2023-02-03

## 2023-02-03 ENCOUNTER — HOSPITAL ENCOUNTER (OUTPATIENT)
Dept: PHYSICAL THERAPY | Age: 83
Setting detail: THERAPIES SERIES
Discharge: HOME | End: 2023-02-03
Payer: COMMERCIAL

## 2023-02-03 VITALS — HEIGHT: 67 IN | BODY MASS INDEX: 31.55 KG/M2 | WEIGHT: 201 LBS

## 2023-02-03 DIAGNOSIS — M25.511 ACUTE PAIN OF BOTH SHOULDERS: ICD-10-CM

## 2023-02-03 DIAGNOSIS — M25.512 ACUTE PAIN OF BOTH SHOULDERS: ICD-10-CM

## 2023-02-03 DIAGNOSIS — M75.80 ROTATOR CUFF TENDINITIS, UNSPECIFIED LATERALITY: Primary | ICD-10-CM

## 2023-02-03 PROCEDURE — 97140 MANUAL THERAPY 1/> REGIONS: CPT

## 2023-02-03 PROCEDURE — 97112 NEUROMUSCULAR REEDUCATION: CPT

## 2023-02-03 RX ORDER — BUPIVACAINE HYDROCHLORIDE 5 MG/ML
3 INJECTION, SOLUTION PERINEURAL ONCE
Status: COMPLETED | OUTPATIENT
Start: 2023-02-03 | End: 2023-02-03

## 2023-02-03 RX ORDER — TRIAMCINOLONE ACETONIDE 40 MG/ML
80 INJECTION, SUSPENSION INTRA-ARTICULAR; INTRAMUSCULAR ONCE
Status: COMPLETED | OUTPATIENT
Start: 2023-02-03 | End: 2023-02-03

## 2023-02-03 RX ADMIN — TRIAMCINOLONE ACETONIDE 80 MG: 40 INJECTION, SUSPENSION INTRA-ARTICULAR; INTRAMUSCULAR at 08:40

## 2023-02-03 RX ADMIN — TRIAMCINOLONE ACETONIDE 80 MG: 40 INJECTION, SUSPENSION INTRA-ARTICULAR; INTRAMUSCULAR at 08:39

## 2023-02-03 RX ADMIN — BUPIVACAINE HYDROCHLORIDE 15 MG: 5 INJECTION, SOLUTION PERINEURAL at 08:39

## 2023-02-03 RX ADMIN — BUPIVACAINE HYDROCHLORIDE 15 MG: 5 INJECTION, SOLUTION PERINEURAL at 08:38

## 2023-02-03 NOTE — FLOWSHEET NOTE
[address] [facility] [place]. [address]  Phone: [phone]   Fax:     [phone]        Physical Therapy Treatment Note/ Progress Report:       Date:  2023    Patient Name:  David Zuniga    :  1940  MRN: [id number]    Pertinent Medical History:     Medical/Treatment Diagnosis Information:  Medical Diagnosis: Adhesive capsulitis of right shoulder [M75.01]  Treatment Diagnosis: Bilateral shoulder hypomobility limiting tolerance to adl's    Insurance/Certification information:  PT Insurance Information: [id number]  Physician Information:  Sandoval Mata MD  Plan of care signed (Y/N): routed 23    Date of Patient follow up with Physician:      Progress Report: []  Yes  [x]  No     Date Range for reporting period:  Beginnin/3/2023  Ending:      Progress report due (10 Rx/or 30 days whichever is less): 78     Recertification due (POC duration/ or 90 days whichever is less):      Visit # POC/Insurance Allowable Auth Needed   6 UHC/ DED NOT MET/ NO COPAY/ NO AUTH/ []Yes    [x]No     Functional Outcomes Measure:   Date Assessed:  Test: FOTO  Score:     Pain level:  8/10     History of Injury: Patient stated complaint:  right shoulder pain and stiffness began ~ 2 weeks ago insidiously , c/o pain in multiple other joints including left shoulder , denies previous h/o injury , C/O CONSTANT  bilat. Shoulder pain, denies radiating pain, PARESTHESIA - DENIES ANY, INCREASED PAIN  with toilet hygiene , dressing, lying on either shoulder ,  DECREASED PAIN  with relative rest, Advi and heat , WORK  he is a retired  , ACTIVITIES  include walking    SUBJECTIVE:    23 Pt reports soreness in shoulder last night. 23 Patient reports both shoulders has been more sore,kept him up at night.   23 Patient reports shoulders are feeling a little looser he was able to sleep better last night. 2/1/23 Pt reports increased shoulder pain and difficulty sleeping at night. 2/3/23 Pt reports increased pain this morning and he did have cortisone injections to each shoulder earlier this morning from Dr Laurel Lincoln. OBJECTIVE:   Observation:   Test measurements:      RESTRICTIONS/PRECAUTIONS:     Exercises/Interventions:   Therapeutic Ex (08895)  Min:2 Resistance/Reps Cues/Notes   OH ria    Wall slides  Gume. well   Wall sleep stretch  Gume. Well    UBE                   HEP 2/1 advised pt to hold all HEP until 2/3     Manual Intervention  (15855)  Min:30     GHJ mobs Bilat. Shoulders long axis and  caudal post. Gr 4     PROM  Bilat. Shoulder ER, IR and  scaption     STM Pecs,infraspinatus                   NMR re-education (26318)  Min: 15     Scap stab. Corner push outs  T slide rows  Green 15 x 2   T     RC activation at end range      S 2   L/R ea   T slide    IR yellow 10 x 3 L/R ea  ER 10 x 3 L/R ea                      Therapeutic Activity (89065)  Min:     dressing 2/1 advised pt dress with button up and zipper shirts and avoid pullovers due to pain w/ dressing, pt verbalized understanding          Modalities:  Min                 Other Therapeutic Activities:  1/18/23 Pt was educated on PT POC, Diagnosis, Prognosis, pathomechanics as well as frequency and duration of scheduling future physical therapy appointments. Time was also taken on this day to answer all patient questions and participation in PT. Reviewed appointment policy in detail with patient and patient verbalized understanding. Home Exercise Program:  HEP instruction: Access Code: [email or web address]  URL: [email or web address]  Date: 01/18/2023  Prepared by: Geremias Pringle     Exercises  Seated Shoulder Flexion Towel Slide at Table Top - 2-3 x daily - 7 x weekly - 1-2 sets - 10 reps - 5-10 hold  Seated Shoulder Abduction Towel Slide at Table Top - 2-3 x daily - 7 x weekly - 1-2 sets - 10 reps - 5-10 hold  Seated Shoulder External Rotation PROM on Table - 2-3 x daily - 7 x weekly - 1-2 sets - 10 reps - 5-10 hold  The patient demonstrated good tolerance to and understanding of the HEP. Written instructions have been issued. Access Code: [email or web address]  URL: [employer]/  Date: 01/25/2023  Prepared by: [facility]    Exercises  Standing shoulder flexion wall slides - 1 x daily - 7 x weekly - 3 sets - 10 reps - 5 hold    Therapeutic Exercise and NMR EXR  [] (24090) Provided verbal/tactile cueing for activities related to strengthening, flexibility, endurance, ROM  for improvements in scapular, scapulothoracic and UE control with self care, reaching, carrying, lifting, house/yardwork, driving/computer work.    [] (63724) Provided verbal/tactile cueing for activities related to improving balance, coordination, kinesthetic sense, posture, motor skill, proprioception  to assist with  scapular, scapulothoracic and UE control with self care, reaching, carrying, lifting, house/yardwork, driving/computer work. Therapeutic Activities:    [] (06016 or 25272) Provided verbal/tactile cueing for activities related to improving balance, coordination, kinesthetic sense, posture, motor skill, proprioception and motor activation to allow for proper function of scapular, scapulothoracic and UE control with self care, carrying, lifting, driving/computer work.      Home Exercise Program:    [x] (41910) Reviewed/Progressed HEP activities related to strengthening, flexibility, endurance, ROM of scapular, scapulothoracic and UE control with self care, reaching, carrying, lifting, house/yardwork, driving/computer work  [] (80100) Reviewed/Progressed HEP activities related to improving balance, coordination, kinesthetic sense, posture, motor skill, proprioception of scapular, scapulothoracic and UE control with self care, reaching, carrying, lifting, house/yardwork, driving/computer work      Manual Treatments:  PROM / STM / Oscillations-Mobs:  G-I, II, III, IV (PA's, Inf., Post.)  [] (36411) Provided manual therapy to mobilize soft tissue/joints of cervical/CT, scapular GHJ and UE for the purpose of modulating pain, promoting relaxation,  increasing ROM, reducing/eliminating soft tissue swelling/inflammation/restriction, improving soft tissue extensibility and allowing for proper ROM for normal function with self care, reaching, carrying, lifting, house/yardwork, driving/computer work    Charges:  Timed Code Treatment Minutes: 45   Total Treatment Minutes: 50       [] EVAL (LOW) 00697 (typically 20 minutes face-to-face)  [] EVAL (MOD) 78424 (typically 30 minutes face-to-face)  [] EVAL (HIGH) 51808 (typically 45 minutes face-to-face)  [] RE-EVAL     [] CB(69884) x     [] Dry needle 1 or 2 Muscles (91059)  [x] NMR (37011) x     [] Dry needle 3+ Muscles (96888)  [x] Manual (20317) x 2    [] Ultrasound (86257) x  [] TA (27562) x     [] Mech Traction (12030)  [] ES(attended) (90753)     [] ES (un) (38502):   [] Vasopump (90792) [] Ionto (30910)   [] Other:    If [facility] Please Indicate Time In/Out  CPT Code Time in Time out                                   Approval Dates:  CPT Code Units Approved Units Used  Date Updated:                     Mari Lam stated goal: sleep better and improve mobility   [] Progressing: [] Met: [] Not Met: [] Adjusted     Therapist goals for Patient:   Short Term Goals: To be achieved in: 2 weeks  1. Independent in HEP and progression per patient tolerance, in order to prevent re-injury. [] Progressing: [] Met: [] Not Met: [] Adjusted  2. Patient will have a decrease in pain 5/10 to facilitate improvement in movement, function, and ADLs as indicated by Functional Deficits. [] Progressing: [] Met: [] Not Met: [] Adjusted     Long Term Goals: To be achieved in: 4-6 weeks  1. Increase FOTO functional outcome score from 69 to 75  to assist with reaching prior level of function.    [] Progressing: [] Met: [] Not Met: [] Adjusted  2. Patient will demonstrate increased AROM to to wfl's at bilateral shoulders  to allow for proper joint functioning as indicated by Functional Deficits. [] Progressing: [] Met: [] Not Met: [] Adjusted  3. Patient will demonstrate an increase in NM recruitment/activation and overall GH and scapular strength to within n5lbs HHD or WNL for proper functional mobility as indicated by patients Functional Deficits. [] Progressing: [] Met: [] Not Met: [] Adjusted  4. Patient will return to reaching for hygiene, dressing and household   activities without increased symptoms or restriction. [] Progressing: [] Met: [] Not Met: [] Adjusted  5. Patient will have a decrease in pain 0-2/10 to facilitate improvement in movement, function, and ADLs as indicated by Functional Deficits. [] Progressing: [] Met: [] Not Met: [] Adjusted    ASSESSMENT:    2/3/23 Pt tolerated gentle manual rx and NMR well today post. L and R shoulder cortisone injections    Treatment/Activity Tolerance:  [x] Patient tolerated treatment well [] Patient limited by fatique  [] Patient limited by pain  [] Patient limited by other medical complications  [] Other:     Overall Progression Towards Functional goals/ Treatment Progress Update:  [x] Patient is progressing as expected towards functional goals listed. [] Progression is slowed due to complexities/Impairments listed. [] Progression has been slowed due to co-morbidities.   [] Plan just implemented, too soon to assess goals progression <30days   [] Goals require adjustment due to lack of progress  [] Patient is not progressing as expected and requires additional follow up with physician  [] Other    Prognosis for POC: [x] Good [] Fair  [] Poor    Patient requires continued skilled intervention: [x] Yes  [] No      PLAN:                               Reassess  ADD wall slides and wall sleeper to HEP   Recover rom and function at bilateral shoulders focus  L>R due to greater limitations , cont. As above to facilitate static and dynamic control at shoulders    [] Continue per plan of care [] Alter current plan (see comments)  [x] Plan of care initiated [] Hold pending MD visit [] Discharge    Electronically signed by: Chastity Rosenthal PT     Note: If patient does not return for scheduled/recommended follow up visits, this note will serve as a discharge from care along with the most recent update on progress.

## 2023-02-03 NOTE — PROGRESS NOTES
Mona Recinos  [id number]  February 3, 2023    Chief Complaint   Patient presents with    Shoulder Pain     Bilateral shoulder pain x 5-6 weeks       History: The patient is an 71-year-old gentleman who is here for evaluation of both shoulders. The patient is right-hand dominant. The patient reports having bilateral shoulder pain for the past 6 weeks. He reports that he woke up and had rather significant bilateral shoulder pain. He denies any specific injury. He initially tried some heat and ice without much relief. He had an at-home TENS unit and was using the unit without much relief. He has tried over-the-counter Advil as well. He recently was sent to physical therapy and reports minimal improvement after the therapy. This is a consult from Quita Reyes MD for bilateral shoulder pain. The patient's  past medical history, medications, allergies,  family history, social history, and have been reviewed, and dated and are recorded in the chart. Pertinent items are noted in HPI. Review of systems reviewed from Pertinent History Form dated on 2/3 and available in the patient's chart under the Media tab. Vitals:  Ht 5' 7\" (1.702 m)   Wt 201 lb (91.2 kg)   BMI 31.48 kg/m²     Physical: Physical: The patient presents today in no acute distress. He is alert and oriented to person, place and time. He displays appropriate mood and affect. He is well dressed, nourished and  groomed. He has a normal affect. Examination of the neck reveals no evidence of tenderness. Spurling's sign is negative. Active range of motion of the bilateral shoulder is: 165 degrees abduction, 165 degrees forward flexion, 35 degrees of external rotation and internal rotation to L5. Passive range of motion of the bilateral shoulder is: 170 degrees abduction, 170 degrees forward flexion, 40 degrees of external rotation and internal rotation to L4.  Examination of the bilateral shoulder reveals positive Neer and Margot Gabe' impingement signs. There is mild subacromial crepitus with range of motion. Drop arm test is negative bilaterally. Speed's test is negative. There is no evidence of tenderness over the Bicipital groove. He  does have tenderness over the [facility] joint bilaterally. Cross body adduction test is positive bilaterally. He has moderate tenderness over the subacromial space bilaterally. There is no evidence of scapular winging bilaterally. Lift off sign is negative bilaterally. There is no evidence of atrophy. External rotation strength is full bilaterally. There are no skin lesions, cellulitis, or extreme edema in the upper extremities. Sensation is intact to axillary, median, radial, and ulnar nerves bilaterally. The patient has warm and well-perfused bilateral upper extremities with brisk capillary refill. Radial and ulnar pulses are palpable and 2+ bilaterally. X-rays: 4 views of both shoulders obtained in the office today were extensively reviewed. There are no lytic or blastic lesions within the bone. There is no evidence of fracture or dislocation. There is moderate AC joint arthritis bilaterally. There is evidence of a type II acromion bilaterally. The glenohumeral joint is well-preserved bilaterally. Impression: #1 bilateral shoulder rotator cuff tendinitis/impingement #2 bilateral shoulder AC joint arthritis    Plan: The patient was explained the risks, benefits and alternatives to injection. The patient understood the risks and benefits and agreed to proceed. At this time we will inject the bilateral shoulders under sterile conditions. After a Betadine prep of the shoulders, 3cc of 0.5% Marcaine and 2cc of 40 mg Kenalog were injected into the shoulders. The patient tolerated the injections rather well. The patient was instructed to follow up immediately for any signs of infection. The patient will follow up with me in 6 week(s). The treatment plan was discussed in detail with the patient and includes activity modification and avoidance of repetitive overhead activities. Home exercises were explained to the patient. A formal therapy program was discussed with the patient and already has. If the patient continues to have severe pain and weakness, we will consider other options.             Orders Placed This Encounter   Procedures    XR SHOULDER LEFT (MIN 2 VIEWS)     Standing Status:   Future     Number of Occurrences:   1     Standing Expiration Date:   2/3/2024     Scheduling Instructions:      Rm 25      4 view     Order Specific Question:   Reason for exam:     Answer:   pain    XR SHOULDER RIGHT (MIN 2 VIEWS)     Standing Status:   Future     Number of Occurrences:   1     Standing Expiration Date:   2/3/2024     Scheduling Instructions:      Rm 25      4 views     Order Specific Question:   Reason for exam:     Answer:   pain

## 2023-02-07 ENCOUNTER — HOSPITAL ENCOUNTER (OUTPATIENT)
Dept: PHYSICAL THERAPY | Age: 83
Setting detail: THERAPIES SERIES
Discharge: HOME | End: 2023-02-07
Payer: COMMERCIAL

## 2023-02-07 PROCEDURE — 97140 MANUAL THERAPY 1/> REGIONS: CPT

## 2023-02-07 PROCEDURE — 97112 NEUROMUSCULAR REEDUCATION: CPT

## 2023-02-07 PROCEDURE — 97110 THERAPEUTIC EXERCISES: CPT

## 2023-02-07 NOTE — FLOWSHEET NOTE
[facility]. [address]  Phone: [phone]   Fax:     [phone]        Physical Therapy Treatment Note/ Progress Report:       Date:  2023    Patient Name:  Manon Dakin    :  1940  MRN: [id number]    Pertinent Medical History:     Medical/Treatment Diagnosis Information:  Medical Diagnosis: Adhesive capsulitis of right shoulder [M75.01]  Treatment Diagnosis: Bilateral shoulder hypomobility limiting tolerance to adl's    Insurance/Certification information:  PT Insurance Information: [id number]  Physician Information:  José Harris MD  Plan of care signed (Y/N): routed 23    Date of Patient follow up with Physician:      Progress Report: []  Yes  [x]  No     Date Range for reporting period:  Beginnin2023  Ending:      Progress report due (10 Rx/or 30 days whichever is less): 4/53/15     Recertification due (POC duration/ or 90 days whichever is less):      Visit # POC/Insurance Allowable Auth Needed   7 UHC/ DED NOT MET/ NO COPAY/ NO AUTH/ []Yes    [x]No     Functional Outcomes Measure:   Date Assessed:  Test: FOTO  Score:     Pain level:  1/10     History of Injury: Patient stated complaint:  right shoulder pain and stiffness began ~ 2 weeks ago insidiously , c/o pain in multiple other joints including left shoulder , denies previous h/o injury , C/O CONSTANT  bilat. Shoulder pain, denies radiating pain, PARESTHESIA - DENIES ANY, INCREASED PAIN  with toilet hygiene , dressing, lying on either shoulder ,  DECREASED PAIN  with relative rest, Advi and heat , WORK  he is a retired  , ACTIVITIES  include walking    SUBJECTIVE:    23 Pt reports soreness in shoulder last night. 23 Patient reports both shoulders has been more sore,kept him up at night.   23 Patient reports shoulders are feeling a little looser he was able to sleep better last night.  2/1/23 Pt reports increased shoulder pain and difficulty sleeping at night.   2/3/23 Pt reports increased pain this morning and he did have cortisone injections to each shoulder earlier this morning from Dr Simmons.    2/7/23 Pt reports his pain is significantly less today.     OBJECTIVE:   Observation:   Test measurements:      RESTRICTIONS/PRECAUTIONS:     Exercises/Interventions:   Therapeutic Ex (83191)  Min:2 Resistance/Reps Cues/Notes   OH ria    Wall slides  Gume. well   Wall sleep stretch  Gume. Well    UBE                   HEP 2/1 advised pt to hold all HEP until 2/3     Manual Intervention  (27603)  Min:30     GHJ mobs Bilat. Shoulders long axis and  caudal post. Gr 4   Left scap. Thoracic upward rotation gr 3-4    PROM  Bilat. Shoulder ER, IR and  scaption     STM Pecs,infraspinatus                   NMR re-education (30971)  Min: 15     Scap stab.      Corner push outs  T slide rows  Blue15 x 2   T     RC activation at end range      S 2   L/R ea   T slide    IR Green  10 x 3 L/R ea  ER 10 x 3 L/R ea                      Therapeutic Activity (97118)  Min:     dressing 2/1 advised pt dress with button up and zipper shirts and avoid pullovers due to pain w/ dressing, pt verbalized understanding          Modalities:  Min                 Other Therapeutic Activities:  1/18/23 Pt was educated on PT POC, Diagnosis, Prognosis, pathomechanics as well as frequency and duration of scheduling future physical therapy appointments. Time was also taken on this day to answer all patient questions and participation in PT. Reviewed appointment policy in detail with patient and patient verbalized understanding.     Home Exercise Program:  HEP instruction: Access Code: [email or web address]  URL: [email or web address]  Date: 01/18/2023  Prepared by: Alize Fuchs     Exercises  Seated Shoulder Flexion Towel Slide at Table Top - 2-3 x daily - 7 x weekly - 1-2 sets - 10 reps - 5-10 hold  Seated Shoulder  Abduction Towel Slide at Table Top - 2-3 x daily - 7 x weekly - 1-2 sets - 10 reps - 5-10 hold  Seated Shoulder External Rotation PROM on Table - 2-3 x daily - 7 x weekly - 1-2 sets - 10 reps - 5-10 hold  The patient demonstrated good tolerance to and understanding of the HEP. Written instructions have been issued. Access Code: [email or web address]  URL: [email or web address]  Date: 01/25/2023  Prepared by: Geo Rodrigues    Exercises  Standing shoulder flexion wall slides - 1 x daily - 7 x weekly - 3 sets - 10 reps - 5 hold    Therapeutic Exercise and NMR EXR  [] (58734) Provided verbal/tactile cueing for activities related to strengthening, flexibility, endurance, ROM  for improvements in scapular, scapulothoracic and UE control with self care, reaching, carrying, lifting, house/yardwork, driving/computer work.    [] (31645) Provided verbal/tactile cueing for activities related to improving balance, coordination, kinesthetic sense, posture, motor skill, proprioception  to assist with  scapular, scapulothoracic and UE control with self care, reaching, carrying, lifting, house/yardwork, driving/computer work. Therapeutic Activities:    [] (78616 or 42181) Provided verbal/tactile cueing for activities related to improving balance, coordination, kinesthetic sense, posture, motor skill, proprioception and motor activation to allow for proper function of scapular, scapulothoracic and UE control with self care, carrying, lifting, driving/computer work.      Home Exercise Program:    [x] (40542) Reviewed/Progressed HEP activities related to strengthening, flexibility, endurance, ROM of scapular, scapulothoracic and UE control with self care, reaching, carrying, lifting, house/yardwork, driving/computer work  [] (82419) Reviewed/Progressed HEP activities related to improving balance, coordination, kinesthetic sense, posture, motor skill, proprioception of scapular, scapulothoracic and UE control with self care, reaching, carrying, lifting, house/yardwork, driving/computer work      Manual Treatments:  PROM / STM / Oscillations-Mobs:  G-I, II, III, IV (PA's, Inf., Post.)  [] (25984) Provided manual therapy to mobilize soft tissue/joints of cervical/CT, scapular GHJ and UE for the purpose of modulating pain, promoting relaxation,  increasing ROM, reducing/eliminating soft tissue swelling/inflammation/restriction, improving soft tissue extensibility and allowing for proper ROM for normal function with self care, reaching, carrying, lifting, house/yardwork, driving/computer work    Charges:  Timed Code Treatment Minutes: 45   Total Treatment Minutes: 50       [] EVAL (LOW) 09577 (typically 20 minutes face-to-face)  [] EVAL (MOD) 09581 (typically 30 minutes face-to-face)  [] EVAL (HIGH) 39465 (typically 45 minutes face-to-face)  [] RE-EVAL     [] BM(83426) x     [] Dry needle 1 or 2 Muscles (24844)  [x] NMR (36749) x     [] Dry needle 3+ Muscles (59916)  [x] Manual (22476) x 2    [] Ultrasound (74457) x  [] TA (06781) x     [] Mech Traction (77113)  [] ES(attended) (37694)     [] ES (un) (77590):   [] Vasopump (84728) [] Ionto (83732)   [] Other:    If [facility] Please Indicate Time In/Out  CPT Code Time in Time out                                   Approval Dates:  CPT Code Units Approved Units Used  Date Updated:                     Stef Haynes stated goal: sleep better and improve mobility   [] Progressing: [] Met: [] Not Met: [] Adjusted     Therapist goals for Patient:   Short Term Goals: To be achieved in: 2 weeks  1. Independent in HEP and progression per patient tolerance, in order to prevent re-injury. [] Progressing: [] Met: [] Not Met: [] Adjusted  2. Patient will have a decrease in pain 5/10 to facilitate improvement in movement, function, and ADLs as indicated by Functional Deficits. [] Progressing: [] Met: [] Not Met: [] Adjusted     Long Term Goals: To be achieved in: 4-6 weeks  1.   Increase FOTO functional outcome score from 69 to 75  to assist with reaching prior level of function. [] Progressing: [] Met: [] Not Met: [] Adjusted  2. Patient will demonstrate increased AROM to to wfl's at bilateral shoulders  to allow for proper joint functioning as indicated by Functional Deficits. [] Progressing: [] Met: [] Not Met: [] Adjusted  3. Patient will demonstrate an increase in NM recruitment/activation and overall GH and scapular strength to within n5lbs HHD or WNL for proper functional mobility as indicated by patients Functional Deficits. [] Progressing: [] Met: [] Not Met: [] Adjusted  4. Patient will return to reaching for hygiene, dressing and household   activities without increased symptoms or restriction. [] Progressing: [] Met: [] Not Met: [] Adjusted  5. Patient will have a decrease in pain 0-2/10 to facilitate improvement in movement, function, and ADLs as indicated by Functional Deficits. [] Progressing: [] Met: [] Not Met: [] Adjusted    ASSESSMENT:    2/3/23 Pt tolerated gentle manual rx and NMR well today post. L and R shoulder cortisone injections    Treatment/Activity Tolerance:  [x] Patient tolerated treatment well [] Patient limited by fatique  [] Patient limited by pain  [] Patient limited by other medical complications  [] Other:     Overall Progression Towards Functional goals/ Treatment Progress Update:  [x] Patient is progressing as expected towards functional goals listed. [] Progression is slowed due to complexities/Impairments listed. [] Progression has been slowed due to co-morbidities.   [] Plan just implemented, too soon to assess goals progression <30days   [] Goals require adjustment due to lack of progress  [] Patient is not progressing as expected and requires additional follow up with physician  [] Other    Prognosis for POC: [x] Good [] Fair  [] Poor    Patient requires continued skilled intervention: [x] Yes  [] No      PLAN:                               Reassess  ADD wall slides and maulik hart to HEP   Recover rom and function at bilateral shoulders focus  L>R due to greater limitations , cont. As above to facilitate static and dynamic control at shoulders    [] Continue per plan of care [] Alter current plan (see comments)  [x] Plan of care initiated [] Hold pending MD visit [] Discharge    Electronically signed by: Kristin Barfield, PT     Note: If patient does not return for scheduled/recommended follow up visits, this note will serve as a discharge from care along with the most recent update on progress.

## 2023-02-09 ENCOUNTER — HOSPITAL ENCOUNTER (OUTPATIENT)
Dept: PHYSICAL THERAPY | Age: 83
Setting detail: THERAPIES SERIES
Discharge: HOME | End: 2023-02-09
Payer: COMMERCIAL

## 2023-02-09 PROCEDURE — 97112 NEUROMUSCULAR REEDUCATION: CPT

## 2023-02-09 PROCEDURE — 97140 MANUAL THERAPY 1/> REGIONS: CPT

## 2023-02-09 NOTE — FLOWSHEET NOTE
[facility]. [address] [place] [address]  Phone: [phone]   Fax:     [phone]        Physical Therapy Treatment Note/ Progress Report:       Date:  2023    Patient Name:  Octavia Rodrigez    :  1940  MRN: [id number]    Pertinent Medical History:     Medical/Treatment Diagnosis Information:  Medical Diagnosis: Adhesive capsulitis of right shoulder [M75.01]  Treatment Diagnosis: Bilateral shoulder hypomobility limiting tolerance to adl's    Insurance/Certification information:  PT Insurance Information: [id number]  Physician Information:  Huyen Alexander MD  Plan of care signed (Y/N): routed 23    Date of Patient follow up with Physician:      Progress Report: []  Yes  [x]  No     Date Range for reporting period:  Beginnin2023  Ending:      Progress report due (10 Rx/or 30 days whichever is less):      Recertification due (POC duration/ or 90 days whichever is less):      Visit # POC/Insurance Allowable Auth Needed   8 UHC/ DED NOT MET/ NO COPAY/ NO AUTH/ []Yes    [x]No     Functional Outcomes Measure:   Date Assessed:  Test: FOTO  Score:     Pain level:  1/10     History of Injury: Patient stated complaint:  right shoulder pain and stiffness began ~ 2 weeks ago insidiously , c/o pain in multiple other joints including left shoulder , denies previous h/o injury , C/O CONSTANT  bilat. Shoulder pain, denies radiating pain, PARESTHESIA - DENIES ANY, INCREASED PAIN  with toilet hygiene , dressing, lying on either shoulder ,  DECREASED PAIN  with relative rest, Advi and heat , WORK  he is a retired  , ACTIVITIES  include walking    SUBJECTIVE:    23 Pt reports soreness in shoulder last night. 23 Patient reports both shoulders has been more sore,kept him up at night.   23 Patient reports shoulders are feeling a little looser he was able to sleep better last night. 2/1/23 Pt reports increased shoulder pain and difficulty sleeping at night. 2/3/23 Pt reports increased pain this morning and he did have cortisone injections to each shoulder earlier this morning from Dr Rebecca Mobley. 2/7/23 Pt reports his pain is significantly less today. 2/9/23     OBJECTIVE:   Observation:   Test measurements:      RESTRICTIONS/PRECAUTIONS:     Exercises/Interventions:   Therapeutic Ex (45255)  Min: Resistance/Reps Cues/Notes   OH ria    Wall slides  Gume. well   Wall sleep stretch  Gume. Well    UBE                   HEP 2/1 advised pt to hold all HEP until 2/3     Manual Intervention  (10241)  Min:25     GHJ mobs Bilat. Shoulders long axis and  caudal post. Gr 4   Left scap. Thoracic upward rotation gr 3-4    PROM  Bilat. Shoulder ER, IR and  scaption     STM Pecs,infraspinatus                   NMR re-education (03956)  Min: 20     Scap stab. Corner push outs  T slide rows  Blue15 x 2   T     RC activation at end range      S 2   L/R ea   T slide    IR Green  20 x 2 L/R ea  ER yellow 20 x 2  L/R ea   Open can 2# 10 x 3                      Therapeutic Activity (76274)  Min:     dressing 2/1 advised pt dress with button up and zipper shirts and avoid pullovers due to pain w/ dressing, pt verbalized understanding          Modalities:  Min                 Other Therapeutic Activities:  1/18/23 Pt was educated on PT POC, Diagnosis, Prognosis, pathomechanics as well as frequency and duration of scheduling future physical therapy appointments. Time was also taken on this day to answer all patient questions and participation in PT. Reviewed appointment policy in detail with patient and patient verbalized understanding. Home Exercise Program:  HEP instruction: Access Code: [email or web address]  URL: [email or web address]  Date: 01/18/2023  Prepared by: Teodoro Mcmahon     Exercises  Seated Shoulder Flexion Towel Slide at Table Top - 2-3 x daily - 7 x weekly - 1-2 sets - 10 reps - 5-10 hold  Seated Shoulder Abduction Towel Slide at Table Top - 2-3 x daily - 7 x weekly - 1-2 sets - 10 reps - 5-10 hold  Seated Shoulder External Rotation PROM on Table - 2-3 x daily - 7 x weekly - 1-2 sets - 10 reps - 5-10 hold  The patient demonstrated good tolerance to and understanding of the HEP. Written instructions have been issued. Access Code: [email or web address]  URL: [email or web address]  Date: 01/25/2023  Prepared by: Rob Ray    Exercises  Standing shoulder flexion wall slides - 1 x daily - 7 x weekly - 3 sets - 10 reps - 5 hold    Therapeutic Exercise and NMR EXR  [] (96015) Provided verbal/tactile cueing for activities related to strengthening, flexibility, endurance, ROM  for improvements in scapular, scapulothoracic and UE control with self care, reaching, carrying, lifting, house/yardwork, driving/computer work.    [] (90473) Provided verbal/tactile cueing for activities related to improving balance, coordination, kinesthetic sense, posture, motor skill, proprioception  to assist with  scapular, scapulothoracic and UE control with self care, reaching, carrying, lifting, house/yardwork, driving/computer work. Therapeutic Activities:    [] (74879 or 39603) Provided verbal/tactile cueing for activities related to improving balance, coordination, kinesthetic sense, posture, motor skill, proprioception and motor activation to allow for proper function of scapular, scapulothoracic and UE control with self care, carrying, lifting, driving/computer work.      Home Exercise Program:    [x] (05722) Reviewed/Progressed HEP activities related to strengthening, flexibility, endurance, ROM of scapular, scapulothoracic and UE control with self care, reaching, carrying, lifting, house/yardwork, driving/computer work  [] (66977) Reviewed/Progressed HEP activities related to improving balance, coordination, kinesthetic sense, posture, motor skill, proprioception of scapular, scapulothoracic and UE control with self care, reaching, carrying, lifting, house/yardwork, driving/computer work      Manual Treatments:  PROM / STM / Oscillations-Mobs:  G-I, II, III, IV (Rory, Inf., Post.)  [] (07292) Provided manual therapy to mobilize soft tissue/joints of cervical/CT, scapular GHJ and UE for the purpose of modulating pain, promoting relaxation,  increasing ROM, reducing/eliminating soft tissue swelling/inflammation/restriction, improving soft tissue extensibility and allowing for proper ROM for normal function with self care, reaching, carrying, lifting, house/yardwork, driving/computer work    Charges:  Timed Code Treatment Minutes: 45   Total Treatment Minutes: 50       [] EVAL (LOW) 45370 (typically 20 minutes face-to-face)  [] EVAL (MOD) 67552 (typically 30 minutes face-to-face)  [] EVAL (HIGH) 94980 (typically 45 minutes face-to-face)  [] RE-EVAL     [] FJ(71692) x     [] Dry needle 1 or 2 Muscles (96863)  [x] NMR (06592) x     [] Dry needle 3+ Muscles (73538)  [x] Manual (53348) x 2    [] Ultrasound (62017) x  [] TA (32179) x     [] Mech Traction (53866)  [] ES(attended) (09611)     [] ES (un) (22988):   [] Vasopump (13103) [] Ionto (72349)   [] Other:    If [facility] Please Indicate Time In/Out  CPT Code Time in Time out                                   Approval Dates:  CPT Code Units Approved Units Used  Date Updated:                     Chantal Arango stated goal: sleep better and improve mobility   [] Progressing: [] Met: [] Not Met: [] Adjusted     Therapist goals for Patient:   Short Term Goals: To be achieved in: 2 weeks  1. Independent in HEP and progression per patient tolerance, in order to prevent re-injury. [] Progressing: [] Met: [] Not Met: [] Adjusted  2. Patient will have a decrease in pain 5/10 to facilitate improvement in movement, function, and ADLs as indicated by Functional Deficits. [] Progressing: [] Met: [] Not Met: [] Adjusted     Long Term Goals: To be achieved in: 4-6 weeks  1. Increase FOTO functional outcome score from 69 to 75  to assist with reaching prior level of function. [] Progressing: [] Met: [] Not Met: [] Adjusted  2. Patient will demonstrate increased AROM to to wfl's at bilateral shoulders  to allow for proper joint functioning as indicated by Functional Deficits. [] Progressing: [] Met: [] Not Met: [] Adjusted  3. Patient will demonstrate an increase in NM recruitment/activation and overall GH and scapular strength to within n5lbs HHD or WNL for proper functional mobility as indicated by patients Functional Deficits. [] Progressing: [] Met: [] Not Met: [] Adjusted  4. Patient will return to reaching for hygiene, dressing and household   activities without increased symptoms or restriction. [] Progressing: [] Met: [] Not Met: [] Adjusted  5. Patient will have a decrease in pain 0-2/10 to facilitate improvement in movement, function, and ADLs as indicated by Functional Deficits. [] Progressing: [] Met: [] Not Met: [] Adjusted    ASSESSMENT:    2/3/23 Pt tolerated gentle manual rx and NMR well today post. L and R shoulder cortisone injections    Treatment/Activity Tolerance:  [x] Patient tolerated treatment well [] Patient limited by fatique  [] Patient limited by pain  [] Patient limited by other medical complications  [] Other:     Overall Progression Towards Functional goals/ Treatment Progress Update:  [x] Patient is progressing as expected towards functional goals listed. [] Progression is slowed due to complexities/Impairments listed. [] Progression has been slowed due to co-morbidities.   [] Plan just implemented, too soon to assess goals progression <30days   [] Goals require adjustment due to lack of progress  [] Patient is not progressing as expected and requires additional follow up with physician  [] Other    Prognosis for POC: [x] Good [] Fair  [] Poor    Patient requires continued skilled intervention: [x] Yes  [] No      PLAN: Reassess Last Rx 2/24/23 Complete HEP     Recover rom and function at bilateral shoulders focus  L>R due to greater limitations , cont. As above to facilitate static and dynamic control at shoulders    [] Continue per plan of care [] Alter current plan (see comments)  [x] Plan of care initiated [] Hold pending MD visit [] Discharge    Electronically signed by: Gloria Knowles, PT     Note: If patient does not return for scheduled/recommended follow up visits, this note will serve as a discharge from care along with the most recent update on progress.

## 2023-02-14 ENCOUNTER — HOSPITAL ENCOUNTER (OUTPATIENT)
Dept: PHYSICAL THERAPY | Age: 83
Setting detail: THERAPIES SERIES
Discharge: HOME | End: 2023-02-14
Payer: COMMERCIAL

## 2023-02-14 PROCEDURE — 97140 MANUAL THERAPY 1/> REGIONS: CPT

## 2023-02-14 PROCEDURE — 97112 NEUROMUSCULAR REEDUCATION: CPT

## 2023-02-14 NOTE — FLOWSHEET NOTE
[facility] [place]. [address]  Phone: [phone]   Fax:     [phone]        Physical Therapy Treatment Note/ Progress Report:       Date:  2023    Patient Name:  Genet Sneed    :  1940  MRN: [id number]    Pertinent Medical History:     Medical/Treatment Diagnosis Information:  Medical Diagnosis: Adhesive capsulitis of right shoulder [M75.01]  Treatment Diagnosis: Bilateral shoulder hypomobility limiting tolerance to adl's    Insurance/Certification information:  PT Insurance Information: [id number]  Physician Information:  Rock Hull MD  Plan of care signed (Y/N): routed 23    Date of Patient follow up with Physician:      Progress Report: []  Yes  [x]  No     Date Range for reporting period:  Beginnin2023  Ending:      Progress report due (10 Rx/or 30 days whichever is less): 50     Recertification due (POC duration/ or 90 days whichever is less):      Visit # POC/Insurance Allowable Auth Needed   9 UHC/ DED NOT MET/ NO COPAY/ NO AUTH/ []Yes    [x]No     Functional Outcomes Measure:   Date Assessed:  Test: FOTO  Score:     Pain level:  0/10     History of Injury: Patient stated complaint:  right shoulder pain and stiffness began ~ 2 weeks ago insidiously , c/o pain in multiple other joints including left shoulder , denies previous h/o injury , C/O CONSTANT  bilat. Shoulder pain, denies radiating pain, PARESTHESIA - DENIES ANY, INCREASED PAIN  with toilet hygiene , dressing, lying on either shoulder ,  DECREASED PAIN  with relative rest, Advi and heat , WORK  he is a retired  , ACTIVITIES  include walking    SUBJECTIVE:    23 Pt reports soreness in shoulder last night. 23 Patient reports both shoulders has been more sore,kept him up at night.   23 Patient reports shoulders are feeling a little looser he was able to sleep better last night. 2/1/23 Pt reports increased shoulder pain and difficulty sleeping at night. 2/3/23 Pt reports increased pain this morning and he did have cortisone injections to each shoulder earlier this morning from Dr Charly Rodrigues. 2/7/23 Pt reports his pain is significantly less today. 2/14/23 Pt reports no pain today. OBJECTIVE:   Observation:   Test measurements:      RESTRICTIONS/PRECAUTIONS:     Exercises/Interventions:   Therapeutic Ex (79169)  Min:5 Resistance/Reps Cues/Notes   OH ria    Wall slides  Gume. well   Wall sleep stretch  Gume. Well    UBE    Corner stretch 3 way  10 breath cycles ea               HEP 2/1 advised pt to hold all HEP until 2/3     Manual Intervention  (22616)  Min:10     GHJ mobs BL   PROM  Bilat. Shoulder ER, IR and  scaption     STM Pecs,infraspinatus                   NMR re-education (99008)  Min: 30     Scap stab. Corner push outs  T slide rows  Blue15 x 2   T     RC activation at end range      S    Corner push   T slide    IR Green 30 x L/R ea  ER yellow 30 x   L/R ea   Open can 2# 10 x 3   W red 10 x 3                      Therapeutic Activity (54041)  Min:     dressing 2/1 advised pt dress with button up and zipper shirts and avoid pullovers due to pain w/ dressing, pt verbalized understanding          Modalities:  Min                 Other Therapeutic Activities:  1/18/23 Pt was educated on PT POC, Diagnosis, Prognosis, pathomechanics as well as frequency and duration of scheduling future physical therapy appointments. Time was also taken on this day to answer all patient questions and participation in PT. Reviewed appointment policy in detail with patient and patient verbalized understanding. Home Exercise Program:  HEP instruction: Access Code: [email or web address]  URL: [email or web address]  Date: 01/18/2023  Prepared by: Rory Llamas     Exercises  Seated Shoulder Flexion Towel Slide at Table Top - 2-3 x daily - 7 x weekly - 1-2 sets - 10 reps - 5-10 hold  Seated Shoulder Abduction Towel Slide at Table Top - 2-3 x daily - 7 x weekly - 1-2 sets - 10 reps - 5-10 hold  Seated Shoulder External Rotation PROM on Table - 2-3 x daily - 7 x weekly - 1-2 sets - 10 reps - 5-10 hold  The patient demonstrated good tolerance to and understanding of the HEP. Written instructions have been issued. Access Code: [email or web address]  URL: [email or web address]/  Date: 01/25/2023  Prepared by: Alka Case    Exercises  Standing shoulder flexion wall slides - 1 x daily - 7 x weekly - 3 sets - 10 reps - 5 hold    Therapeutic Exercise and NMR EXR  [] (75826) Provided verbal/tactile cueing for activities related to strengthening, flexibility, endurance, ROM  for improvements in scapular, scapulothoracic and UE control with self care, reaching, carrying, lifting, house/yardwork, driving/computer work.    [] (99275) Provided verbal/tactile cueing for activities related to improving balance, coordination, kinesthetic sense, posture, motor skill, proprioception  to assist with  scapular, scapulothoracic and UE control with self care, reaching, carrying, lifting, house/yardwork, driving/computer work. Therapeutic Activities:    [] (06103 or 65636) Provided verbal/tactile cueing for activities related to improving balance, coordination, kinesthetic sense, posture, motor skill, proprioception and motor activation to allow for proper function of scapular, scapulothoracic and UE control with self care, carrying, lifting, driving/computer work.      Home Exercise Program:    [x] (88247) Reviewed/Progressed HEP activities related to strengthening, flexibility, endurance, ROM of scapular, scapulothoracic and UE control with self care, reaching, carrying, lifting, house/yardwork, driving/computer work  [] (57962) Reviewed/Progressed HEP activities related to improving balance, coordination, kinesthetic sense, posture, motor skill, proprioception of scapular, scapulothoracic and UE control with self care, reaching, carrying, lifting, house/yardwork, driving/computer work      Manual Treatments:  PROM / STM / Oscillations-Mobs:  G-I, II, III, IV (Rory, Inf., Post.)  [] (56176) Provided manual therapy to mobilize soft tissue/joints of cervical/CT, scapular GHJ and UE for the purpose of modulating pain, promoting relaxation,  increasing ROM, reducing/eliminating soft tissue swelling/inflammation/restriction, improving soft tissue extensibility and allowing for proper ROM for normal function with self care, reaching, carrying, lifting, house/yardwork, driving/computer work    Charges:  Timed Code Treatment Minutes: 45   Total Treatment Minutes: 50       [] EVAL (LOW) 07484 (typically 20 minutes face-to-face)  [] EVAL (MOD) 70856 (typically 30 minutes face-to-face)  [] EVAL (HIGH) 95365 (typically 45 minutes face-to-face)  [] RE-EVAL     [] LA(60911) x     [] Dry needle 1 or 2 Muscles (57851)  [x] NMR (84231) x  2   [] Dry needle 3+ Muscles (52952)  [x] Manual (46990) x    [] Ultrasound (10416) x  [] TA (46500) x     [] Mech Traction (31660)  [] ES(attended) (91231)     [] ES (un) (80052):   [] Vasopump (43826) [] Ionto (65715)   [] Other:    If [facility] Please Indicate Time In/Out  CPT Code Time in Time out                                   Approval Dates:  CPT Code Units Approved Units Used  Date Updated:                     Barb Reed stated goal: sleep better and improve mobility   [] Progressing: [] Met: [] Not Met: [] Adjusted     Therapist goals for Patient:   Short Term Goals: To be achieved in: 2 weeks  1. Independent in HEP and progression per patient tolerance, in order to prevent re-injury. [] Progressing: [] Met: [] Not Met: [] Adjusted  2. Patient will have a decrease in pain 5/10 to facilitate improvement in movement, function, and ADLs as indicated by Functional Deficits. [] Progressing: [] Met: [] Not Met: [] Adjusted     Long Term Goals: To be achieved in: 4-6 weeks  1.   Increase FOTO functional outcome score from 69 to 75  to assist with reaching prior level of function. [] Progressing: [] Met: [] Not Met: [] Adjusted  2. Patient will demonstrate increased AROM to to wfl's at bilateral shoulders  to allow for proper joint functioning as indicated by Functional Deficits. [] Progressing: [] Met: [] Not Met: [] Adjusted  3. Patient will demonstrate an increase in NM recruitment/activation and overall GH and scapular strength to within n5lbs HHD or WNL for proper functional mobility as indicated by patients Functional Deficits. [] Progressing: [] Met: [] Not Met: [] Adjusted  4. Patient will return to reaching for hygiene, dressing and household   activities without increased symptoms or restriction. [] Progressing: [] Met: [] Not Met: [] Adjusted  5. Patient will have a decrease in pain 0-2/10 to facilitate improvement in movement, function, and ADLs as indicated by Functional Deficits. [] Progressing: [] Met: [] Not Met: [] Adjusted    ASSESSMENT:    2/3/23 Pt tolerated gentle manual rx and NMR well today post. L and R shoulder cortisone injections    Treatment/Activity Tolerance:  [x] Patient tolerated treatment well [] Patient limited by fatique  [] Patient limited by pain  [] Patient limited by other medical complications  [] Other:     Overall Progression Towards Functional goals/ Treatment Progress Update:  [x] Patient is progressing as expected towards functional goals listed. [] Progression is slowed due to complexities/Impairments listed. [] Progression has been slowed due to co-morbidities.   [] Plan just implemented, too soon to assess goals progression <30days   [] Goals require adjustment due to lack of progress  [] Patient is not progressing as expected and requires additional follow up with physician  [] Other    Prognosis for POC: [x] Good [] Fair  [] Poor    Patient requires continued skilled intervention: [x] Yes  [] No      PLAN: Reassess/ consider early DC  Last Rx 2/24/23 Complete HEP     Recover rom and function at bilateral shoulders focus  L>R due to greater limitations , cont. As above to facilitate static and dynamic control at shoulders    [] Continue per plan of care [] Alter current plan (see comments)  [x] Plan of care initiated [] Hold pending MD visit [] Discharge    Electronically signed by: Tyshawn Daniel PT     Note: If patient does not return for scheduled/recommended follow up visits, this note will serve as a discharge from care along with the most recent update on progress.

## 2023-02-16 ENCOUNTER — TELEPHONE (OUTPATIENT)
Dept: CARDIOLOGY CLINIC | Age: 83
End: 2023-02-16

## 2023-02-16 NOTE — TELEPHONE ENCOUNTER
Lana Frank called to schedule his yearly f/u with Desi Tilley and wanted to know if he needed to have an echo prior to his yearly f/u. Lana Frank stated that he wrote the information down on his AVS. I did not see anything ordered. I informed Lana Frank that if he is to have an echo scheduled, the nurse will call and facilitate.     Allen's callback number: [phone]

## 2023-02-16 NOTE — TELEPHONE ENCOUNTER
Looked through [employer] chart and last OV note - per Dr. Augustine Mcguire patient needed a repeat chest CT which he completed in April. Under CT result, Dr. Augustine Mcguire does not mention the need for an echocardiogram at this time. Called patient to discuss, he v/u and states he made an appointment with Dr. Augustine Mcguire 3/17/23. Encouraged that we will discuss any further needs at that time. He v/u.

## 2023-02-17 ENCOUNTER — HOSPITAL ENCOUNTER (OUTPATIENT)
Dept: PHYSICAL THERAPY | Age: 83
Setting detail: THERAPIES SERIES
Discharge: HOME | End: 2023-02-17
Payer: COMMERCIAL

## 2023-02-17 PROCEDURE — 97110 THERAPEUTIC EXERCISES: CPT

## 2023-02-17 PROCEDURE — 97112 NEUROMUSCULAR REEDUCATION: CPT

## 2023-02-17 NOTE — FLOWSHEET NOTE
[address] [facility]. [address]  Phone: [phone]   Fax:     [phone]     Physical Therapy Discharge Summary    Dear Dawn Rhodes MD,    We had the pleasure of treating the following patient for physical therapy services at [address] [place] [address]. A summary of our findings can be found in the discharge summary below. If you have any questions or concerns regarding these findings, please do not hesitate to contact me at the office phone number above.   Thank you for the referral.     Physician Signature:________________________________Date:__________________  By signing above (or electronic signature), therapists plan is approved by physician      Overall Response to Treatment:   [x]Patient is responding well to treatment and improvement is noted with regards  to goals   [x]Patient should continue to improve in reasonable time if they continue HEP   []Patient has plateaued and is no longer responding to skilled PT intervention    []Patient is getting worse and would benefit from return to referring MD   []Patient unable to adhere to initial POC   []Other:     Date range of Visits: 23 TO 23  Total Visits: 10          Physical Therapy Treatment Note/ Progress Report:       Date:  2023    Patient Name:  Berta Blunt    :  1940  MRN: [id number]    Pertinent Medical History:     Medical/Treatment Diagnosis Information:  Medical Diagnosis: Adhesive capsulitis of right shoulder [M75.01]  Treatment Diagnosis: Bilateral shoulder hypomobility limiting tolerance to adl's    Insurance/Certification information:  PT Insurance Information: [id number]  Physician Information:  Dawn Rhodes MD  Plan of care signed (Y/N): routed 23    Date of Patient follow up with Physician:      Progress Report: []  Yes  [x]  No     Date Range for reporting period:  Beginnin2023  Ending:      Progress report due (10 Rx/or 30 days whichever is less): 1/82/67     Recertification due (POC duration/ or 90 days whichever is less):      Visit # POC/Insurance Allowable Auth Needed   10 UHC/ DED NOT MET/ NO COPAY/ NO AUTH/ []Yes    [x]No     Functional Outcomes Measure:   Date Assessed:  Test: uefi =80  Score:     Pain level:  0/10     History of Injury: Patient stated complaint:  right shoulder pain and stiffness began ~ 2 weeks ago insidiously , c/o pain in multiple other joints including left shoulder , denies previous h/o injury , C/O CONSTANT  bilat. Shoulder pain, denies radiating pain, PARESTHESIA - DENIES ANY, INCREASED PAIN  with toilet hygiene , dressing, lying on either shoulder ,  DECREASED PAIN  with relative rest, Advi and heat , WORK  he is a retired  , ACTIVITIES  include walking    SUBJECTIVE:    1/20/23 Pt reports soreness in shoulder last night. 01/23/23 Patient reports both shoulders has been more sore,kept him up at night. 01/25/23 Patient reports shoulders are feeling a little looser he was able to sleep better last night. 2/1/23 Pt reports increased shoulder pain and difficulty sleeping at night. 2/3/23 Pt reports increased pain this morning and he did have cortisone injections to each shoulder earlier this morning from Dr Joi Dhillon. 2/7/23 Pt reports his pain is significantly less today. 2/14/23 Pt reports no pain today. 2/17/23 Pt reports no pain today, he is ready to make today his last in PT.      OBJECTIVE:   Observation:   Test measurements:      RESTRICTIONS/PRECAUTIONS:     Exercises/Interventions:   Therapeutic Ex (09758)  Min:5 Resistance/Reps Cues/Notes   OH ria    Wall slides  Gume. well   Wall sleep stretch  Gume. Well    UBE    Corner stretch 3 way  10 breath cycles ea               HEP 2/17 ADDED     Manual Intervention  (79851)  Min:10     GHJ mobs BL   PROM     STM                   NMR re-education (05872)  Min: 30     Scap stab.        T slide rows  Blue15 x 2 Corner push outs 5\" 15 x   T     RC activation at end range      S    Corner push   T slide    IR Green 30 x L/R ea  ER yellow 30 x   L/R ea   Open can 2# 10 x 3   W red 10 x 3                      Therapeutic Activity (45398)  Min:     dressing 2/1 advised pt dress with button up and zipper shirts and avoid pullovers due to pain w/ dressing, pt verbalized understanding          Modalities:  Min                 Other Therapeutic Activities:  1/18/23 Pt was educated on PT POC, Diagnosis, Prognosis, pathomechanics as well as frequency and duration of scheduling future physical therapy appointments. Time was also taken on this day to answer all patient questions and participation in PT. Reviewed appointment policy in detail with patient and patient verbalized understanding. Home Exercise Program:  HEP instruction: Access Code: [email or web address]  URL: [email or web address]  Date: 01/18/2023  Prepared by: Rory Muriel     Exercises  Seated Shoulder Flexion Towel Slide at Table Top - 2-3 x daily - 7 x weekly - 1-2 sets - 10 reps - 5-10 hold  Seated Shoulder Abduction Towel Slide at Table Top - 2-3 x daily - 7 x weekly - 1-2 sets - 10 reps - 5-10 hold  Seated Shoulder External Rotation PROM on Table - 2-3 x daily - 7 x weekly - 1-2 sets - 10 reps - 5-10 hold  The patient demonstrated good tolerance to and understanding of the HEP. Written instructions have been issued. Access Code: [email or web address]  URL: [email or web address]  Date: 01/25/2023  Prepared by: Sacr Monzon    Exercises  Standing shoulder flexion wall slides - 1 x daily - 7 x weekly - 3 sets - 10 reps - 5 hold  Access Code: [email or web address]  URL: [email or web address]  Date: 02/17/2023  Prepared by: Rory Muriel    Exercises  Corner Pec Minor Stretch - 1 x daily - 3 x weekly - 1 sets - 2 reps - 30 hold  Corner Pec Major Stretch - 1 x daily - 3 x weekly - 1 sets - 2 reps - 30 hold  Doorway Pec Stretch at 120 Degrees Abduction - 1 x daily - 3 x weekly - 1 sets - 2 reps - 30 hold  Corner Mid Trap Push-Outs - 1 x daily - 3 x weekly - 3 sets - 10 reps - 5 hold  Shoulder Internal Rotation with Resistance - 1 x daily - 3 x weekly - 3 sets - 10 reps - 1 hold  Shoulder External Rotation with Anchored Resistance - 1 x daily - 3 x weekly - 3 sets - 10 reps - 1 hold  Shoulder W - External Rotation with Resistance - 1 x daily - 3 x weekly - 3 sets - 10 reps - 3 hold  The patient demonstrated good tolerance to and understanding of the HEP. Written instructions have been issued. Therapeutic Exercise and NMR EXR  [] (14592) Provided verbal/tactile cueing for activities related to strengthening, flexibility, endurance, ROM  for improvements in scapular, scapulothoracic and UE control with self care, reaching, carrying, lifting, house/yardwork, driving/computer work.    [] (52193) Provided verbal/tactile cueing for activities related to improving balance, coordination, kinesthetic sense, posture, motor skill, proprioception  to assist with  scapular, scapulothoracic and UE control with self care, reaching, carrying, lifting, house/yardwork, driving/computer work. Therapeutic Activities:    [] (56540 or 98900) Provided verbal/tactile cueing for activities related to improving balance, coordination, kinesthetic sense, posture, motor skill, proprioception and motor activation to allow for proper function of scapular, scapulothoracic and UE control with self care, carrying, lifting, driving/computer work.      Home Exercise Program:    [x] (97448) Reviewed/Progressed HEP activities related to strengthening, flexibility, endurance, ROM of scapular, scapulothoracic and UE control with self care, reaching, carrying, lifting, house/yardwork, driving/computer work  [] (50446) Reviewed/Progressed HEP activities related to improving balance, coordination, kinesthetic sense, posture, motor skill, proprioception of scapular, scapulothoracic and UE control with self care, reaching, carrying, lifting, house/yardwork, driving/computer work      Manual Treatments:  PROM / STM / Oscillations-Mobs:  G-I, II, III, IV (PA's, Inf., Post.)  [] (56155) Provided manual therapy to mobilize soft tissue/joints of cervical/CT, scapular GHJ and UE for the purpose of modulating pain, promoting relaxation,  increasing ROM, reducing/eliminating soft tissue swelling/inflammation/restriction, improving soft tissue extensibility and allowing for proper ROM for normal function with self care, reaching, carrying, lifting, house/yardwork, driving/computer work    Charges:  Timed Code Treatment Minutes: 45   Total Treatment Minutes: 50       [] EVAL (LOW) 64506 (typically 20 minutes face-to-face)  [] EVAL (MOD) 72746 (typically 30 minutes face-to-face)  [] EVAL (HIGH) 48666 (typically 45 minutes face-to-face)  [] RE-EVAL     [x] IS(05562) x     [] Dry needle 1 or 2 Muscles (88690)  [x] NMR (78256) x  2   [] Dry needle 3+ Muscles (33390)  [] Manual (80361) x    [] Ultrasound (95033) x  [] TA (51090) x     [] Mech Traction (64616)  [] ES(attended) (61286)     [] ES (un) (02900):   [] Vasopump (85036) [] Ionto (12181)   [] Other:    If [facility] Please Indicate Time In/Out  CPT Code Time in Time out                                   Approval Dates:  CPT Code Units Approved Units Used  Date Updated:                     Willem Cox stated goal: sleep better and improve mobility   [] Progressing: [x] Met: [] Not Met: [] Adjusted     Therapist goals for Patient:   Short Term Goals: To be achieved in: 2 weeks  1. Independent in HEP and progression per patient tolerance, in order to prevent re-injury. [] Progressing: [x] Met: [] Not Met: [] Adjusted  2. Patient will have a decrease in pain 5/10 to facilitate improvement in movement, function, and ADLs as indicated by Functional Deficits. [] Progressing: [x] Met: [] Not Met: [] Adjusted     Long Term Goals: To be achieved in: 4-6 weeks  1.   Increase FOTO functional outcome score from 69 to 75  to assist with reaching prior level of function. [] Progressing: [x] Met: [] Not Met: [] Adjusted  2. Patient will demonstrate increased AROM to to wfl's at bilateral shoulders  to allow for proper joint functioning as indicated by Functional Deficits. [] Progressing: [x] Met: [] Not Met: [] Adjusted  3. Patient will demonstrate an increase in NM recruitment/activation and overall GH and scapular strength to within n5lbs HHD or WNL for proper functional mobility as indicated by patients Functional Deficits. [] Progressing: [x] Met: [] Not Met: [] Adjusted  4. Patient will return to reaching for hygiene, dressing and household   activities without increased symptoms or restriction. [] Progressing: [x] Met: [] Not Met: [] Adjusted  5. Patient will have a decrease in pain 0-2/10 to facilitate improvement in movement, function, and ADLs as indicated by Functional Deficits. [] Progressing: [x] Met: [] Not Met: [] Adjusted    ASSESSMENT:    2/3/23 Pt tolerated gentle manual rx and NMR well today post. L and R shoulder cortisone injections    Treatment/Activity Tolerance:  [x] Patient tolerated treatment well [] Patient limited by fatique  [] Patient limited by pain  [] Patient limited by other medical complications  [] Other:     Overall Progression Towards Functional goals/ Treatment Progress Update:  [x] Patient is progressing as expected towards functional goals listed. [] Progression is slowed due to complexities/Impairments listed. [] Progression has been slowed due to co-morbidities.   [] Plan just implemented, too soon to assess goals progression <30days   [] Goals require adjustment due to lack of progress  [] Patient is not progressing as expected and requires additional follow up with physician  [] Other    Prognosis for POC: [x] Good [] Fair  [] Poor    Patient requires continued skilled intervention: [] Yes  [x] No      PLAN:                                     Recover rom and function at bilateral shoulders focus  L>R due to greater limitations , cont. As above to facilitate static and dynamic control at shoulders    [] Continue per plan of care [] Alter current plan (see comments)  [] Plan of care initiated [] Hold pending MD visit [x] Discharge    Electronically signed by: Julián Castillo PT     Note: If patient does not return for scheduled/recommended follow up visits, this note will serve as a discharge from care along with the most recent update on progress.

## 2023-02-21 ENCOUNTER — APPOINTMENT (OUTPATIENT)
Dept: PHYSICAL THERAPY | Age: 83
End: 2023-02-21
Payer: COMMERCIAL

## 2023-02-24 ENCOUNTER — APPOINTMENT (OUTPATIENT)
Dept: PHYSICAL THERAPY | Age: 83
End: 2023-02-24
Payer: COMMERCIAL

## 2023-02-28 ASSESSMENT — ENCOUNTER SYMPTOMS
COUGH: 0
RHINORRHEA: 0
CHEST TIGHTNESS: 0
BACK PAIN: 0
NAUSEA: 0
SORE THROAT: 0
EYE REDNESS: 0
VOMITING: 0
EYE DISCHARGE: 0
ABDOMINAL PAIN: 0

## 2023-02-28 NOTE — PROGRESS NOTES
Simin Pina (:  1940) is a [de-identified] y.o. male,Established patient, here for evaluation of the following chief complaint(s):  6 Month Follow-Up    Past medical chronic health issues include history neck  melanoma, partial colectomy and cystectomy for diverticulitis and fissure, assymptomatic aortic stenosis/AI and calcification of mitral valve, colonic polyps, hyperlipidemia, BPH,  gout, stable pulm nodule, hypertension, A1c 6.4, BMI 35, cholelithiasis, adhesive capsulitis. # Pulmonary nodules. 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 History of bronchitis. Pulmonary functions at the [place]. CT abdomen and chest with calcifications and 8 mm nodule right lower lobe. Positive asbestos exposure. Yearly Ct. Follows with pulm . History of chronic rhinitis  Last CT with out contrast done in  showed stable nodules. # Mildly elevated liver function tests with negative hepatitis C, TIBC. History of alcohol and obesity. Resolved prediabetes with A1c down to 5.2021. Last liver functions normal .  Lost nearly 50 pounds with diet and exercise     # Asymptomatic gallstones. Has seen surgeon. Aware of symptoms     # History of prediabetes. Last check normal.  Weight is down. 1/2 miles 4 times per     # Hx melanoma- dx on    Yearly follow-up. 3.1081 with 2 mole excision neg  pathology recently. # Hypertension. Echocardiogram 2018 with trivial aortic stenosis/regurgitation mitral and tricuspid regurgitation. Ejection fraction 60-65%. Mild increased left ventricular wall thickness. No syncope, exercise intolerance or edema. No TIA-like symptoms. . Reports stress thallium normal in . Denies syncope or presyncope is on lisinopril hydrochlorothiazide. Vytorin. LDL 67. Sister with valve disease  2021    # Nonrheumatic aortic valve stenosis-and asymptomatic.   Stable and follows cardiologist     # History diverticulosis partial colectomy and cystectomy for fistulas. Occasional right lower quadrant discomfort. No blood in the stool or vomiting. Last colonoscopy 2013. No GE reflux. No constipation. popcorn causes pain. # Gout well-controlled with allopurinol. Last flare 94. # Recent elevation of PSA now down to 1.6  Flomax. Prostatism. No longer in following with urologist.  Andry Quails about switching from a max to another medication. Some difficulty with ejaculation. Flomax has been helpful for urine flow. Discussed about avoiding drinking water prior to going bed. # [place] 2021 Dr. 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 Facet osteoarthritis atherosclerotic calcification of the aorta     # hernia repair left- three times, improved after mesh repair. fatty tumor forearm  Sigmoid colectomy and blader repair. Partial cystectomy  Ventral hernia repair with mesh     # BPH- flomax. #History of capsulitis, rotator cuff tendinitis, bilateral shoulder AC joint arthritis, completed physical therapy and continuing to exercise at home. Was seen by Dr. Violet Pyle, underwent steroidal injection     # Wife  2021 CLL, atypical Mycobacterium and cirrhosis. Remarried on 2022 and happ. Cardiologist- Tam Villalba     ASSESSMENT/PLAN:    1. Essential hypertension, benign-stable, continue with Prinzide daily. BP Readings from Last 3 Encounters:   23 130/80   23 115/70   23 134/70   -     Basic Metabolic Panel; Future    2. Cramp in lower leg-we will check for his electrolytes. Patient has been taking over-the-counter magnesium. Advised the patient to stay hydrated. -     Magnesium; Future  -     Basic Metabolic Panel; Future  -     CBC with Auto Differential; Future  -     Hemoglobin A1C; Future    3. Mixed hyperlipidemia-currently on Vytorin. Continue the same.   Will repeat his labs  Lab Results   Component Value Date    CHOL 134 2021    CHOL 121 2020    CHOL 120 2019 Lab Results   Component Value Date    TRIG 100 08/24/2021    TRIG 139 06/29/2020    TRIG 110 12/17/2019     Lab Results   Component Value Date    HDL 52 09/01/2022    HDL 47 08/24/2021    HDL 38 (L) 06/29/2020     Lab Results   Component Value Date    LDLCALC 136 (H) 09/01/2022    LDLCALC 67 08/24/2021    LDLCALC 55 06/29/2020     Lab Results   Component Value Date    LABVLDL 22 09/01/2022    LABVLDL 20 08/24/2021    LABVLDL 28 06/29/2020     No results found for: CHOLHDLRATIO    -     Lipid, Fasting; Future  4. Ascending aorta dilation (HCC)-patient currently is asymptomatic, continue with statin. Following with cardiologist regularly. 5. Malignant melanoma of neck ([address]- status post removal from his right upper back around 2013. Follows up with dermatologist on regular basis  6. Left carpal tunnel syndrome- positive Phalen sign. advised him to wear wrist brace/band. Take tylenol as needed. 7. Prediabetic- will check for hba1c. Continue with diet modification. Return in about 6 months (around 9/1/2023). Subjective   SUBJECTIVE/OBJECTIVE:  HPI    Review of Systems   Constitutional:  Negative for chills, fatigue and fever. HENT:  Negative for congestion, ear pain, rhinorrhea and sore throat. Eyes:  Negative for discharge, redness and visual disturbance. Respiratory:  Negative for cough and chest tightness. Cardiovascular:  Negative for chest pain, palpitations and leg swelling. Gastrointestinal:  Negative for abdominal pain, nausea and vomiting. Endocrine: Negative. Genitourinary:  Negative for dysuria. Musculoskeletal:  Negative for back pain and gait problem. Skin: Negative. Neurological:  Negative for syncope, facial asymmetry, speech difficulty, light-headedness and headaches. Objective   Physical Exam  Vitals reviewed. Constitutional:       Appearance: Normal appearance. HENT:      Head: Normocephalic.    Eyes:      Extraocular Movements: Extraocular movements intact. Pupils: Pupils are equal, round, and reactive to light. Cardiovascular:      Rate and Rhythm: Normal rate. Heart sounds: Normal heart sounds. No murmur heard. Pulmonary:      Effort: Pulmonary effort is normal.      Breath sounds: Normal breath sounds. Abdominal:      General: Bowel sounds are normal.      Palpations: Abdomen is soft. Musculoskeletal:         General: No swelling, tenderness, deformity or signs of injury. Normal range of motion. Comments: positive Phalen sign. Skin:     General: Skin is warm. Neurological:      General: No focal deficit present. Mental Status: He is alert and oriented to person, place, and time. Mental status is at baseline. Psychiatric:         Mood and Affect: Mood normal.              Please note that this chart was generated using dragon dictation software. Although every effort was made to ensure the accuracy of this automated transcription, some errors in transcription may have occurred. An electronic signature was used to authenticate this note.     --Franki Vasquez MD

## 2023-03-01 ENCOUNTER — OFFICE VISIT (OUTPATIENT)
Dept: INTERNAL MEDICINE CLINIC | Age: 83
End: 2023-03-01

## 2023-03-01 VITALS
SYSTOLIC BLOOD PRESSURE: 130 MMHG | DIASTOLIC BLOOD PRESSURE: 80 MMHG | TEMPERATURE: 97.7 F | BODY MASS INDEX: 30.01 KG/M2 | WEIGHT: 191.6 LBS

## 2023-03-01 DIAGNOSIS — E78.2 MIXED HYPERLIPIDEMIA: ICD-10-CM

## 2023-03-01 DIAGNOSIS — C43.4 MALIGNANT MELANOMA OF NECK (HCC): ICD-10-CM

## 2023-03-01 DIAGNOSIS — I77.810 ASCENDING AORTA DILATION (HCC): ICD-10-CM

## 2023-03-01 DIAGNOSIS — R25.2 CRAMP IN LOWER LEG: ICD-10-CM

## 2023-03-01 DIAGNOSIS — G56.02 LEFT CARPAL TUNNEL SYNDROME: ICD-10-CM

## 2023-03-01 DIAGNOSIS — I10 ESSENTIAL HYPERTENSION, BENIGN: Primary | ICD-10-CM

## 2023-03-01 DIAGNOSIS — I10 PRIMARY HYPERTENSION: ICD-10-CM

## 2023-03-01 DIAGNOSIS — R73.03 PRE-DIABETES: ICD-10-CM

## 2023-03-01 LAB
ANION GAP SERPL CALCULATED.3IONS-SCNC: 11 MMOL/L (ref 3–16)
BASOPHILS ABSOLUTE: 0.1 K/UL (ref 0–0.2)
BASOPHILS RELATIVE PERCENT: 0.8 %
BUN BLDV-MCNC: 31 MG/DL (ref 7–20)
CALCIUM SERPL-MCNC: 9.4 MG/DL (ref 8.3–10.6)
CHLORIDE BLD-SCNC: 100 MMOL/L (ref 99–110)
CHOLESTEROL, FASTING: 134 MG/DL (ref 0–199)
CO2: 28 MMOL/L (ref 21–32)
CREAT SERPL-MCNC: 1.1 MG/DL (ref 0.8–1.3)
EOSINOPHILS ABSOLUTE: 0.2 K/UL (ref 0–0.6)
EOSINOPHILS RELATIVE PERCENT: 2.4 %
GFR SERPL CREATININE-BSD FRML MDRD: >60 ML/MIN/{1.73_M2}
GLUCOSE BLD-MCNC: 80 MG/DL (ref 70–99)
HCT VFR BLD CALC: 45.1 % (ref 40.5–52.5)
HDLC SERPL-MCNC: 55 MG/DL (ref 40–60)
HEMOGLOBIN: 14.7 G/DL (ref 13.5–17.5)
LDL CHOLESTEROL CALCULATED: 69 MG/DL
LYMPHOCYTES ABSOLUTE: 1.4 K/UL (ref 1–5.1)
LYMPHOCYTES RELATIVE PERCENT: 16.2 %
MAGNESIUM: 2.1 MG/DL (ref 1.8–2.4)
MCH RBC QN AUTO: 31 PG (ref 26–34)
MCHC RBC AUTO-ENTMCNC: 32.7 G/DL (ref 31–36)
MCV RBC AUTO: 94.7 FL (ref 80–100)
MONOCYTES ABSOLUTE: 0.5 K/UL (ref 0–1.3)
MONOCYTES RELATIVE PERCENT: 6.2 %
NEUTROPHILS ABSOLUTE: 6.3 K/UL (ref 1.7–7.7)
NEUTROPHILS RELATIVE PERCENT: 74.4 %
PDW BLD-RTO: 15.1 % (ref 12.4–15.4)
PLATELET # BLD: 176 K/UL (ref 135–450)
PMV BLD AUTO: 8.7 FL (ref 5–10.5)
POTASSIUM SERPL-SCNC: 4.5 MMOL/L (ref 3.5–5.1)
RBC # BLD: 4.76 M/UL (ref 4.2–5.9)
SODIUM BLD-SCNC: 139 MMOL/L (ref 136–145)
TRIGLYCERIDE, FASTING: 50 MG/DL (ref 0–150)
VLDLC SERPL CALC-MCNC: 10 MG/DL
WBC # BLD: 8.5 K/UL (ref 4–11)

## 2023-03-02 DIAGNOSIS — I10 ESSENTIAL HYPERTENSION, BENIGN: ICD-10-CM

## 2023-03-02 LAB
ESTIMATED AVERAGE GLUCOSE: 116.9 MG/DL
HBA1C MFR BLD: 5.7 %

## 2023-03-02 RX ORDER — LISINOPRIL AND HYDROCHLOROTHIAZIDE 20; 12.5 MG/1; MG/1
TABLET ORAL
Qty: 90 TABLET | Refills: 2 | Status: SHIPPED | OUTPATIENT
Start: 2023-03-02 | End: 2023-04-17 | Stop reason: SDUPTHER

## 2023-03-13 ENCOUNTER — TELEPHONE (OUTPATIENT)
Dept: FAMILY MEDICINE CLINIC | Age: 83
End: 2023-03-13

## 2023-03-16 ENCOUNTER — TELEPHONE (OUTPATIENT)
Dept: FAMILY MEDICINE CLINIC | Age: 83
End: 2023-03-16

## 2023-03-17 ENCOUNTER — OFFICE VISIT (OUTPATIENT)
Dept: CARDIOLOGY CLINIC | Age: 83
End: 2023-03-17
Payer: COMMERCIAL

## 2023-03-17 VITALS
BODY MASS INDEX: 30.61 KG/M2 | HEART RATE: 97 BPM | SYSTOLIC BLOOD PRESSURE: 138 MMHG | DIASTOLIC BLOOD PRESSURE: 82 MMHG | HEIGHT: 67 IN | OXYGEN SATURATION: 98 % | WEIGHT: 195 LBS

## 2023-03-17 DIAGNOSIS — I77.810 ASCENDING AORTA DILATION (HCC): ICD-10-CM

## 2023-03-17 DIAGNOSIS — I10 ESSENTIAL HYPERTENSION: ICD-10-CM

## 2023-03-17 DIAGNOSIS — R00.2 PALPITATIONS: ICD-10-CM

## 2023-03-17 DIAGNOSIS — I35.0 AORTIC STENOSIS, MILD: Primary | ICD-10-CM

## 2023-03-17 DIAGNOSIS — E78.5 HYPERLIPIDEMIA LDL GOAL <130: ICD-10-CM

## 2023-03-17 PROCEDURE — 93000 ELECTROCARDIOGRAM COMPLETE: CPT | Performed by: INTERNAL MEDICINE

## 2023-03-17 PROCEDURE — G8484 FLU IMMUNIZE NO ADMIN: HCPCS | Performed by: INTERNAL MEDICINE

## 2023-03-17 PROCEDURE — 3079F DIAST BP 80-89 MM HG: CPT | Performed by: INTERNAL MEDICINE

## 2023-03-17 PROCEDURE — 99214 OFFICE O/P EST MOD 30 MIN: CPT | Performed by: INTERNAL MEDICINE

## 2023-03-17 PROCEDURE — 1036F TOBACCO NON-USER: CPT | Performed by: INTERNAL MEDICINE

## 2023-03-17 PROCEDURE — 3075F SYST BP GE 130 - 139MM HG: CPT | Performed by: INTERNAL MEDICINE

## 2023-03-17 PROCEDURE — G8427 DOCREV CUR MEDS BY ELIG CLIN: HCPCS | Performed by: INTERNAL MEDICINE

## 2023-03-17 PROCEDURE — 1123F ACP DISCUSS/DSCN MKR DOCD: CPT | Performed by: INTERNAL MEDICINE

## 2023-03-17 PROCEDURE — G8417 CALC BMI ABV UP PARAM F/U: HCPCS | Performed by: INTERNAL MEDICINE

## 2023-03-17 NOTE — PROGRESS NOTES
[address] [facility]  1940    March 17, 2023    Reason for Consult: AS    CC: \"I'm feeling great. \"     HPI:  The patient is [de-identified] y.o. male with a past medical history significant for hypertension, aortic stenosis and hyperlipidemia. He presented as referral from Dr. Radha Mary regarding aortic stenosis. He has had echocardiograms beginning in 2015 to monitor his AS and dilated aortic root. He presents today for a follow up. He states overall he is doing well. He denies any new cardiac complaints and states he continues to remain active without any exertional symptoms. He denies any chest pains or worsening shortness of breath. He reports compliance with his medications and tolerating. He denies any abnormal bleeding or bruising. His main complaint is chronic shoulder pain and following with orthopedics. Patient denies exertional chest pain/pressure, dyspnea at rest, worsening MCINTOSH, PND, orthopnea, palpitations, lightheadedness, weight changes, changes in LE edema, and syncope. Review of Systems:  Constitutional: No fatigue, weakness, night sweats or fever. HEENT: No new vision difficulties or ringing in the ears. Respiratory: No new SOB, PND, orthopnea or cough. Cardiovascular: See HPI   GI: No n/v, diarrhea, constipation, abdominal pain or changes in bowel habits. No melena, no hematochezia  : No urinary frequency, urgency, incontinence, hematuria or dysuria. Skin: No cyanosis or skin lesions. Musculoskeletal: No new muscle or joint pain. Neurological: No syncope or TIA-like symptoms.   Psychiatric: No anxiety, insomnia or depression     Past Medical History:   Diagnosis Date    Abnormal breath sounds 12/3/2019    Aortic stenosis, mild 03/27/2018    Echo 3/30/18 - mild  with preserved EF    Calculus of gallbladder without cholecystitis without obstruction 12/27/2019    Colitis     Dermatitis 6/24/2019    Diverticulitis     Diverticulosis     Elevated liver enzymes 2018    Essential hypertension, benign     Gout     Hyperlipidemia     Malignant melanoma of neck (HCC)     Obesity, Class I, BMI 30.0-34.9 (see actual BMI) 2019    Overweight with body mass index (BMI) 25.0-29.9 2019    Prostatism 2019    Follows with urology q 6 month psa. . 1.6     Pulmonary nodule 2019    Needs CT      PVC's (premature ventricular contractions)      Past Surgical History:   Procedure Laterality Date    BLADDER REPAIR  1998    partial cystectomy    [facility],     SKIN CANCER EXCISION  2013    TONSILLECTOMY  89    TUMOR REMOVAL  1970    right forearm     Family History   Problem Relation Age of Onset    Stroke Mother 80    Heart Failure Father 66    Other Sister 24        mastoiditis/ear infection    Cancer Sister 68        oldest sister/breast/brain    Cancer Sister 76    Heart Failure Brother     Heart Failure Brother 68        older brother   His father has a history of heart attack and passed in his [de-identified] and he was a smoker. Mikeyhaja Diaz was a previous smoker and quit in . He believes his brother passed from a heart attack in his 62s and was also a smoker. Social History     Tobacco Use    Smoking status: Former     Packs/day: 1.00     Years: 18.00     Pack years: 18.00     Types: Cigarettes     Quit date:      Years since quittin.2    Smokeless tobacco: Never   Vaping Use    Vaping Use: Never used   Substance Use Topics    Alcohol use: Yes     Alcohol/week: 3.0 - 4.0 standard drinks     Types: 3 - 4 Standard drinks or equivalent per week     Comment: every other day    Drug use: No     Allergies   Allergen Reactions    Guaifenesin & Derivatives      Nervous. Metamucil [Psyllium]      Chest pain. Norflex Tablets [Orphenadrine]      Urination issues.      Current Outpatient Medications   Medication Sig Dispense Refill    MAGNESIUM PO Take 250 mg by mouth lisinopril-hydroCHLOROthiazide (PRINZIDE;ZESTORETIC) 20-12.5 MG per tablet TAKE TWO TABLETS BY MOUTH DAILY 90 tablet 2    lidocaine (LIDODERM) 5 % Place 1 patch onto the skin daily for 20 doses 12 hours on, 12 hours off. 20 patch 0    sodium chloride (ALTAMIST SPRAY) 0.65 % nasal spray 1 spray by Nasal route as needed for Congestion 1 each 3    allopurinol (ZYLOPRIM) 300 MG tablet TAKE ONE TABLET BY MOUTH DAILY 90 tablet 3    ezetimibe-simvastatin (VYTORIN) 10-40 MG per tablet TAKE ONE TABLET BY MOUTH DAILY 90 tablet 2    tamsulosin (FLOMAX) 0.4 MG capsule Take 1 capsule by mouth daily 90 capsule 1    hydrocortisone (ANUSOL-HC) 2.5 % CREA rectal cream To be applied locally. 28 g 5    EPINEPHrine (EPIPEN 2-BESS) 0.3 MG/0.3ML SOAJ injection Use as directed for allergic reaction 1 each 0    azelastine (ASTELIN) 0.1 % nasal spray 2 puffs to nose tid as needed 1 each 0    hydrocortisone (LOCOID) 0.1 % OINT oitment APPLY UP TO TWICE A DAY AS NEEDED FOR RASH 45 g 0    Omega-3 Fatty Acids (FISH OIL) 1000 MG CAPS Take 1,000 mg by mouth 2 times daily       No current facility-administered medications for this visit. Physical Exam:   /82   Pulse 97   Ht 5' 7\" (1.702 m)   Wt 195 lb (88.5 kg)   SpO2 98%   BMI 30.54 kg/m²   No intake or output data in the 24 hours ending 03/17/23 1021  Wt Readings from Last 2 Encounters:   03/17/23 195 lb (88.5 kg)   03/01/23 191 lb 9.6 oz (86.9 kg)     Constitutional: He is oriented to person, place, and time. He appears well-developed and well-nourished. In no acute distress. Head: Normocephalic and atraumatic. Neck: Neck supple. No JVD present. Carotid bruit is not present. No mass and no thyromegaly present. No lymphadenopathy present. Cardiovascular: Normal rate, regular rhythm, normal heart sounds and intact distal pulses. Exam reveals no gallop and no friction rub. No murmur heard. Pulmonary/Chest: Effort normal and breath sounds normal. No respiratory distress.  He has no wheezes, rhonchi or rales. Abdominal: Soft, non-tender. Bowel sounds and aorta are normal. He exhibits no organomegaly, mass or bruit. Extremities: No edema, cyanosis, or clubbing. Pulses are 2+ radial/carotid/dorsalis pedis and posterior tibial bilaterally. Neurological: He is alert and oriented to person, place, and time. He has normal reflexes. No cranial nerve deficit. Coordination normal.   Skin: Skin is warm and dry. There is no rash or diaphoresis. Psychiatric: He has a normal mood and affect. His speech is normal and behavior is normal.     Personally reviewed and interpreted   EKG Interpretation 3/29/22: Normal sinus rhythm with normal axis and intervals  EKG Interpretation 3/17/23: Sinus tachycardia. Imaging:     Echo 4/23/15 ([place])   Left ventricle: The cavity size was normal. Wall thickness was   normal. Systolic function was normal. The estimated ejection   fraction was in the range of 50% to 55%. Wall motion was normal;   there were no regional wall motion abnormalities. Features are   consistent with a pseudonormal left ventricular filling pattern,   with concomitant abnormal relaxation and increased filling   pressure (grade 2 diastolic dysfunction). Aortic valve:   Transvalvular velocity was minimally increased. There was mild   stenosis. Trivial regurgitation. Valve area: 1.88cm^2(VTI). Left   atrium: The atrium was mildly to moderately dilated. Echo 3/30/18  Normal left ventricle size and systolic function with an estimated ejection  fraction of 60-65%. No regional wall motion abnormalities are seen. There is  mildly increased left ventricular wall thickness. The right ventricle is normal in size and function. Mild aortic stenosis with a peak velocity of 2.8 m/s and a mean pressure  gradient of 18 mmHg. Trivial aortic, mitral, and tricuspid regurgitation.      Echo 6/10/20  Left ventricular cavity size is normal with mild concentric left ventricular hypertrophy. Ejection fraction is visually estimated to be 60%. Grade II diastolic dysfunction with elevated LV filling pressures. Calcification of the mitral valve noted. Trivial mitral regurgitation is present. No evidence of mitral stenosis. The left atrium is mild to moderate dilated. Mild aortic stenosis with a peak velocity of 330m/s and a mean pressure  gradient of 15mmHg. Trivial aortic regurgitation. The ascending aorta is mildly dilated & measures at 4.2 cms  The aortic root is normal in size. Chest CT 7/22/21  Mediastinum: The heart is enlarged. There is moderate atherosclerotic   calcification of the coronary arteries and of the aorta. CT chest 4/5/22 [facility])   1. 4.2 cm aortic root dilatation. 2. Chronic interstitial lung disease. 3. Unchanged mediastinal adenopathy either due to reactive or granulomatous disease. Lab Review:   Lab Results   Component Value Date/Time    TRIG 100 08/24/2021 09:55 AM    HDL 55 03/01/2023 08:44 AM    LDLCALC 69 03/01/2023 08:44 AM    LABVLDL 10 03/01/2023 08:44 AM     Lab Results   Component Value Date/Time     03/01/2023 08:44 AM    K 4.5 03/01/2023 08:44 AM    BUN 31 03/01/2023 08:44 AM    CREATININE 1.1 03/01/2023 08:44 AM     Assessment:  1. Aortic stenosis, mild   2. Essential hypertension  3. Hyperlipidemia with LDL goal <130 mg/dL  4. Palpitations  5. Ascending Aorta Dilatation    Plan:  The aortic stenosis noted on his prior echocardiogram continues to be mild for several years and he is asymptomatic. I will have him repeat the echocardiogram to reassess the AS. His ascending aorta was seen to be dilated on his echo and measuring 4.2 cm per chest CT which remains unchanged. His blood pressure is well controlled today in the office. He may hold the Vytorin for a few weeks to see if the shoulder pain improves. I doubt this is the cause though. If no improvement, I instructed him to resume and continue follow up with Orthopaedics. I have encouraged him to participate in aerobic exercise and adhere to a heart healthy diet. I have personally reviewed all previous testing for this visit today including imaging, lab results and EKG as detailed above. I will see him in the office for follow up in 1 year. This note was scribed in the presence of Dr Sami Bermudez MD by Tia Simon RN. Physician Attestation:  The scribes documentation has been prepared under my direction and personally reviewed by me in its entirety. I, Dr. Sami Bermudez personally performed the services described in this documentation as scribed by my RN in my presence, and I confirm that the note above accurately reflects all work, treatment, procedures, and medical decision making performed by me.

## 2023-03-21 ENCOUNTER — TELEPHONE (OUTPATIENT)
Dept: ORTHOPEDIC SURGERY | Age: 83
End: 2023-03-21

## 2023-03-21 NOTE — TELEPHONE ENCOUNTER
General Question     Subject: THE INJECTIONS WORE OFF AND THE PATIENT IS WONDERING IF HE SHOULD DO AN MRI. PLEASE ADVISE.   Patient: Luciana Higuera"  Contact Number: [phone]

## 2023-03-21 NOTE — TELEPHONE ENCOUNTER
I spoke with the patient to inform him of Dr. Branden Pratt message. The patient is already scheduled to see Dr. Beatrice Pope on Thursday.

## 2023-03-21 NOTE — TELEPHONE ENCOUNTER
Per Dr. Dayton Guzmán, he would like the patient to follow up in the office. I attempted to call the patient. The phone was busy, no answer, no machine.

## 2023-03-23 ENCOUNTER — OFFICE VISIT (OUTPATIENT)
Dept: ORTHOPEDIC SURGERY | Age: 83
End: 2023-03-23
Payer: COMMERCIAL

## 2023-03-23 ENCOUNTER — TELEPHONE (OUTPATIENT)
Dept: ORTHOPEDIC SURGERY | Age: 83
End: 2023-03-23

## 2023-03-23 VITALS — HEIGHT: 67 IN | WEIGHT: 195 LBS | BODY MASS INDEX: 30.61 KG/M2 | RESPIRATION RATE: 16 BRPM

## 2023-03-23 DIAGNOSIS — M75.101 TEAR OF RIGHT ROTATOR CUFF, UNSPECIFIED TEAR EXTENT, UNSPECIFIED WHETHER TRAUMATIC: Primary | ICD-10-CM

## 2023-03-23 PROCEDURE — G8427 DOCREV CUR MEDS BY ELIG CLIN: HCPCS | Performed by: ORTHOPAEDIC SURGERY

## 2023-03-23 PROCEDURE — 1036F TOBACCO NON-USER: CPT | Performed by: ORTHOPAEDIC SURGERY

## 2023-03-23 PROCEDURE — 1123F ACP DISCUSS/DSCN MKR DOCD: CPT | Performed by: ORTHOPAEDIC SURGERY

## 2023-03-23 PROCEDURE — 99213 OFFICE O/P EST LOW 20 MIN: CPT | Performed by: ORTHOPAEDIC SURGERY

## 2023-03-23 PROCEDURE — G8417 CALC BMI ABV UP PARAM F/U: HCPCS | Performed by: ORTHOPAEDIC SURGERY

## 2023-03-23 PROCEDURE — G8484 FLU IMMUNIZE NO ADMIN: HCPCS | Performed by: ORTHOPAEDIC SURGERY

## 2023-03-23 RX ORDER — CELECOXIB 200 MG/1
200 CAPSULE ORAL DAILY
Qty: 30 CAPSULE | Refills: 1 | Status: SHIPPED | OUTPATIENT
Start: 2023-03-23

## 2023-03-23 NOTE — TELEPHONE ENCOUNTER
Danielle Fine MA  [facility]    MRI RIGHT SHOULDER no precert required. Received insurance approval for MRI to be scheduled at [facility]. Patient may call [phone] to schedule. Once scheduled, patient should call our office back to make a follow-up appointment to go over the results. Left a detailed VM for pt letting him know.

## 2023-03-27 ENCOUNTER — TELEPHONE (OUTPATIENT)
Dept: ORTHOPEDIC SURGERY | Age: 83
End: 2023-03-27

## 2023-03-27 NOTE — TELEPHONE ENCOUNTER
Last Rx 3/23/23, 30 day supply with 1 refill. MRI is scheduled for 3/31/23. FU appt with Dr Mao Barclay is 4/4/23. Tried calling pt to discuss. Got a recording that \"your party you are calling is busy\". Unable to leave a .

## 2023-03-27 NOTE — TELEPHONE ENCOUNTER
Prescription Refill     Medication Name:  CELECOXIB 200 MG  Pharmacy: [facility] [id number] - [address] [address] [phone]   Patient Contact Number:  [phone]       Pt regarding celecoxib 200mg //Rx is not getting patient relief thru the night. Patient inquiring if he may take 2 a day [email or web address] in the morning & 1 at night.      Please advise

## 2023-03-28 NOTE — TELEPHONE ENCOUNTER
I spoke to pt and let him know, per BPM, okay to take 1 in AM and 1 in PM of the Celebrex, only until his fu appt in the office on 4/4/23. He gave verbal understanding.

## 2023-03-30 ENCOUNTER — OFFICE VISIT (OUTPATIENT)
Dept: FAMILY MEDICINE CLINIC | Age: 83
End: 2023-03-30

## 2023-03-30 VITALS
HEIGHT: 67 IN | BODY MASS INDEX: 30.54 KG/M2 | HEART RATE: 84 BPM | TEMPERATURE: 98.1 F | OXYGEN SATURATION: 98 % | WEIGHT: 194.6 LBS | DIASTOLIC BLOOD PRESSURE: 68 MMHG | SYSTOLIC BLOOD PRESSURE: 110 MMHG

## 2023-03-30 DIAGNOSIS — Z76.89 ENCOUNTER TO ESTABLISH CARE: Primary | ICD-10-CM

## 2023-03-30 DIAGNOSIS — I77.810 ASCENDING AORTA DILATION (HCC): ICD-10-CM

## 2023-03-30 DIAGNOSIS — C43.4 MALIGNANT MELANOMA OF NECK (HCC): ICD-10-CM

## 2023-03-30 DIAGNOSIS — R91.1 PULMONARY NODULE: ICD-10-CM

## 2023-03-30 DIAGNOSIS — I10 ESSENTIAL HYPERTENSION, BENIGN: ICD-10-CM

## 2023-03-30 DIAGNOSIS — R73.03 PREDIABETES: ICD-10-CM

## 2023-03-30 DIAGNOSIS — R35.1 NOCTURIA: ICD-10-CM

## 2023-03-30 DIAGNOSIS — M10.9 GOUT OF FOOT, UNSPECIFIED CAUSE, UNSPECIFIED CHRONICITY, UNSPECIFIED LATERALITY: ICD-10-CM

## 2023-03-30 RX ORDER — TAMSULOSIN HYDROCHLORIDE 0.4 MG/1
0.4 CAPSULE ORAL DAILY
Qty: 90 CAPSULE | Refills: 1 | Status: SHIPPED | OUTPATIENT
Start: 2023-03-30

## 2023-03-30 SDOH — ECONOMIC STABILITY: FOOD INSECURITY: WITHIN THE PAST 12 MONTHS, YOU WORRIED THAT YOUR FOOD WOULD RUN OUT BEFORE YOU GOT MONEY TO BUY MORE.: NEVER TRUE

## 2023-03-30 SDOH — ECONOMIC STABILITY: HOUSING INSECURITY
IN THE LAST 12 MONTHS, WAS THERE A TIME WHEN YOU DID NOT HAVE A STEADY PLACE TO SLEEP OR SLEPT IN A SHELTER (INCLUDING NOW)?: NO

## 2023-03-30 SDOH — ECONOMIC STABILITY: FOOD INSECURITY: WITHIN THE PAST 12 MONTHS, THE FOOD YOU BOUGHT JUST DIDN'T LAST AND YOU DIDN'T HAVE MONEY TO GET MORE.: NEVER TRUE

## 2023-03-30 SDOH — ECONOMIC STABILITY: INCOME INSECURITY: HOW HARD IS IT FOR YOU TO PAY FOR THE VERY BASICS LIKE FOOD, HOUSING, MEDICAL CARE, AND HEATING?: NOT VERY HARD

## 2023-03-30 NOTE — PROGRESS NOTES
PT course with little relief. Seen dr. Keila Redding, had JG in both shoulders with great relief for only a month. Ongoing pain, stiffness and limited ROM R>L. MRI scheduled for 3/31/2023. Currently taking celebrex 200 mg BID until he see's Dr. Keila Redding on Tuesday to discuss MRI results. GI- Dr. Janie Mares placed mesh after GI surgeries due to multiple hernias. Gout- takes 300 mg daily, last gout attack 1995    Skin-2013 melanoma of neck, removed by Janie Mares. Follows with [facility] annually. Lives in modular home, lives alone independent in ADLs. Eats 2 meals a day, no snacking. needs  Current Outpatient Medications   Medication Sig Dispense Refill    tamsulosin (FLOMAX) 0.4 MG capsule Take 1 capsule by mouth daily 90 capsule 1    celecoxib (CELEBREX) 200 MG capsule Take 1 capsule by mouth daily 30 capsule 1    MAGNESIUM PO Take 250 mg by mouth      lisinopril-hydroCHLOROthiazide (PRINZIDE;ZESTORETIC) 20-12.5 MG per tablet TAKE TWO TABLETS BY MOUTH DAILY 90 tablet 2    lidocaine (LIDODERM) 5 % Place 1 patch onto the skin daily for 20 doses 12 hours on, 12 hours off. 20 patch 0    sodium chloride (ALTAMIST SPRAY) 0.65 % nasal spray 1 spray by Nasal route as needed for Congestion 1 each 3    allopurinol (ZYLOPRIM) 300 MG tablet TAKE ONE TABLET BY MOUTH DAILY 90 tablet 3    ezetimibe-simvastatin (VYTORIN) 10-40 MG per tablet TAKE ONE TABLET BY MOUTH DAILY 90 tablet 2    hydrocortisone (ANUSOL-HC) 2.5 % CREA rectal cream To be applied locally. 28 g 5    EPINEPHrine (EPIPEN 2-BESS) 0.3 MG/0.3ML SOAJ injection Use as directed for allergic reaction 1 each 0    azelastine (ASTELIN) 0.1 % nasal spray 2 puffs to nose tid as needed 1 each 0    hydrocortisone (LOCOID) 0.1 % OINT oitment APPLY UP TO TWICE A DAY AS NEEDED FOR RASH 45 g 0    Omega-3 Fatty Acids (FISH OIL) 1000 MG CAPS Take 1,000 mg by mouth 2 times daily       No current facility-administered medications for this visit.         Past Medical

## 2023-03-31 ENCOUNTER — HOSPITAL ENCOUNTER (OUTPATIENT)
Dept: MRI IMAGING | Age: 83
Discharge: HOME | End: 2023-03-31
Payer: COMMERCIAL

## 2023-03-31 DIAGNOSIS — M75.101 TEAR OF RIGHT ROTATOR CUFF, UNSPECIFIED TEAR EXTENT, UNSPECIFIED WHETHER TRAUMATIC: ICD-10-CM

## 2023-03-31 PROCEDURE — 73221 MRI JOINT UPR EXTREM W/O DYE: CPT

## 2023-03-31 ASSESSMENT — ENCOUNTER SYMPTOMS
COUGH: 0
SHORTNESS OF BREATH: 0
ABDOMINAL PAIN: 0

## 2023-04-04 ENCOUNTER — TELEPHONE (OUTPATIENT)
Dept: PULMONOLOGY | Age: 83
End: 2023-04-04

## 2023-04-04 ENCOUNTER — TELEPHONE (OUTPATIENT)
Dept: ORTHOPEDIC SURGERY | Age: 83
End: 2023-04-04

## 2023-04-04 ENCOUNTER — TELEPHONE (OUTPATIENT)
Dept: CARDIOLOGY CLINIC | Age: 83
End: 2023-04-04

## 2023-04-04 ENCOUNTER — OFFICE VISIT (OUTPATIENT)
Dept: ORTHOPEDIC SURGERY | Age: 83
End: 2023-04-04
Payer: COMMERCIAL

## 2023-04-04 ENCOUNTER — TELEPHONE (OUTPATIENT)
Dept: FAMILY MEDICINE CLINIC | Age: 83
End: 2023-04-04

## 2023-04-04 VITALS — HEIGHT: 67 IN | BODY MASS INDEX: 30.45 KG/M2 | WEIGHT: 194 LBS

## 2023-04-04 DIAGNOSIS — S43.431A NONTRAUMATIC TYPE 1 SUPERIOR LABRAL ANTERIOR-TO-POSTERIOR (SLAP) TEAR OF RIGHT SHOULDER, INITIAL ENCOUNTER: ICD-10-CM

## 2023-04-04 DIAGNOSIS — M19.011 ARTHRITIS OF RIGHT ACROMIOCLAVICULAR JOINT: ICD-10-CM

## 2023-04-04 DIAGNOSIS — M75.80 ROTATOR CUFF TENDINITIS, UNSPECIFIED LATERALITY: Primary | ICD-10-CM

## 2023-04-04 PROCEDURE — G8427 DOCREV CUR MEDS BY ELIG CLIN: HCPCS | Performed by: ORTHOPAEDIC SURGERY

## 2023-04-04 PROCEDURE — G8417 CALC BMI ABV UP PARAM F/U: HCPCS | Performed by: ORTHOPAEDIC SURGERY

## 2023-04-04 PROCEDURE — 1123F ACP DISCUSS/DSCN MKR DOCD: CPT | Performed by: ORTHOPAEDIC SURGERY

## 2023-04-04 PROCEDURE — 1036F TOBACCO NON-USER: CPT | Performed by: ORTHOPAEDIC SURGERY

## 2023-04-04 PROCEDURE — 99214 OFFICE O/P EST MOD 30 MIN: CPT | Performed by: ORTHOPAEDIC SURGERY

## 2023-04-04 NOTE — TELEPHONE ENCOUNTER
Auth: NPR  Date: 4/12/23-4/711/23  Reference # [id number]  Spoke with: NONE  Type of SX: OUTPATIENT  Location: [facility]  CPT: 74123 [id number]          DX: M75.80, M19.011, S43.431A  SX area:  [facility]

## 2023-04-04 NOTE — LETTER
be dispensed and administered unless a D.A. W. is written with the medication order  All orders without checkboxes will processed automatically unless crossed out. Orders with checkboxes MUST be checked in order to be carried out. Lee Abernathy Vaibhav                                                        1940  Date of Procedure/Surgery: 4/12/23  H & P for ALL procedure/surgery completed within 30 days, to be updated day of procedure/surgery  [ ]Consults: PT/OT evaluation  [X ]Defer to Anesthesia for Pre-Admission testing orders  Diagnostic Tests:  [ ]EKG (if not completed in last 3 months) IF: (check all that apply)   Other:  [ Shade Ba (if not completed in last 6 months) IF: (check all that apply)   Hx Malignancy   Hx CVS/Thoracic Surg   CVS Disease   Hx Pneumonia last 3 months   Chronic Pulm Disease  Other:  [ ]Radiology   Knee Right Left Standing AP knee, hip to ankle on scoliosis film   Other:  Labs  [ ]Basic Metabolic Profile  IF: (check all that apply)  [ ]Albumin    Other:     __________________________________________________________________  [ ]CBC without diff   Pre op exam      __________________________________________________________________  [ ]PT/INR  PTT   Pre op exam         __________________________________________________________________  [ ]Type & Screen   Surg with potiential for signif.  blood loss  [ ]Type & cross match 2 units         __________________________________________________________________  [ ]Urine routine reflex to culture (UAR) If cultures positive, repeat on                  admission [ Bryanna Meline catch urine  [ ]Nasal culture for MRSA   [ ]Nasal MRSA culture  __________________________________________________________________  [ ]Other:      TO: ___________________________________________ Date: ____________ Time: _________    Physician Signature:          4/4/2023 8:49 AM                   Pre-operative Physician Prophylaxis Orders      Lee Esposito  1940    All orders

## 2023-04-04 NOTE — TELEPHONE ENCOUNTER
Pt calling he is having surgery on 4-12-23 on shoulder with Dr Ruba Camacho and wants to know if you can Addend the office note on 03-30-23 to be a pre-op.  Please advise pt is having surgery at [facility].    [phone] Oxybutynin Counseling:  I discussed with the patient the risks of oxybutynin including but not limited to skin rash, drowsiness, dry mouth, difficulty urinating, and blurred vision.

## 2023-04-04 NOTE — TELEPHONE ENCOUNTER
CARDIAC CLEARANCE     What type of procedure are you having? ARTHROSCOPY LABRAL DEBRIDEMENT, OPEN VALENTÍN, SUBACROMIAL DECOMPRESSION, AND POSSIBLE ROTATOR CUFF REPAIR RIGHT SHOULDER    Which physician is performing your procedure? Sheron Holstein, MD    When is your procedure scheduled for?  4-    Where are you having this procedure? [facility]    Are you taking Blood Thinners? If so what? (Name/dose/frequesncy)  N/A    Does the surgeon want you to stop your blood thinner? If so for how long?   N/A    Phone Number and Contact Name for Physicians office:    Fax number to send information:    Upload to Epic and call Iris's office @ ext. [phone]

## 2023-04-04 NOTE — TELEPHONE ENCOUNTER
Patient calling to see if you feel he needs surgical clearance for his shoulder surgery. He was advised to hold off on his CT Chest until  he can raise is arms for the test.  The ortho doc said just to check with you on this.  He isn't to return here until August.  Please advise

## 2023-04-04 NOTE — TELEPHONE ENCOUNTER
Dr. Roxana Mckoy please review and advise for a cardiac clearance. Thank you. No medications need to be held. Has history of mild aortic stenosis and ascending aortic dilation measuring 4.2 cm.

## 2023-04-04 NOTE — PROGRESS NOTES
Marito Tan  [id number]  April 4, 2023    Chief Complaint   Patient presents with    Follow-up     MRI results right shoulder           History: The patient is here in follow-up regarding both shoulders. The right shoulder injection received a few months ago provided relief briefly. He is now having rather severe pain. He is here to review his MRI results. He now states that the right shoulder is worse. Difficulty with overhead activities. He has difficulty sleeping on his right side. He did try CBD oil without much relief. He has been taking Celebrex twice daily. The patient's  past medical history, medications, allergies,  family history, social history, and review of systems have been reviewed, and dated and are recorded in the chart. Pertinent items are noted in HPI. Review of systems reviewed from Pertinent History Form dated on 3/23 and available in the patient's chart under the Media tab. Ht 5' 7\" (1.702 m)   Wt 194 lb (88 kg)   BMI 30.38 kg/m²     Physical: The patient presents today in no acute distress. He is alert and oriented to person, place and time. He displays appropriate mood and affect. He is well dressed, nourished and  groomed. He has a normal affect. Examination of the neck reveals no evidence of tenderness. Spurling's sign is negative. Active range of motion of the bilateral shoulder is: 175 degrees abduction, 175 degrees forward flexion, 45 degrees of external rotation and internal rotation to L1. Passive range of motion of the bilateral shoulder is: 180 degrees abduction, 180 degrees forward flexion, 50 degrees of external rotation and internal rotation to T11. Examination of the bilateral shoulder reveals positive Neer and Call' impingement signs. There is mild subacromial crepitus with range of motion. Drop arm test does cause pain on the right. Speed's test is negative. There is no evidence of tenderness over the Bicipital groove.  He  does have tenderness

## 2023-04-06 ENCOUNTER — OFFICE VISIT (OUTPATIENT)
Dept: FAMILY MEDICINE CLINIC | Age: 83
End: 2023-04-06
Payer: MEDICARE

## 2023-04-06 VITALS
HEIGHT: 67 IN | SYSTOLIC BLOOD PRESSURE: 132 MMHG | DIASTOLIC BLOOD PRESSURE: 62 MMHG | BODY MASS INDEX: 30.29 KG/M2 | OXYGEN SATURATION: 97 % | WEIGHT: 193 LBS | HEART RATE: 88 BPM | TEMPERATURE: 98.3 F

## 2023-04-06 DIAGNOSIS — E78.2 MIXED HYPERLIPIDEMIA: ICD-10-CM

## 2023-04-06 DIAGNOSIS — Z01.818 PREOP EXAMINATION: Primary | ICD-10-CM

## 2023-04-06 DIAGNOSIS — S43.431D TYPE 1 SUPERIOR LABRAL ANTERIOR-TO-POSTERIOR (SLAP) TEAR OF RIGHT SHOULDER, SUBSEQUENT ENCOUNTER: ICD-10-CM

## 2023-04-06 DIAGNOSIS — M77.8 SHOULDER TENDONITIS, RIGHT: ICD-10-CM

## 2023-04-06 DIAGNOSIS — I77.810 ASCENDING AORTA DILATION (HCC): ICD-10-CM

## 2023-04-06 DIAGNOSIS — M19.011 ARTHRITIS OF RIGHT ACROMIOCLAVICULAR JOINT: ICD-10-CM

## 2023-04-06 DIAGNOSIS — I35.0 AORTIC STENOSIS, MILD: ICD-10-CM

## 2023-04-06 DIAGNOSIS — I10 ESSENTIAL HYPERTENSION, BENIGN: ICD-10-CM

## 2023-04-06 DIAGNOSIS — R91.1 PULMONARY NODULE: ICD-10-CM

## 2023-04-06 DIAGNOSIS — R73.03 PREDIABETES: ICD-10-CM

## 2023-04-06 PROCEDURE — G8417 CALC BMI ABV UP PARAM F/U: HCPCS | Performed by: NURSE PRACTITIONER

## 2023-04-06 PROCEDURE — G8427 DOCREV CUR MEDS BY ELIG CLIN: HCPCS | Performed by: NURSE PRACTITIONER

## 2023-04-06 PROCEDURE — 1124F ACP DISCUSS-NO DSCNMKR DOCD: CPT | Performed by: NURSE PRACTITIONER

## 2023-04-06 PROCEDURE — 99214 OFFICE O/P EST MOD 30 MIN: CPT | Performed by: NURSE PRACTITIONER

## 2023-04-06 PROCEDURE — 3075F SYST BP GE 130 - 139MM HG: CPT | Performed by: NURSE PRACTITIONER

## 2023-04-06 PROCEDURE — 3078F DIAST BP <80 MM HG: CPT | Performed by: NURSE PRACTITIONER

## 2023-04-06 PROCEDURE — 1036F TOBACCO NON-USER: CPT | Performed by: NURSE PRACTITIONER

## 2023-04-06 RX ORDER — METOPROLOL SUCCINATE 25 MG/1
25 TABLET, EXTENDED RELEASE ORAL DAILY
Qty: 36 TABLET | Refills: 0 | Status: SHIPPED | OUTPATIENT
Start: 2023-04-06

## 2023-04-06 ASSESSMENT — ENCOUNTER SYMPTOMS
ROS SKIN COMMENTS: NO HISTORY OF MRSA
SHORTNESS OF BREATH: 0
COUGH: 0
BACK PAIN: 0
CONSTIPATION: 0
WHEEZING: 0
COLOR CHANGE: 0
DIARRHEA: 0
BLOOD IN STOOL: 0
SORE THROAT: 0
NAUSEA: 0
RHINORRHEA: 0
APNEA: 0
TROUBLE SWALLOWING: 0
CHEST TIGHTNESS: 0
VOMITING: 0
ABDOMINAL PAIN: 0

## 2023-04-06 NOTE — PROGRESS NOTES
patients  <25years of age. eGFR results are calculated without  a race factor using the 2021 CKD-EPI equation. Careful  clinical correlation is recommended, particularly when  comparing to results calculated using previous equations. The CKD-EPI equation is less accurate in patients with  extremes of muscle mass, extra-renal metabolism of  creatinine, excessive creatinine ingestion, or following  therapy that affects renal tubular secretion. Calcium 03/01/2023 9.4  8.3 - 10.6 mg/dL Final    Magnesium 03/01/2023 2.10  1.80 - 2.40 mg/dL Final    Cholesterol, Fasting 03/01/2023 134  0 - 199 mg/dL Final    Triglyceride, Fasting 03/01/2023 50  0 - 150 mg/dL Final    HDL 03/01/2023 55  40 - 60 mg/dL Final    LDL Calculated 03/01/2023 69  <100 mg/dL Final    VLDL Cholesterol Calculated 03/01/2023 10  Not Established mg/dL Final          Assessment & Plan   1. Preop examination  Pt cleared for surgery. Stopped all vit/supplements 4/5/2023. Started on metoprolol 25 mg today and instructed to take for 30 days after surgery. 2. Shoulder tendonitis, right  3. Arthritis of right acromioclavicular joint  4. Type 1 superior labral anterior-to-posterior (SLAP) tear of right shoulder, subsequent encounter  -above worsening, pt ready for surgical intervention. 5. Essential hypertension, benign  Well controlled. Continue current dose prinzide. 6. Mixed hyperlipidemia  Stable. LDL 69, HDL 55. Continue vytorin. 7. Pulmonary nodule  Stable. Denies resp complaints. Continue routine follow ups with Pulm. 8. Ascending aorta dilation (HCC)  Stable. Has routine follow ups with cardiology. Denies cardiac complaints today    9. Aortic stenosis, mild  Stable. Denies cardiac complaints today     10. Prediabetes  Stable. A1c 5.7. discussed increasing daily activity, low carb diet, avoid sugary foods.  Repeat in 6 months.   - Hemoglobin A1C; Future     Electronically signed by MEGHAN Ladd NP on 4/6/23 at 8:46 AM

## 2023-04-07 ENCOUNTER — TELEPHONE (OUTPATIENT)
Dept: ORTHOPEDIC SURGERY | Age: 83
End: 2023-04-07

## 2023-04-07 ENCOUNTER — TELEPHONE (OUTPATIENT)
Dept: FAMILY MEDICINE CLINIC | Age: 83
End: 2023-04-07

## 2023-04-07 NOTE — TELEPHONE ENCOUNTER
Pt calling he is having surgery on Wednesday and was taken off all of his pain meds. Tylenol isn't working and was wondering if you could give him something to get him through till his surgery.     Russell Lal

## 2023-04-07 NOTE — TELEPHONE ENCOUNTER
General Question     Subject: R SHOULDER PAIN   Patient and /or Facility Request: Linh Stone"  Contact Number: [phone]    PATIENT CALLED IN TO SEE IF HE CAN GET SOME MEDS FOR HIS R SHOULDER PAIN. Melissa Shook HE CURRENTLY NOT SLEEPING. .. PATIENT HAS SX NEXT Wednesday. Melissa Shook     PLEASE ADVISE

## 2023-04-07 NOTE — TELEPHONE ENCOUNTER
I spoke with patient and told him unfortunately Dr Lauro Stark is not here to ask him plus he is having Sx on Wednesday and Dr Lauro Stark will be here on Tuesday. He is taking Tylenol. He understood.

## 2023-04-07 NOTE — TELEPHONE ENCOUNTER
I would recommend he contact Dr. Gadiel Baez  for pain control since he is the one who stopped his pain medications.

## 2023-04-12 PROBLEM — M75.81 TENDINITIS OF RIGHT ROTATOR CUFF: Status: ACTIVE | Noted: 2023-04-12

## 2023-04-12 PROBLEM — S49.91XA INJURY OF RIGHT SHOULDER: Status: ACTIVE | Noted: 2023-04-12

## 2023-04-12 PROBLEM — S43.431A DEGENERATIVE SUPERIOR LABRAL ANTERIOR-TO-POSTERIOR (SLAP) TEAR OF RIGHT SHOULDER: Status: ACTIVE | Noted: 2023-04-12

## 2023-04-17 ENCOUNTER — TELEPHONE (OUTPATIENT)
Dept: FAMILY MEDICINE CLINIC | Age: 83
End: 2023-04-17

## 2023-04-17 DIAGNOSIS — I10 ESSENTIAL HYPERTENSION, BENIGN: ICD-10-CM

## 2023-04-17 RX ORDER — LISINOPRIL AND HYDROCHLOROTHIAZIDE 20; 12.5 MG/1; MG/1
2 TABLET ORAL DAILY
Qty: 180 TABLET | Refills: 1 | Status: SHIPPED | OUTPATIENT
Start: 2023-04-17

## 2023-04-17 NOTE — TELEPHONE ENCOUNTER
Patient is asking for lisinopril-hydrochlorothiazide 20-12.5, to [facility]. He still has refills on script prescribed by previous pcp, but wants you to send new script in.

## 2023-04-19 ENCOUNTER — TELEPHONE (OUTPATIENT)
Dept: ORTHOPEDIC SURGERY | Age: 83
End: 2023-04-19

## 2023-04-19 ENCOUNTER — OFFICE VISIT (OUTPATIENT)
Dept: ORTHOPEDIC SURGERY | Age: 83
End: 2023-04-19

## 2023-04-19 VITALS — WEIGHT: 190 LBS | BODY MASS INDEX: 29.82 KG/M2 | HEIGHT: 67 IN

## 2023-04-19 DIAGNOSIS — Z98.890 S/P ARTHROSCOPY OF RIGHT SHOULDER: Primary | ICD-10-CM

## 2023-04-19 NOTE — TELEPHONE ENCOUNTER
Notified [facility] office regarding the medical records Terrance ceballos to be mailed to the patient.

## 2023-04-25 ENCOUNTER — HOSPITAL ENCOUNTER (OUTPATIENT)
Dept: PHYSICAL THERAPY | Age: 83
Setting detail: THERAPIES SERIES
Discharge: HOME | End: 2023-04-25
Payer: MEDICARE

## 2023-04-25 DIAGNOSIS — M25.611 SHOULDER STIFFNESS, RIGHT: ICD-10-CM

## 2023-04-25 DIAGNOSIS — M25.511 ACUTE PAIN OF RIGHT SHOULDER: Primary | ICD-10-CM

## 2023-04-25 DIAGNOSIS — R29.898 RIGHT ARM WEAKNESS: ICD-10-CM

## 2023-04-25 PROCEDURE — 97110 THERAPEUTIC EXERCISES: CPT | Performed by: PHYSICAL THERAPIST

## 2023-04-25 PROCEDURE — 97530 THERAPEUTIC ACTIVITIES: CPT | Performed by: PHYSICAL THERAPIST

## 2023-04-25 PROCEDURE — 97161 PT EVAL LOW COMPLEX 20 MIN: CPT | Performed by: PHYSICAL THERAPIST

## 2023-04-25 NOTE — FLOWSHEET NOTE
assess goals progression <30days   [] Goals require adjustment due to lack of progress  [] Patient is not progressing as expected and requires additional follow up with physician  [] Other    Prognosis for POC: [x] Good [] Fair  [] Poor      Patient requires continued skilled intervention: [x] Yes  [] No      ASSESSMENT:  See eval    Treatment/Activity Tolerance:  [x] Patient tolerated treatment well [] Patient limited by fatique  [] Patient limited by pain  [] Patient limited by other medical complications  [] Other:       Return to Play: (if applicable)   []  Stage 1: Intro to Strength   []  Stage 2: Return to Run and Strength   []  Stage 3: Return to Jump and Strength   []  Stage 4: Dynamic Strength and Agility   []  Stage 5: Sport Specific Training     []  Ready to Return to Play, Meets All Above Stages   []  Not Ready for Return to Sports   Comments:            Prognosis: [x] Good [] Fair  [] Poor    Patient Requires Follow-up: [x] Yes  [] No    PLAN: See eval; advance AAROM/PROM as tolerated; consider pulleys, ER supine at side and ~60 abd with cane, Bb IR with rope/cane horizontal vs vertical pending motion, SL IR  [] Continue per plan of care [] Alter current plan (see comments above)  [x] Plan of care initiated [] Hold pending MD visit [] Discharge    Note: If patient does not return for scheduled/ recommended follow up visits, this note will serve as a discharge from care along with most recent update on progress.      Electronically signed by: Trinidad Tinoco, PT, DPT

## 2023-04-25 NOTE — PLAN OF CARE
[facility]  Phone: [phone]   Fax: [phone]          Physical Therapy Certification    Dear Monserrat Amador MD,    We had the pleasure of evaluating the following patient for physical therapy services at [address] [place] [address]. A summary of our findings can be found in the initial assessment below. This includes our plan of care. If you have any questions or concerns regarding these findings, please do not hesitate to contact me at the office phone number checked above. Thank you for the referral.       Physician Signature:_______________________________Date:__________________  By signing above (or electronic signature), therapists plan is approved by physician      Patient: Mirella Wu   : 1940   MRN: [id number]  Referring Provider:  Monserrat Amador MD       Evaluation Date: 2023      Medical Diagnosis:  S/P arthroscopy of right shoulder [Z98.890]  Procedure(s): #1 right shoulder arthroscopy with labral debridement #2 right shoulder arthroscopy with chondroplasty of the glenoid #3 right shoulder open Blessing #4 right shoulder subacromial decompression  Treatment Diagnosis:    ICD-10-CM    1. Acute pain of right shoulder  M25.511       2. Right arm weakness  R29.898       3. Shoulder stiffness, right  M25.611                                                            Precautions/ Contra-indications: per referral : \"Aggressive Active and passive range of motion as tolerated.  Can progress to strengthening in 2 weeks \"    C-SSRS Triggered by Intake questionnaire (Past 2 wk assessment):   [x] No, Questionnaire did not trigger screening.   [] Yes, Patient intake triggered further evaluation      [] C-SSRS Screening completed  [] PCP notified via Plan of Care  [] Emergency services notified     Latex Allergy:  [x]NO      []YES  Preferred Language for Healthcare:   [x]English

## 2023-04-26 ENCOUNTER — TELEPHONE (OUTPATIENT)
Dept: FAMILY MEDICINE CLINIC | Age: 83
End: 2023-04-26

## 2023-04-26 NOTE — TELEPHONE ENCOUNTER
Pt calling he has tested positive for covid  today is asking what he can take otc. Pt is unable to do a VV.     Krisahn Mandujano

## 2023-04-27 ENCOUNTER — APPOINTMENT (OUTPATIENT)
Dept: PHYSICAL THERAPY | Age: 83
End: 2023-04-27
Payer: MEDICARE

## 2023-05-02 ENCOUNTER — APPOINTMENT (OUTPATIENT)
Dept: PHYSICAL THERAPY | Age: 83
End: 2023-05-02
Payer: MEDICARE

## 2023-05-05 ENCOUNTER — APPOINTMENT (OUTPATIENT)
Dept: PHYSICAL THERAPY | Age: 83
End: 2023-05-05
Payer: MEDICARE

## 2023-05-05 ENCOUNTER — TELEPHONE (OUTPATIENT)
Dept: ORTHOPEDIC SURGERY | Age: 83
End: 2023-05-05

## 2023-05-05 NOTE — TELEPHONE ENCOUNTER
General Question     Subject: REFERRALS   Patient and /or Facility Request: Aleksandar Mina"  Contact Number: [phone]    PATIENT CALLED IN TO SEE IF HE CAN GET AN REFERRAL FOR THE NECK AND HAND DOCTORS. ..      PLEASE ADVISE

## 2023-05-09 ENCOUNTER — APPOINTMENT (OUTPATIENT)
Dept: PHYSICAL THERAPY | Age: 83
End: 2023-05-09
Payer: MEDICARE

## 2023-05-10 ENCOUNTER — OFFICE VISIT (OUTPATIENT)
Dept: ORTHOPEDIC SURGERY | Age: 83
End: 2023-05-10

## 2023-05-10 ENCOUNTER — TELEPHONE (OUTPATIENT)
Dept: ORTHOPEDIC SURGERY | Age: 83
End: 2023-05-10

## 2023-05-10 VITALS — BODY MASS INDEX: 29.82 KG/M2 | HEIGHT: 67 IN | WEIGHT: 190 LBS

## 2023-05-10 DIAGNOSIS — M50.30 DDD (DEGENERATIVE DISC DISEASE), CERVICAL: ICD-10-CM

## 2023-05-10 DIAGNOSIS — Z98.890 S/P ARTHROSCOPY OF RIGHT SHOULDER: Primary | ICD-10-CM

## 2023-05-10 DIAGNOSIS — M48.02 CERVICAL STENOSIS OF SPINE: ICD-10-CM

## 2023-05-10 DIAGNOSIS — M54.2 NECK PAIN: ICD-10-CM

## 2023-05-10 DIAGNOSIS — M47.812 CERVICAL SPONDYLOSIS: ICD-10-CM

## 2023-05-10 RX ORDER — METHYLPREDNISOLONE 4 MG/1
TABLET ORAL
Qty: 1 KIT | Refills: 0 | Status: SHIPPED | OUTPATIENT
Start: 2023-05-10 | End: 2023-05-16

## 2023-05-10 NOTE — TELEPHONE ENCOUNTER
Gabo Hernandez MA  [facility]    MRI CERVICAL SPINE no precert required. Received insurance approval for MRI to be scheduled at [facility]. Patient may call [phone] to schedule. Once scheduled, patient should call our office back to make a follow-up appointment to go over the results. I left a detailed VM for pt letting him know.

## 2023-05-10 NOTE — PROGRESS NOTES
Warren Serrano  [id number]  May 10, 2023    Chief Complaint   Patient presents with    Follow-up     Rt shoulder        History: The patient is an 82-year-old gentleman who is 1 month status post right shoulder arthroscopy. He reports minimal to no pain in the right shoulder. He is having radiating pain down his arms bilaterally. It is most severe in the right arm. He has numbness extending into the hand as well bilaterally. The patient's  past medical history, medications, allergies,  family history, social history, and review of systems have been reviewed, and dated and are recorded in the chart. Ht 5' 7\" (1.702 m)   Wt 190 lb (86.2 kg)   BMI 29.76 kg/m²     Physical:  Mr. Warren Serrano appears well, he is in no apparent distress, he demonstrates appropriate mood & affect. He is alert and oriented to person, place and time. He has mild swelling. He is neurovascularly intact distally. Sensation is intact in the axillary nerve distribution. right shoulder range of motion: 165 degrees abduction, 160 degrees forward flexion, 40 degrees external rotation and internal rotation to L3. He has minimal pain with light range of motion of the shoulder. The incisions are clean, dry, and intact and without erythema. Strength in the shoulder is: 5/5  Abduction and 5/5 external rotation. The patient does have triceps weakness bilaterally. He has mild weakness with right elbow flexion as well. X-Rays: 2 views of the cervical spine obtained in the office today were extensively reviewed. The patient has evidence of severe cervical degenerative disc disease. There is significant facet arthropathy. There is reversal of the cervical lordosis. The changes are most severe at the C5-C6 level of the C6-C7 level    Impression: status post right shoulder arthroscopy, subacromial decompression and ad #2 severe cervical degenerative disc disease #3 cervical spondylosis with stenosis    Plan:  At this time,

## 2023-05-12 ENCOUNTER — HOSPITAL ENCOUNTER (OUTPATIENT)
Dept: PHYSICAL THERAPY | Age: 83
Setting detail: THERAPIES SERIES
Discharge: HOME | End: 2023-05-12
Payer: MEDICARE

## 2023-05-12 PROCEDURE — 97140 MANUAL THERAPY 1/> REGIONS: CPT | Performed by: PHYSICAL THERAPIST

## 2023-05-12 PROCEDURE — 97110 THERAPEUTIC EXERCISES: CPT | Performed by: PHYSICAL THERAPIST

## 2023-05-12 PROCEDURE — 97164 PT RE-EVAL EST PLAN CARE: CPT | Performed by: PHYSICAL THERAPIST

## 2023-05-12 NOTE — PLAN OF CARE
Trap         Lower Trap         Mid Trap         Rhomboids         Biceps  5  4+     Triceps  5 4+      Wrist flex  4+ pain in wrist  4+     Wrist extn  4+ pain in wrist  4+       4 weak pain  3 weakness           Reflexes/Sensation (myotomes/dermatomes):  able to sense light touch in hand and arm     Joint mobility: n/t              []Normal                       []Hypo              []Hyper     Palpation: n/t     Functional Mobility/Transfers: Indep     Posture: significant forward head and shoulders     Bandages/Dressings/Incisions:       Gait: (include devices/WB status) Indep       RESTRICTIONS/PRECAUTIONS: per referral 4/19: \"Aggressive Active and passive range of motion as tolerated.  Can progress to strengthening in 2 weeks \"    Exercises/Interventions:     Exercise/Equipment Resistance/Repetitions Other comments   Aerobic Conditioning     Aerodyne          Stretching/PROM     ER supine  10 x 10 secs at side and 80 abd    Cane ER-      Wand flex     Towel press up     Passive shoulder flex with opposite UE assist     Ball on wall for shoulder flex     Elbow PROM     Side-lying flex     Table Slides     Wall slides  10 x 10 secs flex    UE Lester     Pulleys 10 x 10 secs     Wrist flex/extn AROM HEP    Pendulum hold/forward bow     BB IR 10 x 10 secs vertical    SL IR 10x 10 secs    Pec doorway stretch     CBA stretch     UT stretch     LS stretch     Isometrics     Retraction 10 x 5 secs Cues for posture       Weight shift     Flexion     Abduction     External Rotation     Internal Rotation     Biceps     Triceps     Chin tuck 10 x 5 secs    PRE's     Flexion     Abduction     External Rotation     Internal Rotation     Shrugs 3 x 10     Prone EXT     Reverse Flys- prone Ts     Prone Ys     Serratus 3 x 10 Reports numbness into R hand at end   Horizontal Abd with ER     Biceps 3 x 10     Triceps     Retraction          Cable Column/Theraband     External rotation to upper cut isometric     External

## 2023-05-15 RX ORDER — HYDROCORTISONE BUTYRATE 1 MG/G
OINTMENT TOPICAL
Qty: 45 G | Refills: 0 | Status: SHIPPED | OUTPATIENT
Start: 2023-05-15

## 2023-05-15 RX ORDER — EZETIMIBE AND SIMVASTATIN 10; 40 MG/1; MG/1
TABLET ORAL
Qty: 90 TABLET | Refills: 2 | Status: SHIPPED | OUTPATIENT
Start: 2023-05-15

## 2023-05-15 RX ORDER — HYDROCORTISONE 25 MG/G
CREAM TOPICAL
Qty: 28 G | Refills: 5 | Status: SHIPPED | OUTPATIENT
Start: 2023-05-15

## 2023-05-16 ENCOUNTER — HOSPITAL ENCOUNTER (OUTPATIENT)
Dept: PHYSICAL THERAPY | Age: 83
Setting detail: THERAPIES SERIES
Discharge: HOME | End: 2023-05-16
Payer: MEDICARE

## 2023-05-16 PROCEDURE — 97140 MANUAL THERAPY 1/> REGIONS: CPT

## 2023-05-16 PROCEDURE — 97112 NEUROMUSCULAR REEDUCATION: CPT

## 2023-05-16 PROCEDURE — 97110 THERAPEUTIC EXERCISES: CPT

## 2023-05-16 NOTE — FLOWSHEET NOTE
Comment   Flexion         Abduction         ER         IR         Supraspinatus         Upper Trap         Lower Trap         Mid Trap         Rhomboids         Biceps  5  4+     Triceps  5 4+      Wrist flex  4+ pain in wrist  4+     Wrist extn  4+ pain in wrist  4+       4 weak pain  3 weakness           Reflexes/Sensation (myotomes/dermatomes):  able to sense light touch in hand and arm     Joint mobility: n/t              []Normal                       []Hypo              []Hyper     Palpation: n/t     Functional Mobility/Transfers: Indep     Posture: significant forward head and shoulders     Bandages/Dressings/Incisions:       Gait: (include devices/WB status) Indep       RESTRICTIONS/PRECAUTIONS: per referral 4/19: \"Aggressive Active and passive range of motion as tolerated.  Can progress to strengthening in 2 weeks \"    Exercises/Interventions:     Exercise/Equipment Resistance/Repetitions Other comments   Aerobic Conditioning     Aerodyne          Stretching/PROM     ER supine  10 x 10 secs at side and 80 abd    Cane ER-      Wand flex     Towel press up     Passive shoulder flex with opposite UE assist     Ball on wall for shoulder flex     Elbow PROM     Side-lying flex     Table Slides     Wall slides  10 x 10 secs flex    UE [place]     Pulleys 10 x 10 secs     Wrist flex/extn AROM HEP    Pendulum hold/forward bow     BB IR 10 x 10 secs vertical    SL IR 10x 10 secs    Pec doorway stretch     CBA stretch     UT stretch     LS stretch     Isometrics     Retraction Cues for posture       Weight shift     Flexion 10 x 10 secs    Abduction 10 x 10 secs    External Rotation 10 x 10 secs    Internal Rotation 10 x 10 secs    Biceps     Triceps     Chin tuck 10 x 5 secs Seated and supine   PRE's     Flexion     Abduction     External Rotation     Internal Rotation     Shrugs 3 x 10     Prone EXT     Reverse Flys- prone Ts     Prone Ys     Serratus 3 x 10    Horizontal Abd with ER     Biceps 3 x 10

## 2023-05-19 ENCOUNTER — HOSPITAL ENCOUNTER (OUTPATIENT)
Dept: MRI IMAGING | Age: 83
Discharge: HOME | End: 2023-05-19
Payer: MEDICARE

## 2023-05-19 ENCOUNTER — HOSPITAL ENCOUNTER (OUTPATIENT)
Dept: PHYSICAL THERAPY | Age: 83
Setting detail: THERAPIES SERIES
Discharge: HOME | End: 2023-05-19
Payer: MEDICARE

## 2023-05-19 ENCOUNTER — APPOINTMENT (OUTPATIENT)
Dept: PHYSICAL THERAPY | Age: 83
End: 2023-05-19
Payer: MEDICARE

## 2023-05-19 DIAGNOSIS — M48.02 CERVICAL STENOSIS OF SPINE: ICD-10-CM

## 2023-05-19 DIAGNOSIS — M50.30 DDD (DEGENERATIVE DISC DISEASE), CERVICAL: ICD-10-CM

## 2023-05-19 DIAGNOSIS — M54.2 NECK PAIN: ICD-10-CM

## 2023-05-19 DIAGNOSIS — M47.812 CERVICAL SPONDYLOSIS: ICD-10-CM

## 2023-05-19 PROCEDURE — 97110 THERAPEUTIC EXERCISES: CPT

## 2023-05-19 PROCEDURE — 97112 NEUROMUSCULAR REEDUCATION: CPT

## 2023-05-19 PROCEDURE — 97012 MECHANICAL TRACTION THERAPY: CPT

## 2023-05-19 PROCEDURE — 72141 MRI NECK SPINE W/O DYE: CPT

## 2023-05-19 NOTE — FLOWSHEET NOTE
Rotation     Shrugs 3 x 10     Prone EXT     Reverse Flys- prone Ts     Prone Ys     Serratus 3 x 10    Horizontal Abd with ER     Biceps 1#  3 x 10     Triceps     Retraction          Cable Column/Theraband     External rotation to upper cut isometric     External Rotation     Internal Rotation     Shrugs     Lats     Ext     Flex     Scapular Retraction Green 3 x 10    BIC     TRIC     PNF     UK flexion/deltoid retraining     Dynamic Stability     Ball on wall     Push ups on wall     Push ups on ball on wall     90/90 ball taps     Horizontal abd ball taps     120 overhead ball taps (Y position)     Plyoback          Manual treatment:             Modalities:     Mechanical cervical traction Static 15-17# x10 min Flexion level 3     Patient education: Pt was educated on PT diagnosis, prognosis, and plan of care. Pt was educated on the use of ice throughout the day. Pt was educated on importance of correcting his/her posture throughout the day. Therapeutic Exercise and NMR EXR  [x] (42666) Provided verbal/tactile cueing for activities related to strengthening, flexibility, endurance, ROM  for improvements in scapular, scapulothoracic and UE control with self care, reaching, carrying, lifting, house/yardwork, driving/computer work.    [] (05211) Provided verbal/tactile cueing for activities related to improving balance, coordination, kinesthetic sense, posture, motor skill, proprioception  to assist with  scapular, scapulothoracic and UE control with self care, reaching, carrying, lifting, house/yardwork, driving/computer work. Therapeutic Activities:    [x] (60787 or 20639) Provided verbal/tactile cueing for activities related to improving balance, coordination, kinesthetic sense, posture, motor skill, proprioception and motor activation to allow for proper function of scapular, scapulothoracic and UE control with self care, carrying, lifting, driving/computer work.      Home Exercise Program:    [x]

## 2023-05-22 ENCOUNTER — HOSPITAL ENCOUNTER (OUTPATIENT)
Dept: PHYSICAL THERAPY | Age: 83
Setting detail: THERAPIES SERIES
Discharge: HOME | End: 2023-05-22
Payer: MEDICARE

## 2023-05-22 PROCEDURE — 97112 NEUROMUSCULAR REEDUCATION: CPT

## 2023-05-22 PROCEDURE — 97110 THERAPEUTIC EXERCISES: CPT

## 2023-05-22 PROCEDURE — 97012 MECHANICAL TRACTION THERAPY: CPT

## 2023-05-22 NOTE — FLOWSHEET NOTE
Triceps     Chin tuck 10 x 5 secs Seated and supine   PRE's     Flexion     Abduction     External Rotation     Internal Rotation     Shrugs 3 x 10     Prone EXT     Reverse Flys- prone Ts     Prone Ys     Serratus 3 x 10    Horizontal Abd with ER     Biceps 1#  3 x 10     Triceps     Retraction          Cable Column/Theraband     External rotation to upper cut isometric     External Rotation     Internal Rotation     Shrugs     Lats     Ext     Flex     Scapular Retraction Green 3 x 10    BIC     TRIC     PNF     UK flexion/deltoid retraining     Dynamic Stability     Ball on wall     Push ups on wall     Push ups on ball on wall     90/90 ball taps     Horizontal abd ball taps     120 overhead ball taps (Y position)     Plyoback          Manual treatment:             Modalities:     Mechanical cervical traction Static 20# x10 min Flexion level 3     Patient education: Pt was educated on PT diagnosis, prognosis, and plan of care. Pt was educated on the use of ice throughout the day. Pt was educated on importance of correcting his/her posture throughout the day. Therapeutic Exercise and NMR EXR  [x] (68081) Provided verbal/tactile cueing for activities related to strengthening, flexibility, endurance, ROM  for improvements in scapular, scapulothoracic and UE control with self care, reaching, carrying, lifting, house/yardwork, driving/computer work.    [] (22045) Provided verbal/tactile cueing for activities related to improving balance, coordination, kinesthetic sense, posture, motor skill, proprioception  to assist with  scapular, scapulothoracic and UE control with self care, reaching, carrying, lifting, house/yardwork, driving/computer work.     Therapeutic Activities:    [x] (92717 or 46233) Provided verbal/tactile cueing for activities related to improving balance, coordination, kinesthetic sense, posture, motor skill, proprioception and motor activation to allow for proper function of scapular,

## 2023-05-23 ENCOUNTER — OFFICE VISIT (OUTPATIENT)
Dept: ORTHOPEDIC SURGERY | Age: 83
End: 2023-05-23
Payer: MEDICARE

## 2023-05-23 VITALS — BODY MASS INDEX: 29.82 KG/M2 | WEIGHT: 190 LBS | HEIGHT: 67 IN

## 2023-05-23 DIAGNOSIS — M47.812 CERVICAL SPONDYLOSIS: Primary | ICD-10-CM

## 2023-05-23 PROCEDURE — 1124F ACP DISCUSS-NO DSCNMKR DOCD: CPT | Performed by: ORTHOPAEDIC SURGERY

## 2023-05-23 PROCEDURE — 99213 OFFICE O/P EST LOW 20 MIN: CPT | Performed by: ORTHOPAEDIC SURGERY

## 2023-05-23 RX ORDER — CELECOXIB 200 MG/1
CAPSULE ORAL
COMMUNITY
Start: 2023-04-15 | End: 2023-05-25 | Stop reason: SDUPTHER

## 2023-05-23 NOTE — PROGRESS NOTES
does not show any tenderness, deformity or injury. Range of motion is unremarkable. There is no gross instability. There are no rashes, ulcerations or lesions. Strength and tone are normal.  LEFT UPPER EXTREMITY: Inspection/examination of the left upper extremity does not show any tenderness, deformity or injury. Range of motion is unremarkable. There is no gross instability. There are no rashes, ulcerations or lesions. Strength and tone are normal.    Diagnostic Testing:  I reviewed MRI images of cervical spine from 5/19/2023 in the office today. Those show spondylosis with foraminal stenosis worse left C5-C6 and C6-C7 but minimal central stenosis. I reviewed AP and lateral x-rays of her cervical spine from 5/10/2023 in the office today. Those show cervical spondylosis worse C5-C6 and C6-C7    Impression:   Cervical spondylosis with foraminal stenosis  Carpal tunnel syndrome    Plan:    Discussed treatment options including observation, physical therapy, medications, epidural injections and emg. He would like to proceed with EMG of his upper extremities.   We discussed cervical epidural injections

## 2023-05-24 NOTE — TELEPHONE ENCOUNTER
----- Message from Manuelakbar Luis Miguel sent at 5/24/2023 11:47 AM EDT -----  Subject: Refill Request    QUESTIONS  Name of Medication? celecoxib (CELEBREX) 200 MG capsule  Patient-reported dosage and instructions? 200 mg, twice a day  How many days do you have left? 5  Preferred Pharmacy? [facility] [id number]  Pharmacy phone number (if available)? [phone]  Additional Information for Provider? Patient is requesting that Talisha Hussein   take over this Rx from Dr. Chelle Mckeon, his orthopedist (shoulder surgery). Please call patient to discuss and/or confirm. ([phone], Press 8 to get   through on phone)  ---------------------------------------------------------------------------  --------------  [address]  What is the best way for the office to contact you? OK to leave message on   voicemail  Preferred Call Back Phone Number? [phone]  ---------------------------------------------------------------------------  --------------  SCRIPT ANSWERS  Relationship to Patient?  Self

## 2023-05-25 ENCOUNTER — TELEPHONE (OUTPATIENT)
Dept: ORTHOPEDIC SURGERY | Age: 83
End: 2023-05-25

## 2023-05-25 RX ORDER — CELECOXIB 200 MG/1
200 CAPSULE ORAL 2 TIMES DAILY
Qty: 60 CAPSULE | Refills: 2 | Status: SHIPPED | OUTPATIENT
Start: 2023-05-25

## 2023-05-25 NOTE — TELEPHONE ENCOUNTER
General Question     Subject: patient call and he stated he need his EMG Order sent to   so he can go over there and get that scheduled.  Please Advise   Patient Stef Higgins"  Contact Number: [phone]

## 2023-05-26 ENCOUNTER — HOSPITAL ENCOUNTER (OUTPATIENT)
Dept: PHYSICAL THERAPY | Age: 83
Setting detail: THERAPIES SERIES
Discharge: HOME | End: 2023-05-26
Payer: MEDICARE

## 2023-05-26 PROCEDURE — 97112 NEUROMUSCULAR REEDUCATION: CPT | Performed by: PHYSICAL THERAPIST

## 2023-05-26 PROCEDURE — 97110 THERAPEUTIC EXERCISES: CPT | Performed by: PHYSICAL THERAPIST

## 2023-05-26 PROCEDURE — 97012 MECHANICAL TRACTION THERAPY: CPT | Performed by: PHYSICAL THERAPIST

## 2023-05-26 NOTE — FLOWSHEET NOTE
[place] [facility] [place] [address]. [facility]  Phone: [phone]   Fax: [phone]                                                  Physical Therapy Daily Treatment Note  Date:  2023     Patient Name:  Yue Joshi    :  1940  MRN: [id number]    Medical Diagnosis:  S/P arthroscopy of right shoulder [Z98.890] on 23  Procedure(s): #1 right shoulder arthroscopy with labral debridement #2 right shoulder arthroscopy with chondroplasty of the glenoid #3 right shoulder open Blessing #4 right shoulder subacromial decompression     M54.2 (ICD-10-CM) - Neck pain   Z98.890 (ICD-10-CM) - S/P arthroscopy of right shoulder   M50.30 (ICD-10-CM) - DDD (degenerative disc disease), cervical   M47.812 (ICD-10-CM) - Cervical spondylosis   M48.02 (ICD-10-CM) - Cervical stenosis of spine     Treatment Diagnosis:    ICD-10-CM    1. Acute pain of right shoulder  M25.511       2. Right arm weakness  R29.898       3. Shoulder stiffness, right  M25.611    4.  M54.2 (ICD-10-CM) - Neck pain     Insurance/Certification information:  PT Insurance Information: [facility] Medicare- [place] auth, [place]  Referring Provider:  Ashleigh Conway MD  Has the plan of care been signed (Y/N):        []  Yes  [x]  No     Date of Patient follow up with Physician:       Is this a Progress Report:     [x]  Yes  []  No        If Yes:  Date Range for reporting period:  Beginning- 2023   Ending-23    Progress report will be due (10 Rx or 30 days whichever is less):        Recertification will be due (POC Duration  / 90 days whichever is less): as above        Visit # Insurance Allowable Auth Required   6 [place] []  Yes [x]  No        Functional Scale: UEFI 86.25%    Date assessed:  2023      Latex Allergy:  [x]NO      []YES  Preferred Language for Healthcare:   [x]English       []other:      Pain level:  1-4/10  on

## 2023-05-30 ENCOUNTER — HOSPITAL ENCOUNTER (OUTPATIENT)
Dept: PHYSICAL THERAPY | Age: 83
Setting detail: THERAPIES SERIES
Discharge: HOME | End: 2023-05-30
Payer: MEDICARE

## 2023-05-30 ENCOUNTER — HOSPITAL ENCOUNTER (OUTPATIENT)
Dept: CT IMAGING | Age: 83
Discharge: HOME | End: 2023-05-30
Payer: MEDICARE

## 2023-05-30 DIAGNOSIS — J92.0 PLEURAL PLAQUE DUE TO ASBESTOS EXPOSURE: ICD-10-CM

## 2023-05-30 DIAGNOSIS — R91.8 PULMONARY NODULES: ICD-10-CM

## 2023-05-30 PROCEDURE — 97112 NEUROMUSCULAR REEDUCATION: CPT | Performed by: PHYSICAL THERAPIST

## 2023-05-30 PROCEDURE — 71250 CT THORAX DX C-: CPT

## 2023-05-30 PROCEDURE — 97110 THERAPEUTIC EXERCISES: CPT | Performed by: PHYSICAL THERAPIST

## 2023-05-30 NOTE — FLOWSHEET NOTE
[place] [address]. [facility]  Phone: [phone]   Fax: [phone]                                                  Physical Therapy Daily Treatment Note  Date:  2023     Patient Name:  Jhnoatan Campuzano    :  1940  MRN: [id number]    Medical Diagnosis:  S/P arthroscopy of right shoulder [Z98.890] on 23  Procedure(s): #1 right shoulder arthroscopy with labral debridement #2 right shoulder arthroscopy with chondroplasty of the glenoid #3 right shoulder open Blessing #4 right shoulder subacromial decompression     M54.2 (ICD-10-CM) - Neck pain   Z98.890 (ICD-10-CM) - S/P arthroscopy of right shoulder   M50.30 (ICD-10-CM) - DDD (degenerative disc disease), cervical   M47.812 (ICD-10-CM) - Cervical spondylosis   M48.02 (ICD-10-CM) - Cervical stenosis of spine     Treatment Diagnosis:    ICD-10-CM    1. Acute pain of right shoulder  M25.511       2. Right arm weakness  R29.898       3. Shoulder stiffness, right  M25.611    4.  M54.2 (ICD-10-CM) - Neck pain     Insurance/Certification information:  PT Insurance Information: [facility] Medicare- [place] auth, [place]  Referring Provider:  Adamaris Randolph MD  Has the plan of care been signed (Y/N):        [x]  Yes  []  No     Date of Patient follow up with Physician:       Is this a Progress Report:     [x]  Yes  []  No        If Yes:  Date Range for reporting period:  Beginning- 2023   Ending-23    Progress report will be due (10 Rx or 30 days whichever is less):        Recertification will be due (POC Duration  / 90 days whichever is less): as above        Visit # Insurance Allowable Auth Required   7 [place] []  Yes [x]  No        Functional Scale: UEFI 86.25%    Date assessed:  2023      Latex Allergy:  [x]NO      []YES  Preferred Language for Healthcare:   [x]English       []other:      Pain level:  1-4/10  on

## 2023-05-31 ENCOUNTER — TELEPHONE (OUTPATIENT)
Dept: CARDIOLOGY CLINIC | Age: 83
End: 2023-05-31

## 2023-05-31 NOTE — TELEPHONE ENCOUNTER
CARDIAC CLEARANCE     What type of procedure are you having? Bilateral Carpel tunnel release    Which physician is performing your procedure? 6/7/23    When is your procedure scheduled for? Dr. Ras Jon    Where are you having this procedure? [facility]    Are you taking Blood Thinners? If so what?  (Name/dose/frequesncy)  N/A    Phone Number and Contact Name for Physicians office:  Rah Delgadillo: [phone]    Fax number to send information:  [phone]

## 2023-05-31 NOTE — TELEPHONE ENCOUNTER
Per Dr. Danielle Shah, patient is cleared for procedure. Please notify and prepare letter if necessary. Thank you.

## 2023-06-01 ENCOUNTER — OFFICE VISIT (OUTPATIENT)
Dept: FAMILY MEDICINE CLINIC | Age: 83
End: 2023-06-01
Payer: MEDICARE

## 2023-06-01 VITALS
SYSTOLIC BLOOD PRESSURE: 136 MMHG | HEART RATE: 85 BPM | TEMPERATURE: 98.2 F | OXYGEN SATURATION: 97 % | BODY MASS INDEX: 30.23 KG/M2 | DIASTOLIC BLOOD PRESSURE: 68 MMHG | WEIGHT: 192.6 LBS | HEIGHT: 67 IN

## 2023-06-01 DIAGNOSIS — I35.0 AORTIC STENOSIS, MILD: ICD-10-CM

## 2023-06-01 DIAGNOSIS — I10 ESSENTIAL HYPERTENSION, BENIGN: ICD-10-CM

## 2023-06-01 DIAGNOSIS — R74.8 ELEVATED LIVER ENZYMES: ICD-10-CM

## 2023-06-01 DIAGNOSIS — Z01.818 PREOP EXAMINATION: ICD-10-CM

## 2023-06-01 DIAGNOSIS — I77.810 ASCENDING AORTA DILATION (HCC): ICD-10-CM

## 2023-06-01 DIAGNOSIS — Z01.818 PREOP EXAMINATION: Primary | ICD-10-CM

## 2023-06-01 DIAGNOSIS — G56.03 BILATERAL CARPAL TUNNEL SYNDROME: ICD-10-CM

## 2023-06-01 LAB — POTASSIUM SERPL-SCNC: 4.5 MMOL/L (ref 3.5–5.1)

## 2023-06-01 PROCEDURE — 1124F ACP DISCUSS-NO DSCNMKR DOCD: CPT | Performed by: NURSE PRACTITIONER

## 2023-06-01 PROCEDURE — 3075F SYST BP GE 130 - 139MM HG: CPT | Performed by: NURSE PRACTITIONER

## 2023-06-01 PROCEDURE — 3078F DIAST BP <80 MM HG: CPT | Performed by: NURSE PRACTITIONER

## 2023-06-01 PROCEDURE — 99214 OFFICE O/P EST MOD 30 MIN: CPT | Performed by: NURSE PRACTITIONER

## 2023-06-01 ASSESSMENT — ENCOUNTER SYMPTOMS
CHEST TIGHTNESS: 0
NAUSEA: 0
CONSTIPATION: 0
ABDOMINAL PAIN: 0
COUGH: 0
RHINORRHEA: 0
SHORTNESS OF BREATH: 0
APNEA: 0
SORE THROAT: 0
WHEEZING: 0
VOMITING: 0
DIARRHEA: 0
ROS SKIN COMMENTS: NO HISTORY OF MRSA
TROUBLE SWALLOWING: 0
BACK PAIN: 0
COLOR CHANGE: 0

## 2023-06-01 NOTE — PROGRESS NOTES
7 - 20 mg/dL Final    Creatinine 03/01/2023 1.1  0.8 - 1.3 mg/dL Final    Est, Glom Filt Rate 03/01/2023 >60  >60 Final    Comment: Pediatric calculator link  [employer]. org/professionals/kdoqi/gfr_calculatorped  Effective Oct 3, 2022  These results are not intended for use in patients  <25years of age. eGFR results are calculated without  a race factor using the 2021 CKD-EPI equation. Careful  clinical correlation is recommended, particularly when  comparing to results calculated using previous equations. The CKD-EPI equation is less accurate in patients with  extremes of muscle mass, extra-renal metabolism of  creatinine, excessive creatinine ingestion, or following  therapy that affects renal tubular secretion. Calcium 03/01/2023 9.4  8.3 - 10.6 mg/dL Final    Magnesium 03/01/2023 2.10  1.80 - 2.40 mg/dL Final    Cholesterol, Fasting 03/01/2023 134  0 - 199 mg/dL Final    Triglyceride, Fasting 03/01/2023 50  0 - 150 mg/dL Final    HDL 03/01/2023 55  40 - 60 mg/dL Final    LDL Calculated 03/01/2023 69  <100 mg/dL Final    VLDL Cholesterol Calculated 03/01/2023 10  Not Established mg/dL Final          Assessment & Plan   1. Preop examination  Pt cleared for surgery    2. Bilateral carpal tunnel syndrome  Worsening, pt ready to have surgical intervention    3. Essential hypertension, benign  Well controlled. Continue prinzide and Toprol as prescribed. 4. Elevated liver enzymes  Stable. 9/2022 ALT 11, AST 11    5. Aortic stenosis, mild  Stable. ECHO 6/2020 EF 60%. Received cardiac clearance. Continue prinzide and Toprol as prescribed. 6. Ascending aorta dilation (HCC)  Stable. Chest CT 4/2022 4.2 cm aortic root dilatation. Received cardiac clearance. Continue prinzide and Toprol as prescribed.           Electronically signed by MEGHAN Parks NP on 6/1/23 at 9:10 AM EDT

## 2023-06-02 ENCOUNTER — HOSPITAL ENCOUNTER (OUTPATIENT)
Dept: PHYSICAL THERAPY | Age: 83
Setting detail: THERAPIES SERIES
Discharge: HOME | End: 2023-06-02
Payer: MEDICARE

## 2023-06-02 PROCEDURE — 97110 THERAPEUTIC EXERCISES: CPT | Performed by: PHYSICAL THERAPIST

## 2023-06-02 PROCEDURE — 97112 NEUROMUSCULAR REEDUCATION: CPT | Performed by: PHYSICAL THERAPIST

## 2023-06-02 NOTE — FLOWSHEET NOTE
Dynamic Strength and Agility   []  Stage 5: Sport Specific Training     []  Ready to Return to Play, Meets All Above Stages   []  Not Ready for Return to Sports   Comments:            Prognosis: [x] Good [] Fair  [] Poor    Patient Requires Follow-up: [x] Yes  [] No    PLAN: 2x/week for 6 weeks (5/12/23);  progress shoulder and scapular strengthening;  consider ankle weights around wrists for strengthening to avoid gripping activities for hands. [x] Continue per plan of care [] Alter current plan (see comments above)  [] Plan of care initiated [] Hold pending MD visit [] Discharge    Note: If patient does not return for scheduled/ recommended follow up visits, this note will serve as a discharge from care along with most recent update on progress.      Electronically signed by: Trent Carroll PT, DPT [id number]

## 2023-06-06 ENCOUNTER — HOSPITAL ENCOUNTER (OUTPATIENT)
Dept: PHYSICAL THERAPY | Age: 83
Setting detail: THERAPIES SERIES
Discharge: HOME | End: 2023-06-06
Payer: MEDICARE

## 2023-06-06 PROCEDURE — 97110 THERAPEUTIC EXERCISES: CPT | Performed by: PHYSICAL THERAPIST

## 2023-06-06 PROCEDURE — 97112 NEUROMUSCULAR REEDUCATION: CPT | Performed by: PHYSICAL THERAPIST

## 2023-06-06 NOTE — FLOWSHEET NOTE
rotation to upper cut isometric     External Rotation     Internal Rotation Increased popping 6/2 so held   Shrugs     Lats     Ext 3 x 10 green TB Open  hand   Flex     Scapular Retraction  3 x 10 orange TB    BIC     TRIC     PNF     UK flexion/deltoid retraining     Dynamic Stability     WBing shoulder taps     Ball on wall    Push ups on wall     Push ups on ball on wall     90/90 ball taps     Horizontal abd ball taps     120 overhead ball taps (Y position)     Plyoback          Manual treatment:             Modalities:     Mechanical cervical traction Flexion level 3     Patient education: Pt was educated on PT diagnosis, prognosis, and plan of care. Pt was educated on the use of ice throughout the day. Pt was educated on importance of correcting his/her posture throughout the day. Therapeutic Exercise and NMR EXR  [x] (25134) Provided verbal/tactile cueing for activities related to strengthening, flexibility, endurance, ROM  for improvements in scapular, scapulothoracic and UE control with self care, reaching, carrying, lifting, house/yardwork, driving/computer work.    [] (89693) Provided verbal/tactile cueing for activities related to improving balance, coordination, kinesthetic sense, posture, motor skill, proprioception  to assist with  scapular, scapulothoracic and UE control with self care, reaching, carrying, lifting, house/yardwork, driving/computer work. Therapeutic Activities:    [x] (50646 or 71605) Provided verbal/tactile cueing for activities related to improving balance, coordination, kinesthetic sense, posture, motor skill, proprioception and motor activation to allow for proper function of scapular, scapulothoracic and UE control with self care, carrying, lifting, driving/computer work.      Home Exercise Program:    [x] (48108) Reviewed/Progressed HEP activities related to strengthening, flexibility, endurance, ROM of scapular, scapulothoracic and UE control with self care,

## 2023-06-09 ENCOUNTER — APPOINTMENT (OUTPATIENT)
Dept: PHYSICAL THERAPY | Age: 83
End: 2023-06-09
Payer: MEDICARE

## 2023-06-15 ENCOUNTER — HOSPITAL ENCOUNTER (OUTPATIENT)
Dept: PHYSICAL THERAPY | Age: 83
Setting detail: THERAPIES SERIES
End: 2023-06-15
Payer: MEDICARE

## 2023-06-16 ENCOUNTER — APPOINTMENT (OUTPATIENT)
Dept: PHYSICAL THERAPY | Age: 83
End: 2023-06-16
Payer: MEDICARE

## 2023-06-20 ENCOUNTER — APPOINTMENT (OUTPATIENT)
Dept: PHYSICAL THERAPY | Age: 83
End: 2023-06-20
Payer: MEDICARE

## 2023-06-27 ENCOUNTER — HOSPITAL ENCOUNTER (OUTPATIENT)
Dept: NON INVASIVE DIAGNOSTICS | Age: 83
Discharge: HOME | End: 2023-06-27
Payer: MEDICARE

## 2023-06-27 DIAGNOSIS — I10 ESSENTIAL HYPERTENSION: ICD-10-CM

## 2023-06-27 DIAGNOSIS — I77.810 ASCENDING AORTA DILATION (HCC): ICD-10-CM

## 2023-06-27 DIAGNOSIS — I35.0 AORTIC STENOSIS, MILD: ICD-10-CM

## 2023-06-27 LAB
LV EF: 63 %
LVEF MODALITY: NORMAL

## 2023-06-27 PROCEDURE — 93306 TTE W/DOPPLER COMPLETE: CPT

## 2023-06-28 ENCOUNTER — TELEPHONE (OUTPATIENT)
Dept: CARDIOLOGY CLINIC | Age: 83
End: 2023-06-28

## 2023-07-07 ENCOUNTER — HOSPITAL ENCOUNTER (OUTPATIENT)
Dept: PHYSICAL THERAPY | Age: 83
Setting detail: THERAPIES SERIES
Discharge: HOME | End: 2023-07-07
Payer: MEDICARE

## 2023-07-07 PROCEDURE — 97110 THERAPEUTIC EXERCISES: CPT

## 2023-07-07 PROCEDURE — 97530 THERAPEUTIC ACTIVITIES: CPT

## 2023-07-07 PROCEDURE — 97140 MANUAL THERAPY 1/> REGIONS: CPT

## 2023-07-07 NOTE — FLOWSHEET NOTE
[address] [facility], [address] [place], [address] [facility]  Phone: [phone]   Fax: [phone]                                                 Physical Therapy Daily Treatment Note  Date:  2023     Patient Name:  Jaki Mei    :  1940  MRN: [id number]    Medical Diagnosis:  S/P arthroscopy of right shoulder [Z98.890] on 23  Procedure(s): #1 right shoulder arthroscopy with labral debridement #2 right shoulder arthroscopy with chondroplasty of the glenoid #3 right shoulder open Blessing #4 right shoulder subacromial decompression     M54.2 (ICD-10-CM) - Neck pain   Z98.890 (ICD-10-CM) - S/P arthroscopy of right shoulder   M50.30 (ICD-10-CM) - DDD (degenerative disc disease), cervical   M47.812 (ICD-10-CM) - Cervical spondylosis   M48.02 (ICD-10-CM) - Cervical stenosis of spine     Treatment Diagnosis:    ICD-10-CM    1. Acute pain of right shoulder  M25.511       2. Right arm weakness  R29.898       3. Shoulder stiffness, right  M25.611    4.  M54.2 (ICD-10-CM) - Neck pain     Insurance/Certification information:  PT Insurance Information: [facility] Medicare- no auth, [place]  Referring Provider:  Marlene Whalen MD  Has the plan of care been signed (Y/N):        [x]  Yes  []  No     Date of Patient follow up with Physician:       Is this a Progress Report:     [x]  Yes  []  No        If Yes:  Date Range for reporting period:  Beginning- 2023   Ending-23    Progress report will be due (10 Rx or 30 days whichever is less): 31      Recertification will be due (POC Duration  / 90 days whichever is less): as above        Visit # Insurance Allowable Auth Required   11 [place] []  Yes [x]  No        Functional Scale: UEFI 50%- mostly limited due to recent carpal tunnel sx B    Date assessed:  2023      Latex Allergy:  [x]NO      []YES  Preferred Language for Healthcare:   [x]English

## 2023-07-11 ENCOUNTER — HOSPITAL ENCOUNTER (OUTPATIENT)
Dept: PHYSICAL THERAPY | Age: 83
Setting detail: THERAPIES SERIES
Discharge: HOME | End: 2023-07-11
Payer: MEDICARE

## 2023-07-11 PROCEDURE — 97110 THERAPEUTIC EXERCISES: CPT | Performed by: PHYSICAL THERAPIST

## 2023-07-11 PROCEDURE — 97112 NEUROMUSCULAR REEDUCATION: CPT | Performed by: PHYSICAL THERAPIST

## 2023-07-11 PROCEDURE — 97530 THERAPEUTIC ACTIVITIES: CPT | Performed by: PHYSICAL THERAPIST

## 2023-07-11 NOTE — PLAN OF CARE
driving/computer work    Modalities:  will ice at home    Charges:  Timed Code Treatment Minutes: 44   Total Treatment Minutes: 46     [] EVAL (LOW) 64836   [] EVAL (MOD) 31054   [] EVAL (HIGH) 75205   [] RE-EVAL x 1  [x] CF(94115) x    1 [] IONTO  [x] NMR (86816) x 1    [] VASO  [] Manual (20229) x      [] Other:  [x] TA x 1    [] Mech Traction (98785)  [] ES(attended) (68194)      [] ES (un) (27492):       Marilee Rain stated goal: get back to normal- walking and doing yard work/mowing     Therapist goals for Patient:   Short Term Goals: To be achieved in: 2 weeks  1. Independent in HEP and progression per patient tolerance, in order to prevent re-injury. [] Progressing: [x] Met: [] Not Met: [] Adjusted   2. Patient will have a decrease in pain to facilitate improvement in movement, function, and ADLs as indicated by Functional Deficits. [] Progressing: [x] Met: [] Not Met: [] Adjusted     Long Term Goals: To be achieved in: 8 weeks  1. Functional index score of 92% or more for UEFI to assist with reaching prior level of function. [x] Progressing: [] Met: [x] Not Met: [] Adjusted  2. Patient will demonstrate increased R shoulder AROM to  150 flex, 160 abd, and ER at 70 to 90 degrees to allow for proper joint functioning as indicated by patients Functional Deficits. [x] Progressing: met all except ER [] Met: [x] Not Met: [] Adjusted  3. Patient will demonstrate an increase in R shoulder strength to 4/5 flex, abd, ER in UE to allow for proper functional mobility as indicated by patients Functional Deficits. [x] Progressing: met 4/5 but pain still with flex and abd resistance [] Met: [x] Not Met: [] Adjusted  4. Patient will return to ADLs above shoulder height without increased symptoms or restriction. [x] Progressing: [] Met: [x] Not Met: [] Adjusted  5.  Pt will return to mowing and doing yard-work without increased symptoms in R UE  [x] Progressing: limited activity post carpal tunnel sx[] Met:

## 2023-07-13 ENCOUNTER — OFFICE VISIT (OUTPATIENT)
Dept: CARDIOLOGY CLINIC | Age: 83
End: 2023-07-13
Payer: MEDICARE

## 2023-07-13 ENCOUNTER — TELEPHONE (OUTPATIENT)
Dept: CARDIOLOGY CLINIC | Age: 83
End: 2023-07-13

## 2023-07-13 VITALS
WEIGHT: 193 LBS | DIASTOLIC BLOOD PRESSURE: 66 MMHG | OXYGEN SATURATION: 97 % | SYSTOLIC BLOOD PRESSURE: 126 MMHG | HEIGHT: 67 IN | HEART RATE: 46 BPM | BODY MASS INDEX: 30.29 KG/M2

## 2023-07-13 DIAGNOSIS — R42 DIZZINESS: ICD-10-CM

## 2023-07-13 DIAGNOSIS — I10 ESSENTIAL HYPERTENSION: ICD-10-CM

## 2023-07-13 DIAGNOSIS — I35.0 NONRHEUMATIC AORTIC VALVE STENOSIS: Primary | ICD-10-CM

## 2023-07-13 PROCEDURE — 99215 OFFICE O/P EST HI 40 MIN: CPT | Performed by: INTERNAL MEDICINE

## 2023-07-13 PROCEDURE — 3078F DIAST BP <80 MM HG: CPT | Performed by: INTERNAL MEDICINE

## 2023-07-13 PROCEDURE — 3074F SYST BP LT 130 MM HG: CPT | Performed by: INTERNAL MEDICINE

## 2023-07-13 PROCEDURE — 1124F ACP DISCUSS-NO DSCNMKR DOCD: CPT | Performed by: INTERNAL MEDICINE

## 2023-07-13 PROCEDURE — 93000 ELECTROCARDIOGRAM COMPLETE: CPT | Performed by: INTERNAL MEDICINE

## 2023-07-13 NOTE — PROGRESS NOTES
[address] [facility]  Cardiology Consult    Deepak Gomez  1940    July 13, 2023      Reason for Referral: Aortic stenosis    Referring physician: Dr Emily Lazar    CC: \"I am here to discuss my echocardiogram.\"      Subjective:     History of Present Illness:    Deepak Gomez is a 82 y.o. patient with a PMH significant for aortic stenosis,hypertension, and hyperlipidemia. He had an echocardiogram which showed severe aortic stenosis and a referral was placed for evaluation of TAVR. Today, he is here to discuss TAVR. He has noticed that he has had increased dizziness with mowing his grass and shortness of breath. Patient denies exertional chest pain/pressure, dyspnea at rest, PND, orthopnea, palpitations, weight changes, changes in LE edema, and syncope. 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 Surgical History:   has a past surgical history that includes colectomy; bladder repair (7/23/1998); Colonoscopy; Tonsillectomy (1947); tumor removal (1970); Skin cancer excision (9/30/2013); hernia repair (1969, 1999, 2005); and Shoulder arthroscopy (Right, 4/12/2023). Social History:   reports that he quit smoking about 46 years ago. His smoking use included cigarettes. He has a 18.00 pack-year smoking history. He has never used smokeless tobacco. He reports current alcohol use of about 3.0 - 4.0 standard drinks per week. He reports that he does not use drugs.      Family History:  family history includes Cancer (age of onset: 68) in his sister;

## 2023-07-17 ENCOUNTER — HOSPITAL ENCOUNTER (OUTPATIENT)
Dept: PHYSICAL THERAPY | Age: 83
Setting detail: THERAPIES SERIES
Discharge: HOME | End: 2023-07-17
Payer: MEDICARE

## 2023-07-17 ENCOUNTER — OFFICE VISIT (OUTPATIENT)
Dept: FAMILY MEDICINE CLINIC | Age: 83
End: 2023-07-17
Payer: MEDICARE

## 2023-07-17 ENCOUNTER — TELEPHONE (OUTPATIENT)
Dept: CARDIOLOGY CLINIC | Age: 83
End: 2023-07-17

## 2023-07-17 VITALS
OXYGEN SATURATION: 98 % | HEART RATE: 64 BPM | WEIGHT: 193 LBS | BODY MASS INDEX: 30.29 KG/M2 | DIASTOLIC BLOOD PRESSURE: 64 MMHG | HEIGHT: 67 IN | SYSTOLIC BLOOD PRESSURE: 122 MMHG | TEMPERATURE: 97.6 F

## 2023-07-17 DIAGNOSIS — M79.89 SWELLING OF CALF: Primary | ICD-10-CM

## 2023-07-17 DIAGNOSIS — M79.662 PAIN OF LEFT CALF: ICD-10-CM

## 2023-07-17 PROCEDURE — 97112 NEUROMUSCULAR REEDUCATION: CPT

## 2023-07-17 PROCEDURE — 1124F ACP DISCUSS-NO DSCNMKR DOCD: CPT | Performed by: NURSE PRACTITIONER

## 2023-07-17 PROCEDURE — 3074F SYST BP LT 130 MM HG: CPT | Performed by: NURSE PRACTITIONER

## 2023-07-17 PROCEDURE — 99213 OFFICE O/P EST LOW 20 MIN: CPT | Performed by: NURSE PRACTITIONER

## 2023-07-17 PROCEDURE — 97530 THERAPEUTIC ACTIVITIES: CPT

## 2023-07-17 PROCEDURE — 97110 THERAPEUTIC EXERCISES: CPT

## 2023-07-17 PROCEDURE — 3078F DIAST BP <80 MM HG: CPT | Performed by: NURSE PRACTITIONER

## 2023-07-17 ASSESSMENT — ENCOUNTER SYMPTOMS
SHORTNESS OF BREATH: 0
COUGH: 1
WHEEZING: 0
ABDOMINAL PAIN: 0

## 2023-07-17 NOTE — PROGRESS NOTES
Philomena rBuner (:  1940) is a 82 y.o. male,Established patient, here for evaluation of the following chief complaint(s):  Joint Swelling (Left ankle swelling. No pain. Has been swollen for the past 2 weeks. It is tender to touch all through his left calf when touched    )         ASSESSMENT/PLAN:  1. Swelling of calf  - US DUP LOWER EXTREMITY LEFT LEANDER; Future  2. Pain of left calf  - US DUP LOWER EXTREMITY LEFT LEANDER; Future   New, worsening. Get STAT imaging today. Follow pt education included. Return if symptoms worsen or fail to improve. Subjective   SUBJECTIVE/OBJECTIVE:  HPI  Presents today for left calf  swelling, redness and tenderness for the last 2 wks, slowly progressing. Pt states he was in PT and the told him to see his PCP. Denies CP, palpitations or SOB. Recently dx with severe Aortic Stenosis and dr. Tatiana Dugan is going to do HVR. Current Outpatient Medications   Medication Sig Dispense Refill    allopurinol (ZYLOPRIM) 300 MG tablet TAKE ONE TABLET BY MOUTH DAILY 90 tablet 1    hydrocortisone 2.5 % cream       ezetimibe-simvastatin (VYTORIN) 10-40 MG per tablet TAKE ONE TABLET BY MOUTH DAILY 90 tablet 2    hydrocortisone (ANUSOL-HC) 2.5 % CREA rectal cream To be applied locally. 28 g 5    hydrocortisone (LOCOID) 0.1 % OINT oitment APPLY UP TO TWICE A DAY AS NEEDED FOR RASH 45 g 0    lisinopril-hydroCHLOROthiazide (PRINZIDE;ZESTORETIC) 20-12.5 MG per tablet Take 2 tablets by mouth daily 180 tablet 1    tamsulosin (FLOMAX) 0.4 MG capsule Take 1 capsule by mouth daily 90 capsule 1    MAGNESIUM PO Take 250 mg by mouth       No current facility-administered medications for this visit.         Past Medical History:   Diagnosis Date    Abnormal breath sounds 12/3/2019    Aortic stenosis, mild 2018    Echo 3/30/18 - mild  with preserved EF    Calculus of gallbladder without cholecystitis without obstruction 2019    Colitis     Dermatitis 2019    Diverticulitis

## 2023-07-17 NOTE — FLOWSHEET NOTE
[address] [facility], [address] [facility] [place], [address] [facility]  Phone: [phone]   Fax: [phone]                               Physical Therapy Daily Treatment Note  Date:  2023     Patient Name:  Marcus Singer    :  1940  MRN: [id number]    Medical Diagnosis:  S/P arthroscopy of right shoulder [Z98.890] on 23  Procedure(s): #1 right shoulder arthroscopy with labral debridement #2 right shoulder arthroscopy with chondroplasty of the glenoid #3 right shoulder open Blessing #4 right shoulder subacromial decompression     M54.2 (ICD-10-CM) - Neck pain   Z98.890 (ICD-10-CM) - S/P arthroscopy of right shoulder   M50.30 (ICD-10-CM) - DDD (degenerative disc disease), cervical   M47.812 (ICD-10-CM) - Cervical spondylosis   M48.02 (ICD-10-CM) - Cervical stenosis of spine     Treatment Diagnosis:    ICD-10-CM    1. Acute pain of right shoulder  M25.511       2. Right arm weakness  R29.898       3. Shoulder stiffness, right  M25.611    4.  M54.2 (ICD-10-CM) - Neck pain     Insurance/Certification information:  PT Insurance Information: [facility] Medicare- no auth, [place]  Referring Provider:  Nelly Royal MD  Has the plan of care been signed (Y/N):        [x]  Yes  []  No     Date of Patient follow up with Physician:       Is this a Progress Report:     [x]  Yes  []  No        If Yes:  Date Range for reporting period:  Beginning- 2023   Ending-23    Progress report will be due (10 Rx or 30 days whichever is less): 3/35/65      Recertification will be due (POC Duration  / 90 days whichever is less): as above        Visit # Insurance Allowable Auth Required   13 [place] []  Yes [x]  No        Functional Scale: UEFI 88.75%- mostly limited due to recent carpal tunnel sx B    Date assessed:  2023      Latex Allergy:  [x]NO      []YES  Preferred Language for Healthcare:   [x]English

## 2023-07-17 NOTE — TELEPHONE ENCOUNTER
Asa Keepers, when scheduling these tests, Luan Womack stated that Dr. Candance Monica wanted him to see an EP provider for a possible pacemaker prior to the [facility]. Can you call the pt to advise? Thank you.
Kathleen Payment for pt to call me.  Billy BAILEY
Marsha, can you schedule pt for a cath with Dr. Silvia Granados. He has no dye allergy, no blood thinner. He will also need TAVR CTA chest/abd/pelvis and carotid US. He has no port and lives at home. Labs ordered.  THanks, CHRIS Lam RN
cannot be completed. 12:30 pm - CAROTID STUDY  * Please wear easy to remove clothing  * Please take all medication, unless otherwise instructed  * You will be here for approximately 1 hour      Lab work will be drawn 5-7 days prior to 8-30-23.

## 2023-07-17 NOTE — TELEPHONE ENCOUNTER
Spoke to pt to let him know that Dr. Shilpi South viewed ECG and informed no need for PPM at this time based on ECG. Pt verbalized understanding. He also states that he saw his PCP today for L KAUR and pain in his calf. He is scheduled for an US tomorrow to r/o DVT.  Billy BAILEY

## 2023-07-18 ENCOUNTER — TELEPHONE (OUTPATIENT)
Dept: FAMILY MEDICINE CLINIC | Age: 83
End: 2023-07-18

## 2023-07-18 ENCOUNTER — HOSPITAL ENCOUNTER (OUTPATIENT)
Dept: VASCULAR LAB | Age: 83
Discharge: HOME | End: 2023-07-18
Payer: MEDICARE

## 2023-07-18 DIAGNOSIS — M79.89 SWELLING OF CALF: ICD-10-CM

## 2023-07-18 DIAGNOSIS — M79.662 PAIN OF LEFT CALF: ICD-10-CM

## 2023-07-18 PROCEDURE — 93971 EXTREMITY STUDY: CPT

## 2023-07-18 NOTE — TELEPHONE ENCOUNTER
Spoke with monik at vascular lab, preliminary read on Doppler is negative for DVT.  Up dated Pt, verbalized understanding

## 2023-07-20 ENCOUNTER — HOSPITAL ENCOUNTER (OUTPATIENT)
Dept: PHYSICAL THERAPY | Age: 83
Setting detail: THERAPIES SERIES
Discharge: HOME | End: 2023-07-20
Payer: MEDICARE

## 2023-07-20 PROCEDURE — 97110 THERAPEUTIC EXERCISES: CPT

## 2023-07-20 PROCEDURE — 97112 NEUROMUSCULAR REEDUCATION: CPT

## 2023-07-20 NOTE — FLOWSHEET NOTE
[address], [address] [facility] [address], [address] [facility]  Phone: [phone]   Fax: [phone]                               Physical Therapy Daily Treatment Note  Date:  2023     Patient Name:  Jaki Mei    :  1940  MRN: [id number]    Medical Diagnosis:  S/P arthroscopy of right shoulder [Z98.890] on 23  Procedure(s): #1 right shoulder arthroscopy with labral debridement #2 right shoulder arthroscopy with chondroplasty of the glenoid #3 right shoulder open Blessing #4 right shoulder subacromial decompression     M54.2 (ICD-10-CM) - Neck pain   Z98.890 (ICD-10-CM) - S/P arthroscopy of right shoulder   M50.30 (ICD-10-CM) - DDD (degenerative disc disease), cervical   M47.812 (ICD-10-CM) - Cervical spondylosis   M48.02 (ICD-10-CM) - Cervical stenosis of spine     Treatment Diagnosis:    ICD-10-CM    1. Acute pain of right shoulder  M25.511       2. Right arm weakness  R29.898       3. Shoulder stiffness, right  M25.611    4.  M54.2 (ICD-10-CM) - Neck pain     Insurance/Certification information:  PT Insurance Information: [facility] Medicare- no auth, [place]  Referring Provider:  Marlene Whalen MD  Has the plan of care been signed (Y/N):        [x]  Yes  []  No     Date of Patient follow up with Physician:       Is this a Progress Report:     [x]  Yes  []  No        If Yes:  Date Range for reporting period:  Beginning- 2023   Ending-23    Progress report will be due (10 Rx or 30 days whichever is less):       Recertification will be due (POC Duration  / 90 days whichever is less): as above        Visit # Insurance Allowable Auth Required   14 [place] []  Yes [x]  No        Functional Scale: UEFI 88.75%- mostly limited due to recent carpal tunnel sx B    Date assessed:  2023      Latex Allergy:  [x]NO      []YES  Preferred Language for Healthcare:   [x]English

## 2023-07-25 ENCOUNTER — HOSPITAL ENCOUNTER (OUTPATIENT)
Dept: PHYSICAL THERAPY | Age: 83
Setting detail: THERAPIES SERIES
Discharge: HOME | End: 2023-07-25
Payer: MEDICARE

## 2023-07-25 PROCEDURE — 97112 NEUROMUSCULAR REEDUCATION: CPT | Performed by: PHYSICAL THERAPIST

## 2023-07-25 PROCEDURE — 97110 THERAPEUTIC EXERCISES: CPT | Performed by: PHYSICAL THERAPIST

## 2023-07-25 NOTE — FLOWSHEET NOTE
(HIGH) 93725   [] RE-EVAL x 1  [x] GX(99968) x    2 [] IONTO  [x] NMR (80332) x 1    [] VASO  [] Manual (86952) x      [] Other:  [] TA x 1    [] Mech Traction (85054)  [] ES(attended) (36133)      [] ES (un) (28433):       Marilee Rain stated goal: get back to normal- walking and doing yard work/mowing     Therapist goals for Patient:   Short Term Goals: To be achieved in: 2 weeks  1. Independent in HEP and progression per patient tolerance, in order to prevent re-injury. [] Progressing: [x] Met: [] Not Met: [] Adjusted   2. Patient will have a decrease in pain to facilitate improvement in movement, function, and ADLs as indicated by Functional Deficits. [] Progressing: [x] Met: [] Not Met: [] Adjusted     Long Term Goals: To be achieved in: 8 weeks  1. Functional index score of 92% or more for UEFI to assist with reaching prior level of function. [x] Progressing: [] Met: [x] Not Met: [] Adjusted  2. Patient will demonstrate increased R shoulder AROM to  150 flex, 160 abd, and ER at 70 to 90 degrees to allow for proper joint functioning as indicated by patients Functional Deficits. [x] Progressing: met all except ER [] Met: [x] Not Met: [] Adjusted  3. Patient will demonstrate an increase in R shoulder strength to 4/5 flex, abd, ER in UE to allow for proper functional mobility as indicated by patients Functional Deficits. [x] Progressing: met 4/5 but pain still with flex and abd resistance [] Met: [x] Not Met: [] Adjusted  4. Patient will return to ADLs above shoulder height without increased symptoms or restriction. [x] Progressing: [] Met: [x] Not Met: [] Adjusted  5. Pt will return to mowing and doing yard-work without increased symptoms in R UE  [x] Progressing: limited activity post carpal tunnel sx[] Met: [x] Not Met: [] Adjusted                   Overall Progression Towards Functional goals/ Treatment Progress Update:  [x] Patient is progressing as expected towards functional goals listed.

## 2023-07-28 ENCOUNTER — TELEPHONE (OUTPATIENT)
Dept: CARDIOLOGY | Age: 83
End: 2023-07-28

## 2023-07-28 ENCOUNTER — HOSPITAL ENCOUNTER (OUTPATIENT)
Dept: PHYSICAL THERAPY | Age: 83
Setting detail: THERAPIES SERIES
Discharge: HOME | End: 2023-07-28
Payer: MEDICARE

## 2023-07-28 PROCEDURE — 97112 NEUROMUSCULAR REEDUCATION: CPT | Performed by: PHYSICAL THERAPIST

## 2023-07-28 PROCEDURE — 97110 THERAPEUTIC EXERCISES: CPT | Performed by: PHYSICAL THERAPIST

## 2023-07-28 NOTE — TELEPHONE ENCOUNTER
Spoke with Rustam Santiago, the Carotid & CTA have been moved to 8-24-23 at [facility].

## 2023-07-28 NOTE — FLOWSHEET NOTE
[address] [facility], [address] [facility] [address], [address] [facility]  Phone: [phone]   Fax: [phone]                               Physical Therapy Daily Treatment Note  Date:  2023     Patient Name:  Teresa Tellez    :  1940  MRN: [id number]    Medical Diagnosis:  S/P arthroscopy of right shoulder [Z98.890] on 23  Procedure(s): #1 right shoulder arthroscopy with labral debridement #2 right shoulder arthroscopy with chondroplasty of the glenoid #3 right shoulder open Blessing #4 right shoulder subacromial decompression     M54.2 (ICD-10-CM) - Neck pain   Z98.890 (ICD-10-CM) - S/P arthroscopy of right shoulder   M50.30 (ICD-10-CM) - DDD (degenerative disc disease), cervical   M47.812 (ICD-10-CM) - Cervical spondylosis   M48.02 (ICD-10-CM) - Cervical stenosis of spine     Treatment Diagnosis:    ICD-10-CM    1. Acute pain of right shoulder  M25.511       2. Right arm weakness  R29.898       3. Shoulder stiffness, right  M25.611    4.  M54.2 (ICD-10-CM) - Neck pain     Insurance/Certification information:  PT Insurance Information: [facility] Medicare- no auth, [place]  Referring Provider:  Zackery Malone MD  Has the plan of care been signed (Y/N):        [x]  Yes  []  No     Date of Patient follow up with Physician:       Is this a Progress Report:     [x]  Yes  []  No        If Yes:  Date Range for reporting period:  Beginning- 2023   Ending-23    Progress report will be due (10 Rx or 30 days whichever is less): 05      Recertification will be due (POC Duration  / 90 days whichever is less): as above        Visit # Insurance Allowable Auth Required   16 [place] []  Yes [x]  No        Functional Scale: UEFI 88.75%- mostly limited due to recent carpal tunnel sx B    Date assessed:  2023      Latex Allergy:  [x]NO      []YES  Preferred Language for Healthcare:   [x]English

## 2023-07-28 NOTE — TELEPHONE ENCOUNTER
Pt calling with complaints of feeling like when he falls asleep, he cannot as he is \"worried about his breathing\". States that he feels anxious when trying to go to sleep for the night. Denies orthopnea (states able to lay flat with out SOB, and still feels the anxiety with reclining in a chair to sleep). Denies weight gain, LE edema. Denies symptoms of sleep apnea. States took PO when this occurs; 96%. /65 with HR in the 70's all consistent. Reviewed meds and consistent with them. States he wants to try melatonin to help fall asleep as he feels these symptoms are due to \"not being able to turn his mind off\". We reviewed upcoming testing and projected TAVR date. He admits to being very nervous about his condition and is anxious to get it done. All discussed with Dr. Sergio Bellamy.  Billy BAILEY

## 2023-08-01 ENCOUNTER — HOSPITAL ENCOUNTER (OUTPATIENT)
Dept: PHYSICAL THERAPY | Age: 83
Setting detail: THERAPIES SERIES
Discharge: HOME | End: 2023-08-01
Payer: MEDICARE

## 2023-08-01 PROCEDURE — 97112 NEUROMUSCULAR REEDUCATION: CPT | Performed by: PHYSICAL THERAPIST

## 2023-08-01 PROCEDURE — 97110 THERAPEUTIC EXERCISES: CPT | Performed by: PHYSICAL THERAPIST

## 2023-08-01 NOTE — FLOWSHEET NOTE
pain in B shoulders with cleaning and other activities away from body/overhead. 7/17: Cues for reps and to stabilize shoulder blade with exercises to avoid popping. Assistance with positioning for exercises, as needed. *Called patient's PCP and recommended he be seen for LE swelling/tenderness. Office was able to get him in right away  7/20: Patient tolerated session well. No complaints of discomfort or popping with exercises. 7/25: pt was progressed in resistance and to a couple new exercises for shoulder strengthening today showing improving strength and endurance. He reported he felt good by end of session with no pain. He will continue with current HEP  7/28:pt was able to progress sets and reps of exercises throughout session today showing improved shoulder strength and endurance. Pt was cued to avoid shoulder shrug and improve scapular retraction with several exercises in session. Also cued for improved range and slower control on exercises for improved benefit. 8/1: Pt did well in session today. Did need cues to improve muscle activation and slow down on exercises throughout session. He was fatigued in shoulders by end of session but not painful. Prognosis: [x] Good [] Fair  [] Poor    Patient Requires Follow-up: [x] Yes  [] No    PLAN: 1-2x/week for 5 weeks (7/11/23); [x] Continue per plan of care [] Alter current plan (see comments above)  [] Plan of care initiated [] Hold pending MD visit [] Discharge    Note: If patient does not return for scheduled/ recommended follow up visits, this note will serve as a discharge from care along with most recent update on progress.      Electronically signed by: Theresa Sen, PT, DPT [id number]

## 2023-08-07 ENCOUNTER — OFFICE VISIT (OUTPATIENT)
Dept: PULMONOLOGY | Age: 83
End: 2023-08-07
Payer: MEDICARE

## 2023-08-07 VITALS
RESPIRATION RATE: 18 BRPM | WEIGHT: 193.2 LBS | TEMPERATURE: 97.8 F | BODY MASS INDEX: 30.32 KG/M2 | OXYGEN SATURATION: 98 % | SYSTOLIC BLOOD PRESSURE: 140 MMHG | HEIGHT: 67 IN | HEART RATE: 47 BPM | DIASTOLIC BLOOD PRESSURE: 78 MMHG

## 2023-08-07 DIAGNOSIS — R91.8 PULMONARY NODULES: ICD-10-CM

## 2023-08-07 DIAGNOSIS — J61 ASBESTOSIS (HCC): Primary | ICD-10-CM

## 2023-08-07 DIAGNOSIS — R93.89 ABNORMAL CT OF THE CHEST: ICD-10-CM

## 2023-08-07 PROCEDURE — 3077F SYST BP >= 140 MM HG: CPT | Performed by: INTERNAL MEDICINE

## 2023-08-07 PROCEDURE — 3078F DIAST BP <80 MM HG: CPT | Performed by: INTERNAL MEDICINE

## 2023-08-07 PROCEDURE — 1124F ACP DISCUSS-NO DSCNMKR DOCD: CPT | Performed by: INTERNAL MEDICINE

## 2023-08-07 PROCEDURE — 99214 OFFICE O/P EST MOD 30 MIN: CPT | Performed by: INTERNAL MEDICINE

## 2023-08-07 NOTE — PATIENT INSTRUCTIONS
Get breathing studies at some point (ok to get heart work up completed first)    Follow up in 6 months

## 2023-08-08 ENCOUNTER — HOSPITAL ENCOUNTER (OUTPATIENT)
Dept: PHYSICAL THERAPY | Age: 83
Setting detail: THERAPIES SERIES
Discharge: HOME | End: 2023-08-08
Payer: MEDICARE

## 2023-08-08 PROCEDURE — 97112 NEUROMUSCULAR REEDUCATION: CPT | Performed by: PHYSICAL THERAPIST

## 2023-08-08 PROCEDURE — 97110 THERAPEUTIC EXERCISES: CPT | Performed by: PHYSICAL THERAPIST

## 2023-08-08 NOTE — FLOWSHEET NOTE
motor skill, proprioception of scapular, scapulothoracic and UE control with self care, reaching, carrying, lifting, house/yardwork, driving/computer work    Access Code: [email or web address]  URL: [employer]/  Date: 07/11/2023  Prepared by: Brian Rader    Exercises  - Shoulder Flexion Wall Slide with Towel  - 2 x daily - 7 x weekly - 1 sets - 10 reps - 10 hold  - Standing Shoulder Abduction Wall Slide with Thumb Out  - 3 x daily - 7 x weekly - 1 sets - 10 reps - 10 hold  - Standing Shoulder External Rotation Stretch in Doorway  - 1 x daily - 7 x weekly - 1 sets - 10 reps - 10 hold  - Doorway Pec Stretch at 90 Degrees Abduction  - 1 x daily - 7 x weekly - 1 sets - 10 reps - 10 hold  - Supine Shoulder External Rotation in Scaption AAROM  - 2 x daily - 7 x weekly - 1 sets - 10 reps - 10 hold  - Standing Shoulder Internal Rotation Stretch with Dowel  - 2 x daily - 7 x weekly - 1 sets - 10 reps - 10 hold  - Sleeper Stretch  - 2 x daily - 7 x weekly - 1 sets - 10 reps - 10 hold  - Sidelying Shoulder ER with Towel and Dumbbell  - 1 x daily - 3 x weekly - 3 sets - 10 reps  - Sidelying Shoulder Abduction Palm Forward  - 1 x daily - 3 x weekly - 3 sets - 10 reps  - Shoulder Internal Rotation with Resistance  - 1 x daily - 3 x weekly - 3 sets - 10 reps  - Shoulder extension with resistance - Neutral  - 1 x daily - 3 x weekly - 3 sets - 10 reps  - Scapular Retraction with Resistance  - 1 x daily - 3 x weekly - 3 sets - 10 reps  - Standing Shoulder Scaption  - 1 x daily - 3 x weekly - 3 sets - 10 reps  - Standing Bicep Curls Supinated with Dumbbells  - 1 x daily - 3 x weekly - 3 sets - 10 reps  - Single Arm Bent Over Shoulder Horizontal Abduction with Dumbbell - Palm Down  - 1 x daily - 3 x weekly - 2-3 sets - 10 reps  - Seated Cervical Retraction  - 2 x daily - 7 x weekly - 1 sets - 10 reps - 5 hold  - Seated Scapular Retraction  - 2 x daily - 7 x weekly - 1 sets - 10 reps - 5 hold  Manual Treatments:  PROM / STM /

## 2023-08-15 ENCOUNTER — HOSPITAL ENCOUNTER (OUTPATIENT)
Dept: PHYSICAL THERAPY | Age: 83
Setting detail: THERAPIES SERIES
Discharge: HOME | End: 2023-08-15
Payer: MEDICARE

## 2023-08-15 PROCEDURE — 97110 THERAPEUTIC EXERCISES: CPT | Performed by: PHYSICAL THERAPIST

## 2023-08-15 PROCEDURE — 97530 THERAPEUTIC ACTIVITIES: CPT | Performed by: PHYSICAL THERAPIST

## 2023-08-15 PROCEDURE — 97112 NEUROMUSCULAR REEDUCATION: CPT | Performed by: PHYSICAL THERAPIST

## 2023-08-15 NOTE — PLAN OF CARE
08/15/2023  Prepared by: Roque Looney    Exercises  - Shoulder Flexion Wall Slide with Towel  - 2 x daily - 7 x weekly - 1 sets - 10 reps - 10 hold  - Standing Shoulder Abduction Wall Slide with Thumb Out  - 3 x daily - 7 x weekly - 1 sets - 10 reps - 10 hold  - Standing Shoulder External Rotation Stretch in Doorway  - 1 x daily - 7 x weekly - 1 sets - 10 reps - 10 hold  - Doorway Pec Stretch at 90 Degrees Abduction  - 1 x daily - 7 x weekly - 1 sets - 10 reps - 10 hold  - Supine Shoulder External Rotation with Dowel  - 2 x daily - 7 x weekly - 1 sets - 10 reps - 10 hold  - Supine Shoulder External Rotation in Scaption AAROM  - 2 x daily - 7 x weekly - 1 sets - 10 reps - 10 hold  - Standing Shoulder Internal Rotation Stretch with Dowel  - 2 x daily - 7 x weekly - 1 sets - 10 reps - 10 hold  - Sleeper Stretch  - 2 x daily - 7 x weekly - 1 sets - 10 reps - 10 hold  - Sidelying Shoulder Abduction Palm Forward  - 1 x daily - 3 x weekly - 3 sets - 10 reps  - Sidelying Shoulder Horizontal Abduction  - 1 x daily - 7 x weekly - 3 sets - 10 reps  - Sidelying Shoulder ER with Towel and Dumbbell  - 1 x daily - 3 x weekly - 3 sets - 10 reps  - Shoulder External Rotation with Anchored Resistance  - 1 x daily - 7 x weekly - 3 sets - 10 reps  - Shoulder Internal Rotation with Resistance  - 1 x daily - 3 x weekly - 3 sets - 10 reps  - Shoulder extension with resistance - Neutral  - 1 x daily - 3 x weekly - 3 sets - 10 reps  - Scapular Retraction with Resistance  - 1 x daily - 3 x weekly - 3 sets - 10 reps  - Standing Shoulder Scaption  - 1 x daily - 3 x weekly - 3 sets - 10 reps  - Standing Bicep Curls Supinated with Dumbbells  - 1 x daily - 3 x weekly - 3 sets - 10 reps  - Standing Shoulder Horizontal Abduction with Resistance  - 1 x daily - 7 x weekly - 3 sets - 10 reps  - Seated Cervical Retraction  - 2 x daily - 7 x weekly - 1 sets - 10 reps - 5 hold  - Seated Scapular Retraction  - 2 x daily - 7 x weekly - 1 sets - 10 reps -

## 2023-08-22 ENCOUNTER — APPOINTMENT (OUTPATIENT)
Dept: PHYSICAL THERAPY | Age: 83
End: 2023-08-22
Payer: MEDICARE

## 2023-08-23 ENCOUNTER — HOSPITAL ENCOUNTER (OUTPATIENT)
Age: 83
Discharge: HOME | End: 2023-08-23
Payer: MEDICARE

## 2023-08-23 DIAGNOSIS — I35.0 NONRHEUMATIC AORTIC VALVE STENOSIS: ICD-10-CM

## 2023-08-23 LAB
ANION GAP SERPL CALCULATED.3IONS-SCNC: 13 MMOL/L (ref 3–16)
BUN SERPL-MCNC: 30 MG/DL (ref 7–20)
CALCIUM SERPL-MCNC: 9.2 MG/DL (ref 8.3–10.6)
CHLORIDE SERPL-SCNC: 106 MMOL/L (ref 99–110)
CO2 SERPL-SCNC: 26 MMOL/L (ref 21–32)
CREAT SERPL-MCNC: 1.1 MG/DL (ref 0.8–1.3)
DEPRECATED RDW RBC AUTO: 15.7 % (ref 12.4–15.4)
GFR SERPLBLD CREATININE-BSD FMLA CKD-EPI: >60 ML/MIN/{1.73_M2}
GLUCOSE SERPL-MCNC: 93 MG/DL (ref 70–99)
HCT VFR BLD AUTO: 36.4 % (ref 40.5–52.5)
HGB BLD-MCNC: 12.2 G/DL (ref 13.5–17.5)
MCH RBC QN AUTO: 29.6 PG (ref 26–34)
MCHC RBC AUTO-ENTMCNC: 33.4 G/DL (ref 31–36)
MCV RBC AUTO: 88.4 FL (ref 80–100)
PLATELET # BLD AUTO: 312 K/UL (ref 135–450)
PMV BLD AUTO: 9.3 FL (ref 5–10.5)
POTASSIUM SERPL-SCNC: 3.6 MMOL/L (ref 3.5–5.1)
RBC # BLD AUTO: 4.12 M/UL (ref 4.2–5.9)
SODIUM SERPL-SCNC: 145 MMOL/L (ref 136–145)
WBC # BLD AUTO: 9.6 K/UL (ref 4–11)

## 2023-08-23 PROCEDURE — 85027 COMPLETE CBC AUTOMATED: CPT

## 2023-08-23 PROCEDURE — 36415 COLL VENOUS BLD VENIPUNCTURE: CPT

## 2023-08-23 PROCEDURE — 80048 BASIC METABOLIC PNL TOTAL CA: CPT

## 2023-08-24 ENCOUNTER — HOSPITAL ENCOUNTER (OUTPATIENT)
Dept: CT IMAGING | Age: 83
Discharge: HOME | End: 2023-08-24
Payer: MEDICARE

## 2023-08-24 ENCOUNTER — HOSPITAL ENCOUNTER (OUTPATIENT)
Dept: VASCULAR LAB | Age: 83
Discharge: HOME | End: 2023-08-24
Payer: MEDICARE

## 2023-08-24 ENCOUNTER — HOSPITAL ENCOUNTER (OUTPATIENT)
Dept: PULMONOLOGY | Age: 83
Discharge: HOME | End: 2023-08-24
Attending: INTERNAL MEDICINE
Payer: MEDICARE

## 2023-08-24 DIAGNOSIS — R91.8 PULMONARY NODULES: ICD-10-CM

## 2023-08-24 DIAGNOSIS — J61 ASBESTOSIS (HCC): ICD-10-CM

## 2023-08-24 DIAGNOSIS — I35.0 NONRHEUMATIC AORTIC VALVE STENOSIS: ICD-10-CM

## 2023-08-24 DIAGNOSIS — R42 DIZZINESS: ICD-10-CM

## 2023-08-24 LAB
DLCO %PRED: 63 %
DLCO PRED: NORMAL
DLCO/VA %PRED: NORMAL
DLCO/VA PRED: NORMAL
DLCO/VA: NORMAL
DLCO: NORMAL
EXPIRATORY TIME-POST: NORMAL
EXPIRATORY TIME: NORMAL
FEF 25-75% %CHNG: NORMAL
FEF 25-75% %PRED-POST: NORMAL
FEF 25-75% %PRED-PRE: NORMAL
FEF 25-75% PRED: NORMAL
FEF 25-75%-POST: NORMAL
FEF 25-75%-PRE: NORMAL
FEV1 %PRED-POST: 96 %
FEV1 %PRED-PRE: 90 %
FEV1 PRED: NORMAL
FEV1-POST: NORMAL
FEV1-PRE: NORMAL
FEV1/FVC %PRED-POST: NORMAL
FEV1/FVC %PRED-PRE: NORMAL
FEV1/FVC PRED: NORMAL
FEV1/FVC-POST: NORMAL
FEV1/FVC-PRE: NORMAL
FVC %PRED-POST: 76 L
FVC %PRED-PRE: 75 %
FVC PRED: NORMAL
FVC-POST: NORMAL
FVC-PRE: NORMAL
GAW %PRED: NORMAL
GAW PRED: NORMAL
GAW: NORMAL
IC %PRED: NORMAL
IC PRED: NORMAL
IC: NORMAL
MEP: NORMAL
MIP: NORMAL
MVV %PRED-PRE: NORMAL
MVV PRED: NORMAL
MVV-PRE: NORMAL
PEF %PRED-POST: NORMAL
PEF %PRED-PRE: NORMAL
PEF PRED: NORMAL
PEF%CHNG: NORMAL
PEF-POST: NORMAL
PEF-PRE: NORMAL
RAW %PRED: NORMAL
RAW PRED: NORMAL
RAW: NORMAL
RV %PRED: NORMAL
RV PRED: NORMAL
RV: NORMAL
SVC %PRED: NORMAL
SVC PRED: NORMAL
SVC: NORMAL
TLC %PRED: 64 %
TLC PRED: NORMAL
TLC: NORMAL
VA %PRED: NORMAL
VA PRED: NORMAL
VA: NORMAL
VTG %PRED: NORMAL
VTG PRED: NORMAL
VTG: NORMAL

## 2023-08-24 PROCEDURE — 94060 EVALUATION OF WHEEZING: CPT

## 2023-08-24 PROCEDURE — 93880 EXTRACRANIAL BILAT STUDY: CPT

## 2023-08-24 PROCEDURE — 6370000000 HC RX 637 (ALT 250 FOR IP): Performed by: INTERNAL MEDICINE

## 2023-08-24 PROCEDURE — 94729 DIFFUSING CAPACITY: CPT

## 2023-08-24 PROCEDURE — 74174 CTA ABD&PLVS W/CONTRAST: CPT

## 2023-08-24 PROCEDURE — 94726 PLETHYSMOGRAPHY LUNG VOLUMES: CPT

## 2023-08-24 PROCEDURE — 6360000004 HC RX CONTRAST MEDICATION: Performed by: INTERNAL MEDICINE

## 2023-08-24 PROCEDURE — 94640 AIRWAY INHALATION TREATMENT: CPT

## 2023-08-24 RX ORDER — ALBUTEROL SULFATE 90 UG/1
4 AEROSOL, METERED RESPIRATORY (INHALATION) ONCE
Status: COMPLETED | OUTPATIENT
Start: 2023-08-24 | End: 2023-08-24

## 2023-08-24 RX ADMIN — ALBUTEROL SULFATE 4 PUFF: 90 AEROSOL, METERED RESPIRATORY (INHALATION) at 08:38

## 2023-08-24 RX ADMIN — IOPAMIDOL 150 ML: 755 INJECTION, SOLUTION INTRAVENOUS at 12:08

## 2023-08-24 ASSESSMENT — PULMONARY FUNCTION TESTS
FEV1_PERCENT_PREDICTED_PRE: 90
FVC_PERCENT_PREDICTED_POST: 76
FVC_PERCENT_PREDICTED_PRE: 75
FEV1_PERCENT_PREDICTED_POST: 96

## 2023-08-28 PROBLEM — J61 ASBESTOSIS (HCC): Status: ACTIVE | Noted: 2023-08-28

## 2023-08-29 ENCOUNTER — APPOINTMENT (OUTPATIENT)
Dept: PHYSICAL THERAPY | Age: 83
End: 2023-08-29
Payer: MEDICARE

## 2023-08-30 ENCOUNTER — HOSPITAL ENCOUNTER (OUTPATIENT)
Dept: CARDIAC CATH/INVASIVE PROCEDURES | Age: 83
Discharge: HOME | End: 2023-08-30
Payer: MEDICARE

## 2023-08-30 VITALS
BODY MASS INDEX: 29.51 KG/M2 | OXYGEN SATURATION: 98 % | HEART RATE: 75 BPM | SYSTOLIC BLOOD PRESSURE: 168 MMHG | TEMPERATURE: 97.7 F | WEIGHT: 188 LBS | DIASTOLIC BLOOD PRESSURE: 82 MMHG | HEIGHT: 67 IN | RESPIRATION RATE: 16 BRPM

## 2023-08-30 LAB
EKG ATRIAL RATE: 100 BPM
EKG DIAGNOSIS: NORMAL
EKG P AXIS: 54 DEGREES
EKG P-R INTERVAL: 144 MS
EKG Q-T INTERVAL: 380 MS
EKG QRS DURATION: 80 MS
EKG QTC CALCULATION (BAZETT): 490 MS
EKG R AXIS: -5 DEGREES
EKG T AXIS: 104 DEGREES
EKG VENTRICULAR RATE: 100 BPM

## 2023-08-30 PROCEDURE — C1894 INTRO/SHEATH, NON-LASER: HCPCS

## 2023-08-30 PROCEDURE — 93005 ELECTROCARDIOGRAM TRACING: CPT | Performed by: INTERNAL MEDICINE

## 2023-08-30 PROCEDURE — 93010 ELECTROCARDIOGRAM REPORT: CPT | Performed by: INTERNAL MEDICINE

## 2023-08-30 PROCEDURE — 99153 MOD SED SAME PHYS/QHP EA: CPT

## 2023-08-30 PROCEDURE — 93454 CORONARY ARTERY ANGIO S&I: CPT | Performed by: INTERNAL MEDICINE

## 2023-08-30 PROCEDURE — 93454 CORONARY ARTERY ANGIO S&I: CPT

## 2023-08-30 PROCEDURE — 2580000003 HC RX 258

## 2023-08-30 PROCEDURE — 6360000002 HC RX W HCPCS

## 2023-08-30 PROCEDURE — 6360000004 HC RX CONTRAST MEDICATION: Performed by: INTERNAL MEDICINE

## 2023-08-30 PROCEDURE — 6370000000 HC RX 637 (ALT 250 FOR IP)

## 2023-08-30 PROCEDURE — 2500000003 HC RX 250 WO HCPCS

## 2023-08-30 PROCEDURE — 2709999900 HC NON-CHARGEABLE SUPPLY

## 2023-08-30 PROCEDURE — C1769 GUIDE WIRE: HCPCS

## 2023-08-30 PROCEDURE — 99152 MOD SED SAME PHYS/QHP 5/>YRS: CPT

## 2023-08-30 PROCEDURE — C1760 CLOSURE DEV, VASC: HCPCS

## 2023-08-30 PROCEDURE — 75625 CONTRAST EXAM ABDOMINL AORTA: CPT

## 2023-08-30 RX ORDER — SODIUM CHLORIDE 9 MG/ML
INJECTION, SOLUTION INTRAVENOUS PRN
Status: DISCONTINUED | OUTPATIENT
Start: 2023-08-30 | End: 2023-08-31 | Stop reason: HOSPADM

## 2023-08-30 RX ORDER — SODIUM CHLORIDE 0.9 % (FLUSH) 0.9 %
5-40 SYRINGE (ML) INJECTION EVERY 12 HOURS SCHEDULED
Status: DISCONTINUED | OUTPATIENT
Start: 2023-08-30 | End: 2023-08-31 | Stop reason: HOSPADM

## 2023-08-30 RX ORDER — SODIUM CHLORIDE 0.9 % (FLUSH) 0.9 %
5-40 SYRINGE (ML) INJECTION PRN
Status: DISCONTINUED | OUTPATIENT
Start: 2023-08-30 | End: 2023-08-31 | Stop reason: HOSPADM

## 2023-08-30 RX ORDER — ACETAMINOPHEN 325 MG/1
650 TABLET ORAL EVERY 4 HOURS PRN
Status: DISCONTINUED | OUTPATIENT
Start: 2023-08-30 | End: 2023-08-31 | Stop reason: HOSPADM

## 2023-08-30 RX ORDER — DIPHENHYDRAMINE HYDROCHLORIDE 50 MG/ML
25 INJECTION INTRAMUSCULAR; INTRAVENOUS ONCE
Status: DISCONTINUED | OUTPATIENT
Start: 2023-08-30 | End: 2023-08-31 | Stop reason: HOSPADM

## 2023-08-30 RX ORDER — ONDANSETRON 2 MG/ML
4 INJECTION INTRAMUSCULAR; INTRAVENOUS EVERY 6 HOURS PRN
Status: DISCONTINUED | OUTPATIENT
Start: 2023-08-30 | End: 2023-08-31 | Stop reason: HOSPADM

## 2023-08-30 RX ADMIN — IOPAMIDOL 80 ML: 755 INJECTION, SOLUTION INTRAVENOUS at 10:04

## 2023-08-30 NOTE — DISCHARGE INSTRUCTIONS
CARDIAC ANGIOGRAM (LEFT HEART CATH) - RADIAL ACCESS    Care of your puncture site:  Remove clear bandage 24 hours after the procedure. 2023  11:00AM  May shower in 24 hours  Inspect the site daily and gently clean using soap and water. Dry thoroughly and apply a Band-Aid. Normal Observations:  Soreness or tenderness which may last one week. Mild oozing from the incision site. Possible bruising that could last 2 weeks. Activity:  You may resume driving 24 hours following the procedure. Avoid lifting more than 10 pounds for 3 days with affected arm. Do not make important / legal decisions within 24 hours after procedure. Nutrition:  Regular diet or previous diet. Drink at least 8 to 10 glasses of decaffeinated, non-alcoholic fluid for the next 24 hours to flush the x-ray dye used for your angiogram out of your body. Call your doctor immediately if your condition worsens, for any other concerns, for a follow-up appointment or if you experience any of the followin [id number]  Significant bleeding that does not stop after 10 minutes of applying firm pressure on the puncture site. Increased swelling of the wrist.  Unusual pain, numbness, or tingling of the wrist/arm. Any signs of infection such as: redness, yellow drainage at the site, swelling or pain. CARDIAC ANGIOGRAM (LEFT HEART CATH) - [place] of your puncture site:  Remove bandage 24 hours after the procedure. 2023 11:00AM  May shower in 24 hours but do not sit in a bathtub/pool of water for 5 days or until the wound is healed. Gently clean groin using soap and water. Dry thoroughly and apply a Band-Aid that covers the entire site. Use Band-Aid until skin heals over in about 3-5 days. Do not apply powder or lotion. Normal Observations:  Soreness or tenderness which may last one week. Mild oozing from the incision site. Possible bruising that could last 2 weeks.     Activity:  You may resume driving

## 2023-08-30 NOTE — H&P
Reason for Referral: Aortic stenosis     Referring physician: Dr Lanis Boas     CC: \"I am here to discuss my echocardiogram.\"        Subjective:      History of Present Illness:     Rusty Vasquez is a 82 y.o. patient with a PMH significant for aortic stenosis,hypertension, and hyperlipidemia. He had an echocardiogram which showed severe aortic stenosis and a referral was placed for evaluation of TAVR. Today, he is here to discuss TAVR. He has noticed that he has had increased dizziness with mowing his grass and shortness of breath. Patient denies exertional chest pain/pressure, dyspnea at rest, PND, orthopnea, palpitations, weight changes, changes in LE edema, and syncope. 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 Surgical History:   has a past surgical history that includes colectomy; bladder repair (7/23/1998); Colonoscopy; Tonsillectomy (1947); tumor removal (1970); Skin cancer excision (9/30/2013); hernia repair (1969, 1999, 2005); and Shoulder arthroscopy (Right, 4/12/2023). Social History:   reports that he quit smoking about 46 years ago. His smoking use included cigarettes. He has a 18.00 pack-year smoking history. He has never used smokeless tobacco. He reports current alcohol use of about 3.0 - 4.0 standard drinks per week. He reports that he does not use drugs. Family History:  family history includes Cancer (age of onset: 68) in his sister; Cancer (age of onset: 76) in his sister;  Heart Failure in his brother; Heart Failure

## 2023-08-31 ENCOUNTER — TELEPHONE (OUTPATIENT)
Dept: PULMONOLOGY | Age: 83
End: 2023-08-31

## 2023-08-31 NOTE — TELEPHONE ENCOUNTER
Pt wants to know if results for pulmonary test done on 08/24/23 are back. Pt will need a letter from Dr. Elena Avendaño regarding asbestos exposure and results.

## 2023-09-01 NOTE — TELEPHONE ENCOUNTER
I sent him a mychart result regarding the test results (not a significant change from the last CT). I will work on the letter which will probably be finished next week.

## 2023-09-06 ENCOUNTER — OFFICE VISIT (OUTPATIENT)
Dept: ORTHOPEDIC SURGERY | Age: 83
End: 2023-09-06
Payer: MEDICARE

## 2023-09-06 ENCOUNTER — TELEPHONE (OUTPATIENT)
Dept: ORTHOPEDIC SURGERY | Age: 83
End: 2023-09-06

## 2023-09-06 VITALS — WEIGHT: 190 LBS | HEIGHT: 67 IN | BODY MASS INDEX: 29.82 KG/M2

## 2023-09-06 DIAGNOSIS — M19.012 ARTHRITIS OF LEFT ACROMIOCLAVICULAR JOINT: ICD-10-CM

## 2023-09-06 DIAGNOSIS — M75.80 ROTATOR CUFF TENDINITIS, UNSPECIFIED LATERALITY: Primary | ICD-10-CM

## 2023-09-06 PROCEDURE — 1124F ACP DISCUSS-NO DSCNMKR DOCD: CPT | Performed by: ORTHOPAEDIC SURGERY

## 2023-09-06 PROCEDURE — 99213 OFFICE O/P EST LOW 20 MIN: CPT | Performed by: ORTHOPAEDIC SURGERY

## 2023-09-06 RX ORDER — IBUPROFEN 200 MG
200 TABLET ORAL EVERY 6 HOURS PRN
COMMUNITY

## 2023-09-06 NOTE — TELEPHONE ENCOUNTER
Same Day Appt Add On Time     Appointment time:  10:30AM   WEST/ PATIENT WILL BE THERE AT 10:15AM NO LATER

## 2023-09-07 RX ORDER — HYDROCORTISONE BUTYRATE 1 MG/G
OINTMENT TOPICAL
OUTPATIENT
Start: 2023-09-07

## 2023-09-11 ENCOUNTER — TELEPHONE (OUTPATIENT)
Dept: CARDIOLOGY | Age: 83
End: 2023-09-11

## 2023-09-11 RX ORDER — HYDROCORTISONE BUTYRATE 1 MG/G
OINTMENT TOPICAL
Qty: 45 G | Refills: 0 | Status: SHIPPED | OUTPATIENT
Start: 2023-09-11

## 2023-09-13 SDOH — HEALTH STABILITY: PHYSICAL HEALTH: ON AVERAGE, HOW MANY MINUTES DO YOU ENGAGE IN EXERCISE AT THIS LEVEL?: 20 MIN

## 2023-09-13 SDOH — HEALTH STABILITY: PHYSICAL HEALTH: ON AVERAGE, HOW MANY DAYS PER WEEK DO YOU ENGAGE IN MODERATE TO STRENUOUS EXERCISE (LIKE A BRISK WALK)?: 2 DAYS

## 2023-09-13 ASSESSMENT — LIFESTYLE VARIABLES
HOW OFTEN DO YOU HAVE SIX OR MORE DRINKS ON ONE OCCASION: 1
HOW MANY STANDARD DRINKS CONTAINING ALCOHOL DO YOU HAVE ON A TYPICAL DAY: 1 OR 2
HOW OFTEN DO YOU HAVE A DRINK CONTAINING ALCOHOL: 4
HOW OFTEN DO YOU HAVE A DRINK CONTAINING ALCOHOL: 2-3 TIMES A WEEK
HOW MANY STANDARD DRINKS CONTAINING ALCOHOL DO YOU HAVE ON A TYPICAL DAY: 1

## 2023-09-15 DIAGNOSIS — R73.03 PREDIABETES: ICD-10-CM

## 2023-09-15 DIAGNOSIS — I10 ESSENTIAL HYPERTENSION, BENIGN: ICD-10-CM

## 2023-09-15 LAB
ALBUMIN SERPL-MCNC: 4.4 G/DL (ref 3.4–5)
ALBUMIN/GLOB SERPL: 1.8 {RATIO} (ref 1.1–2.2)
ALP SERPL-CCNC: 108 U/L (ref 40–129)
ALT SERPL-CCNC: 16 U/L (ref 10–40)
ANION GAP SERPL CALCULATED.3IONS-SCNC: 13 MMOL/L (ref 3–16)
AST SERPL-CCNC: 18 U/L (ref 15–37)
BILIRUB SERPL-MCNC: 1 MG/DL (ref 0–1)
BUN SERPL-MCNC: 23 MG/DL (ref 7–20)
CALCIUM SERPL-MCNC: 9.4 MG/DL (ref 8.3–10.6)
CHLORIDE SERPL-SCNC: 103 MMOL/L (ref 99–110)
CO2 SERPL-SCNC: 24 MMOL/L (ref 21–32)
CREAT SERPL-MCNC: 1.2 MG/DL (ref 0.8–1.3)
GFR SERPLBLD CREATININE-BSD FMLA CKD-EPI: >60 ML/MIN/{1.73_M2}
GLUCOSE SERPL-MCNC: 84 MG/DL (ref 70–99)
POTASSIUM SERPL-SCNC: 3.9 MMOL/L (ref 3.5–5.1)
PROT SERPL-MCNC: 6.9 G/DL (ref 6.4–8.2)
SODIUM SERPL-SCNC: 140 MMOL/L (ref 136–145)

## 2023-09-16 LAB
EST. AVERAGE GLUCOSE BLD GHB EST-MCNC: 108.3 MG/DL
HBA1C MFR BLD: 5.4 %

## 2023-09-20 ENCOUNTER — HOSPITAL ENCOUNTER (OUTPATIENT)
Dept: PREADMISSION TESTING | Age: 83
Discharge: HOME | End: 2023-09-20
Payer: MEDICARE

## 2023-09-20 ENCOUNTER — ANESTHESIA EVENT (OUTPATIENT)
Dept: OPERATING ROOM | Age: 83
DRG: 267 | End: 2023-09-20
Payer: MEDICARE

## 2023-09-20 VITALS
WEIGHT: 184 LBS | HEIGHT: 67 IN | SYSTOLIC BLOOD PRESSURE: 147 MMHG | OXYGEN SATURATION: 100 % | DIASTOLIC BLOOD PRESSURE: 66 MMHG | HEART RATE: 44 BPM | BODY MASS INDEX: 28.88 KG/M2 | TEMPERATURE: 98 F

## 2023-09-20 LAB
ABO + RH BLD: NORMAL
BASOPHILS # BLD: 0.1 K/UL (ref 0–0.2)
BASOPHILS NFR BLD: 1.3 %
BILIRUB UR QL STRIP.AUTO: NEGATIVE
BLD GP AB SCN SERPL QL: NORMAL
CLARITY UR: CLEAR
COLOR UR: YELLOW
DEPRECATED RDW RBC AUTO: 17.3 % (ref 12.4–15.4)
EOSINOPHIL # BLD: 0.3 K/UL (ref 0–0.6)
EOSINOPHIL NFR BLD: 3.7 %
GLUCOSE UR STRIP.AUTO-MCNC: NEGATIVE MG/DL
HCT VFR BLD AUTO: 42.1 % (ref 40.5–52.5)
HGB BLD-MCNC: 13.4 G/DL (ref 13.5–17.5)
HGB UR QL STRIP.AUTO: NEGATIVE
INR PPP: 1.04 (ref 0.84–1.16)
KETONES UR STRIP.AUTO-MCNC: ABNORMAL MG/DL
LEUKOCYTE ESTERASE UR QL STRIP.AUTO: NEGATIVE
LYMPHOCYTES # BLD: 1.1 K/UL (ref 1–5.1)
LYMPHOCYTES NFR BLD: 14.5 %
MAGNESIUM SERPL-MCNC: 2.2 MG/DL (ref 1.8–2.4)
MCH RBC QN AUTO: 28.7 PG (ref 26–34)
MCHC RBC AUTO-ENTMCNC: 32 G/DL (ref 31–36)
MCV RBC AUTO: 89.7 FL (ref 80–100)
MONOCYTES # BLD: 0.5 K/UL (ref 0–1.3)
MONOCYTES NFR BLD: 6.9 %
NEUTROPHILS # BLD: 5.8 K/UL (ref 1.7–7.7)
NEUTROPHILS NFR BLD: 73.6 %
NITRITE UR QL STRIP.AUTO: NEGATIVE
PH UR STRIP.AUTO: 5.5 [PH] (ref 5–8)
PLATELET # BLD AUTO: 241 K/UL (ref 135–450)
PMV BLD AUTO: 9.6 FL (ref 5–10.5)
PROT UR STRIP.AUTO-MCNC: NEGATIVE MG/DL
PROTHROMBIN TIME: 13.6 SEC (ref 11.5–14.8)
RBC # BLD AUTO: 4.69 M/UL (ref 4.2–5.9)
SP GR UR STRIP.AUTO: 1.02 (ref 1–1.03)
UA DIPSTICK W REFLEX MICRO PNL UR: ABNORMAL
URN SPEC COLLECT METH UR: ABNORMAL
UROBILINOGEN UR STRIP-ACNC: 0.2 E.U./DL
WBC # BLD AUTO: 7.8 K/UL (ref 4–11)

## 2023-09-20 PROCEDURE — 83735 ASSAY OF MAGNESIUM: CPT

## 2023-09-20 PROCEDURE — 87086 URINE CULTURE/COLONY COUNT: CPT

## 2023-09-20 PROCEDURE — 86850 RBC ANTIBODY SCREEN: CPT

## 2023-09-20 PROCEDURE — 85025 COMPLETE CBC W/AUTO DIFF WBC: CPT

## 2023-09-20 PROCEDURE — 86900 BLOOD TYPING SEROLOGIC ABO: CPT

## 2023-09-20 PROCEDURE — 86901 BLOOD TYPING SEROLOGIC RH(D): CPT

## 2023-09-20 PROCEDURE — 81003 URINALYSIS AUTO W/O SCOPE: CPT

## 2023-09-20 PROCEDURE — 36415 COLL VENOUS BLD VENIPUNCTURE: CPT

## 2023-09-20 PROCEDURE — 85610 PROTHROMBIN TIME: CPT

## 2023-09-20 ASSESSMENT — PAIN SCALES - GENERAL: PAINLEVEL_OUTOF10: 0

## 2023-09-20 ASSESSMENT — ENCOUNTER SYMPTOMS: SHORTNESS OF BREATH: 0

## 2023-09-20 ASSESSMENT — LIFESTYLE VARIABLES: SMOKING_STATUS: 0

## 2023-09-20 NOTE — PROGRESS NOTES
Pt here for PAT visit. Consents for procedure and blood signed by pt and in chart. Labs drawn and urine collected and sent to lab for testing as ordered. Pt seen by Dr. Viviana Nguyen from Anesthesia and questions answered. Vitals stable and documented in flowsheet. Pt instructed to be NPO after midnight prior to procedure and states understanding. Pt provided with hibiclens and instructed to shower with entire bottle the night prior to procedure. Pt instructed to take the following medications the morning of procedure with a small sip of water: N/A. Pt instructed to stop taking Aleve prior to procedure on 9/20/23 and states understanding. EKG completed and results in chart. Pt instructed to arrive to the hospital the morning of procedure at 0830 on 9/26/23. Pt in good condition and okay for discharge . Pt denies further questions at time of discharge. Pt instructed to call Dr. Omer Dhillon office with any medical concerns or the heart office at [phone] with any further questions between now and day of procedure.

## 2023-09-21 ENCOUNTER — OFFICE VISIT (OUTPATIENT)
Dept: FAMILY MEDICINE CLINIC | Age: 83
End: 2023-09-21
Payer: MEDICARE

## 2023-09-21 VITALS
SYSTOLIC BLOOD PRESSURE: 128 MMHG | BODY MASS INDEX: 29.38 KG/M2 | TEMPERATURE: 97.9 F | HEIGHT: 67 IN | HEART RATE: 50 BPM | WEIGHT: 187.2 LBS | OXYGEN SATURATION: 98 % | DIASTOLIC BLOOD PRESSURE: 54 MMHG

## 2023-09-21 DIAGNOSIS — Z00.00 INITIAL MEDICARE ANNUAL WELLNESS VISIT: Primary | ICD-10-CM

## 2023-09-21 LAB — BACTERIA UR CULT: NORMAL

## 2023-09-21 PROCEDURE — 3074F SYST BP LT 130 MM HG: CPT | Performed by: NURSE PRACTITIONER

## 2023-09-21 PROCEDURE — 3078F DIAST BP <80 MM HG: CPT | Performed by: NURSE PRACTITIONER

## 2023-09-21 PROCEDURE — G0402 INITIAL PREVENTIVE EXAM: HCPCS | Performed by: NURSE PRACTITIONER

## 2023-09-21 PROCEDURE — 1123F ACP DISCUSS/DSCN MKR DOCD: CPT | Performed by: NURSE PRACTITIONER

## 2023-09-26 ENCOUNTER — ANESTHESIA (OUTPATIENT)
Dept: OPERATING ROOM | Age: 83
DRG: 267 | End: 2023-09-26
Payer: MEDICARE

## 2023-09-26 ENCOUNTER — TELEPHONE (OUTPATIENT)
Dept: CARDIOLOGY CLINIC | Age: 83
End: 2023-09-26

## 2023-09-26 ENCOUNTER — HOSPITAL ENCOUNTER (INPATIENT)
Age: 83
LOS: 1 days | Discharge: HOME | DRG: 267 | End: 2023-09-27
Attending: INTERNAL MEDICINE | Admitting: INTERNAL MEDICINE
Payer: MEDICARE

## 2023-09-26 DIAGNOSIS — Z95.2 S/P TAVR (TRANSCATHETER AORTIC VALVE REPLACEMENT): Primary | ICD-10-CM

## 2023-09-26 PROBLEM — I35.0 AORTIC STENOSIS, SEVERE: Status: ACTIVE | Noted: 2023-09-26

## 2023-09-26 LAB
ABO + RH BLD: NORMAL
ACTIVATED CLOTTING TIME: 109 SEC (ref 99–130)
ACTIVATED CLOTTING TIME: 116 SEC (ref 99–130)
ACTIVATED CLOTTING TIME: 242 SEC (ref 99–130)
ACTIVATED CLOTTING TIME: 307 SEC (ref 99–130)
BASE EXCESS BLDA CALC-SCNC: 0 MMOL/L (ref -3–3)
BLD GP AB SCN SERPL QL: NORMAL
BLOOD BANK DISPENSE STATUS: NORMAL
BLOOD BANK PRODUCT CODE: NORMAL
BPU ID: NORMAL
CA-I BLD-SCNC: 1.24 MMOL/L (ref 1.12–1.32)
CO2 BLDA-SCNC: 29 MMOL/L
DESCRIPTION BLOOD BANK: NORMAL
EKG ATRIAL RATE: 89 BPM
EKG DIAGNOSIS: NORMAL
EKG P AXIS: 55 DEGREES
EKG P-R INTERVAL: 144 MS
EKG Q-T INTERVAL: 410 MS
EKG QRS DURATION: 92 MS
EKG QTC CALCULATION (BAZETT): 498 MS
EKG R AXIS: 4 DEGREES
EKG T AXIS: 237 DEGREES
EKG VENTRICULAR RATE: 89 BPM
GLUCOSE BLD-MCNC: 101 MG/DL (ref 70–99)
HCO3 BLDA-SCNC: 26.9 MMOL/L (ref 21–29)
HCT VFR BLD AUTO: 39 % (ref 40.5–52.5)
HGB BLD CALC-MCNC: 13.1 GM/DL (ref 13.5–17.5)
LACTATE BLD-SCNC: 0.97 MMOL/L (ref 0.4–2)
NT-PROBNP SERPL-MCNC: 3333 PG/ML (ref 0–449)
PCO2 BLDA: 58.9 MM HG (ref 35–45)
PERFORMED ON: ABNORMAL
PH BLDA: 7.27 [PH] (ref 7.35–7.45)
PO2 BLDA: 99.6 MM HG (ref 75–108)
POC SAMPLE TYPE: ABNORMAL
POTASSIUM BLD-SCNC: 3.5 MMOL/L (ref 3.5–5.1)
SAO2 % BLDA: 97 % (ref 93–100)
SODIUM BLD-SCNC: 140 MMOL/L (ref 136–145)
TROPONIN, HIGH SENSITIVITY: 44 NG/L (ref 0–22)

## 2023-09-26 PROCEDURE — 3700000000 HC ANESTHESIA ATTENDED CARE: Performed by: INTERNAL MEDICINE

## 2023-09-26 PROCEDURE — 6360000004 HC RX CONTRAST MEDICATION: Performed by: INTERNAL MEDICINE

## 2023-09-26 PROCEDURE — 2709999900 HC NON-CHARGEABLE SUPPLY

## 2023-09-26 PROCEDURE — 02RF38Z REPLACEMENT OF AORTIC VALVE WITH ZOOPLASTIC TISSUE, PERCUTANEOUS APPROACH: ICD-10-PCS | Performed by: INTERNAL MEDICINE

## 2023-09-26 PROCEDURE — 3700000001 HC ADD 15 MINUTES (ANESTHESIA): Performed by: INTERNAL MEDICINE

## 2023-09-26 PROCEDURE — 3600000007 HC SURGERY HYBRID BASE

## 2023-09-26 PROCEDURE — 6360000002 HC RX W HCPCS: Performed by: INTERNAL MEDICINE

## 2023-09-26 PROCEDURE — C1894 INTRO/SHEATH, NON-LASER: HCPCS

## 2023-09-26 PROCEDURE — 33361 REPLACE AORTIC VALVE PERQ: CPT | Performed by: STUDENT IN AN ORGANIZED HEALTH CARE EDUCATION/TRAINING PROGRAM

## 2023-09-26 PROCEDURE — 86900 BLOOD TYPING SEROLOGIC ABO: CPT

## 2023-09-26 PROCEDURE — 6360000002 HC RX W HCPCS: Performed by: ANESTHESIOLOGY

## 2023-09-26 PROCEDURE — 2060000000 HC ICU INTERMEDIATE R&B

## 2023-09-26 PROCEDURE — C1760 CLOSURE DEV, VASC: HCPCS

## 2023-09-26 PROCEDURE — 82803 BLOOD GASES ANY COMBINATION: CPT

## 2023-09-26 PROCEDURE — C1730 CATH, EP, 19 OR FEW ELECT: HCPCS

## 2023-09-26 PROCEDURE — 83605 ASSAY OF LACTIC ACID: CPT

## 2023-09-26 PROCEDURE — 84132 ASSAY OF SERUM POTASSIUM: CPT

## 2023-09-26 PROCEDURE — 6370000000 HC RX 637 (ALT 250 FOR IP): Performed by: INTERNAL MEDICINE

## 2023-09-26 PROCEDURE — 83880 ASSAY OF NATRIURETIC PEPTIDE: CPT

## 2023-09-26 PROCEDURE — 93005 ELECTROCARDIOGRAM TRACING: CPT | Performed by: INTERNAL MEDICINE

## 2023-09-26 PROCEDURE — 84295 ASSAY OF SERUM SODIUM: CPT

## 2023-09-26 PROCEDURE — 86923 COMPATIBILITY TEST ELECTRIC: CPT

## 2023-09-26 PROCEDURE — 7100000010 HC PHASE II RECOVERY - FIRST 15 MIN

## 2023-09-26 PROCEDURE — 3600000017 HC SURGERY HYBRID ADDL 15MIN

## 2023-09-26 PROCEDURE — 94761 N-INVAS EAR/PLS OXIMETRY MLT: CPT

## 2023-09-26 PROCEDURE — 85014 HEMATOCRIT: CPT

## 2023-09-26 PROCEDURE — C1769 GUIDE WIRE: HCPCS

## 2023-09-26 PROCEDURE — 2580000003 HC RX 258: Performed by: INTERNAL MEDICINE

## 2023-09-26 PROCEDURE — 86901 BLOOD TYPING SEROLOGIC RH(D): CPT

## 2023-09-26 PROCEDURE — 93010 ELECTROCARDIOGRAM REPORT: CPT | Performed by: INTERNAL MEDICINE

## 2023-09-26 PROCEDURE — 84484 ASSAY OF TROPONIN QUANT: CPT

## 2023-09-26 PROCEDURE — 7100000011 HC PHASE II RECOVERY - ADDTL 15 MIN

## 2023-09-26 PROCEDURE — 85347 COAGULATION TIME ACTIVATED: CPT

## 2023-09-26 PROCEDURE — C1889 IMPLANT/INSERT DEVICE, NOC: HCPCS

## 2023-09-26 PROCEDURE — 82330 ASSAY OF CALCIUM: CPT

## 2023-09-26 PROCEDURE — 86850 RBC ANTIBODY SCREEN: CPT

## 2023-09-26 PROCEDURE — 2709999900 HC NON-CHARGEABLE SUPPLY: Performed by: INTERNAL MEDICINE

## 2023-09-26 PROCEDURE — 82947 ASSAY GLUCOSE BLOOD QUANT: CPT

## 2023-09-26 PROCEDURE — 36415 COLL VENOUS BLD VENIPUNCTURE: CPT

## 2023-09-26 PROCEDURE — 33361 REPLACE AORTIC VALVE PERQ: CPT | Performed by: INTERNAL MEDICINE

## 2023-09-26 RX ORDER — ACETAMINOPHEN 325 MG/1
650 TABLET ORAL EVERY 4 HOURS PRN
Status: DISCONTINUED | OUTPATIENT
Start: 2023-09-26 | End: 2023-09-27 | Stop reason: HOSPADM

## 2023-09-26 RX ORDER — LIDOCAINE 4 G/G
1 PATCH TOPICAL ONCE
Status: COMPLETED | OUTPATIENT
Start: 2023-09-26 | End: 2023-09-26

## 2023-09-26 RX ORDER — ASPIRIN 81 MG/1
81 TABLET ORAL DAILY
Status: DISCONTINUED | OUTPATIENT
Start: 2023-09-26 | End: 2023-09-27 | Stop reason: HOSPADM

## 2023-09-26 RX ORDER — DOCUSATE SODIUM 100 MG/1
100 CAPSULE, LIQUID FILLED ORAL DAILY
Status: DISCONTINUED | OUTPATIENT
Start: 2023-09-26 | End: 2023-09-27 | Stop reason: HOSPADM

## 2023-09-26 RX ORDER — SODIUM CHLORIDE 0.9 % (FLUSH) 0.9 %
5-40 SYRINGE (ML) INJECTION EVERY 12 HOURS SCHEDULED
Status: DISCONTINUED | OUTPATIENT
Start: 2023-09-26 | End: 2023-09-27 | Stop reason: HOSPADM

## 2023-09-26 RX ORDER — ONDANSETRON 2 MG/ML
4 INJECTION INTRAMUSCULAR; INTRAVENOUS EVERY 6 HOURS PRN
Status: DISCONTINUED | OUTPATIENT
Start: 2023-09-26 | End: 2023-09-27 | Stop reason: HOSPADM

## 2023-09-26 RX ORDER — FUROSEMIDE 10 MG/ML
20 INJECTION INTRAMUSCULAR; INTRAVENOUS ONCE
Status: COMPLETED | OUTPATIENT
Start: 2023-09-26 | End: 2023-09-26

## 2023-09-26 RX ORDER — TAMSULOSIN HYDROCHLORIDE 0.4 MG/1
0.4 CAPSULE ORAL DAILY
Status: DISCONTINUED | OUTPATIENT
Start: 2023-09-26 | End: 2023-09-27 | Stop reason: HOSPADM

## 2023-09-26 RX ORDER — LISINOPRIL 20 MG/1
40 TABLET ORAL DAILY
Status: DISCONTINUED | OUTPATIENT
Start: 2023-09-26 | End: 2023-09-27

## 2023-09-26 RX ORDER — ATROPINE SULFATE 0.4 MG/ML
0.5 INJECTION, SOLUTION INTRAVENOUS
Status: DISCONTINUED | OUTPATIENT
Start: 2023-09-26 | End: 2023-09-27 | Stop reason: HOSPADM

## 2023-09-26 RX ORDER — CLOPIDOGREL BISULFATE 75 MG/1
75 TABLET ORAL DAILY
Status: DISCONTINUED | OUTPATIENT
Start: 2023-09-26 | End: 2023-09-27 | Stop reason: HOSPADM

## 2023-09-26 RX ORDER — SODIUM CHLORIDE, SODIUM LACTATE, POTASSIUM CHLORIDE, CALCIUM CHLORIDE 600; 310; 30; 20 MG/100ML; MG/100ML; MG/100ML; MG/100ML
INJECTION, SOLUTION INTRAVENOUS ONCE
Status: COMPLETED | OUTPATIENT
Start: 2023-09-26 | End: 2023-09-27

## 2023-09-26 RX ORDER — HEPARIN SODIUM 1000 [USP'U]/ML
INJECTION, SOLUTION INTRAVENOUS; SUBCUTANEOUS PRN
Status: DISCONTINUED | OUTPATIENT
Start: 2023-09-26 | End: 2023-09-26 | Stop reason: SDUPTHER

## 2023-09-26 RX ORDER — ALLOPURINOL 300 MG/1
300 TABLET ORAL DAILY
Status: DISCONTINUED | OUTPATIENT
Start: 2023-09-26 | End: 2023-09-27 | Stop reason: HOSPADM

## 2023-09-26 RX ORDER — SODIUM CHLORIDE 0.9 % (FLUSH) 0.9 %
5-40 SYRINGE (ML) INJECTION PRN
Status: DISCONTINUED | OUTPATIENT
Start: 2023-09-26 | End: 2023-09-27 | Stop reason: HOSPADM

## 2023-09-26 RX ORDER — 0.9 % SODIUM CHLORIDE 0.9 %
500 INTRAVENOUS SOLUTION INTRAVENOUS PRN
Status: DISCONTINUED | OUTPATIENT
Start: 2023-09-26 | End: 2023-09-27 | Stop reason: HOSPADM

## 2023-09-26 RX ORDER — HYDRALAZINE HYDROCHLORIDE 20 MG/ML
10 INJECTION INTRAMUSCULAR; INTRAVENOUS EVERY 6 HOURS PRN
Status: DISCONTINUED | OUTPATIENT
Start: 2023-09-26 | End: 2023-09-27 | Stop reason: HOSPADM

## 2023-09-26 RX ORDER — NITROGLYCERIN 20 MG/100ML
5 INJECTION INTRAVENOUS CONTINUOUS
Status: DISCONTINUED | OUTPATIENT
Start: 2023-09-26 | End: 2023-09-27 | Stop reason: HOSPADM

## 2023-09-26 RX ORDER — PROPOFOL 10 MG/ML
INJECTION, EMULSION INTRAVENOUS CONTINUOUS PRN
Status: DISCONTINUED | OUTPATIENT
Start: 2023-09-26 | End: 2023-09-26 | Stop reason: SDUPTHER

## 2023-09-26 RX ORDER — SODIUM CHLORIDE 9 MG/ML
INJECTION, SOLUTION INTRAVENOUS PRN
Status: DISCONTINUED | OUTPATIENT
Start: 2023-09-26 | End: 2023-09-27 | Stop reason: HOSPADM

## 2023-09-26 RX ORDER — PROTAMINE SULFATE 10 MG/ML
INJECTION, SOLUTION INTRAVENOUS PRN
Status: DISCONTINUED | OUTPATIENT
Start: 2023-09-26 | End: 2023-09-26 | Stop reason: SDUPTHER

## 2023-09-26 RX ORDER — ATORVASTATIN CALCIUM 20 MG/1
20 TABLET, FILM COATED ORAL NIGHTLY
Status: DISCONTINUED | OUTPATIENT
Start: 2023-09-26 | End: 2023-09-27 | Stop reason: HOSPADM

## 2023-09-26 RX ORDER — EZETIMIBE AND SIMVASTATIN 10; 40 MG/1; MG/1
1 TABLET ORAL NIGHTLY
Status: DISCONTINUED | OUTPATIENT
Start: 2023-09-26 | End: 2023-09-26 | Stop reason: CLARIF

## 2023-09-26 RX ADMIN — TAMSULOSIN HYDROCHLORIDE 0.4 MG: 0.4 CAPSULE ORAL at 17:38

## 2023-09-26 RX ADMIN — HEPARIN SODIUM 9000 UNITS: 1000 INJECTION INTRAVENOUS; SUBCUTANEOUS at 10:57

## 2023-09-26 RX ADMIN — LISINOPRIL 40 MG: 20 TABLET ORAL at 18:26

## 2023-09-26 RX ADMIN — PROTAMINE SULFATE 120 MG: 10 INJECTION, SOLUTION INTRAVENOUS at 11:19

## 2023-09-26 RX ADMIN — IOPAMIDOL 71 ML: 755 INJECTION, SOLUTION INTRAVENOUS at 11:27

## 2023-09-26 RX ADMIN — PROPOFOL 100 MCG/KG/MIN: 10 INJECTION, EMULSION INTRAVENOUS at 10:18

## 2023-09-26 RX ADMIN — CEFAZOLIN 2000 MG: 2 INJECTION, POWDER, FOR SOLUTION INTRAMUSCULAR; INTRAVENOUS at 10:18

## 2023-09-26 RX ADMIN — HEPARIN SODIUM 3000 UNITS: 1000 INJECTION INTRAVENOUS; SUBCUTANEOUS at 11:03

## 2023-09-26 RX ADMIN — FUROSEMIDE 20 MG: 10 INJECTION, SOLUTION INTRAMUSCULAR; INTRAVENOUS at 17:37

## 2023-09-26 RX ADMIN — ASPIRIN 81 MG: 81 TABLET, COATED ORAL at 17:37

## 2023-09-26 RX ADMIN — ATORVASTATIN CALCIUM 20 MG: 20 TABLET, FILM COATED ORAL at 20:26

## 2023-09-26 RX ADMIN — SODIUM CHLORIDE, POTASSIUM CHLORIDE, SODIUM LACTATE AND CALCIUM CHLORIDE: 600; 310; 30; 20 INJECTION, SOLUTION INTRAVENOUS at 09:00

## 2023-09-26 RX ADMIN — DOCUSATE SODIUM 100 MG: 100 CAPSULE, LIQUID FILLED ORAL at 17:38

## 2023-09-26 RX ADMIN — CLOPIDOGREL BISULFATE 75 MG: 75 TABLET ORAL at 17:37

## 2023-09-26 RX ADMIN — SODIUM CHLORIDE, PRESERVATIVE FREE 10 ML: 5 INJECTION INTRAVENOUS at 20:26

## 2023-09-26 RX ADMIN — ALLOPURINOL 300 MG: 300 TABLET ORAL at 17:38

## 2023-09-26 ASSESSMENT — PAIN SCALES - GENERAL
PAINLEVEL_OUTOF10: 0
PAINLEVEL_OUTOF10: 0

## 2023-09-26 ASSESSMENT — LIFESTYLE VARIABLES: SMOKING_STATUS: 0

## 2023-09-26 ASSESSMENT — ENCOUNTER SYMPTOMS: SHORTNESS OF BREATH: 0

## 2023-09-26 NOTE — ANESTHESIA PRE PROCEDURE
K 3.9 09/15/2023 07:47 AM     09/15/2023 07:47 AM    CO2 24 09/15/2023 07:47 AM    BUN 23 09/15/2023 07:47 AM    CREATININE 1.2 09/15/2023 07:47 AM    GFRAA >60 09/01/2022 09:04 AM    AGRATIO 1.8 09/15/2023 07:47 AM    LABGLOM >60 09/15/2023 07:47 AM    GLUCOSE 84 09/15/2023 07:47 AM    PROT 6.9 09/15/2023 07:47 AM    CALCIUM 9.4 09/15/2023 07:47 AM    BILITOT 1.0 09/15/2023 07:47 AM    ALKPHOS 108 09/15/2023 07:47 AM    AST 18 09/15/2023 07:47 AM    ALT 16 09/15/2023 07:47 AM       POC Tests: No results for input(s): \"POCGLU\", \"POCNA\", \"POCK\", \"POCCL\", \"POCBUN\", \"POCHEMO\", \"POCHCT\" in the last 72 hours. Coags:   Lab Results   Component Value Date/Time    PROTIME 13.6 09/20/2023 09:40 AM    INR 1.04 09/20/2023 09:40 AM       HCG (If Applicable): No results found for: \"PREGTESTUR\", \"PREGSERUM\", \"HCG\", \"HCGQUANT\"     ABGs: No results found for: \"PHART\", \"PO2ART\", \"ONR5XUA\", \"UMP3BJN\", \"BEART\", \"D5NOFKMS\"     Type & Screen (If Applicable):  No results found for: \"LABABO\", \"LABRH\"    Drug/Infectious Status (If Applicable):  No results found for: \"HIV\", \"HEPCAB\"    COVID-19 Screening (If Applicable):   Lab Results   Component Value Date/Time    COVID19 NOT DETECTED 08/05/2020 08:46 AM           Anesthesia Evaluation  Patient summary reviewed and Nursing notes reviewed no history of anesthetic complications:   Airway: Mallampati: III  TM distance: >3 FB   Neck ROM: full  Mouth opening: > = 3 FB   Dental: normal exam         Pulmonary: breath sounds clear to auscultation      (-) COPD, asthma, shortness of breath, recent URI and not a current smoker                           Cardiovascular:  Exercise tolerance: poor (<4 METS),   (+) hypertension:, valvular problems/murmurs: AS, murmur, hyperlipidemia        Rhythm: regular  Rate: normal  Echocardiogram reviewed    Cleared by cardiology           ROS comment:  Summary   Patient appears to be in a bigeminal rhythm.    Left ventricular cavity size is normal.   There
ventricular cavity size is normal.   There is mildly increased left ventricular wall thickness. Overall left ventricular systolic function appears normal.   Ejection fraction is visually estimated to be 60-65%. No regional wall motion abnormalities are noted. Indeterminate diastolic function. The right ventricle is borderline dilated with normal function. The left atrium is moderately dilated. Moderate mitral regurgitation. Aortic valve leaflets are severely calcified with significantly restricted   leaflet mobility. Severe aortic stenosis with a peak velocity of 5.20m/s and a peak/mean   pressure gradient of 108mmHg/71mmHg. Moderate aortic regurgitation. Mild to moderate tricuspid regurgitation. Neuro/Psych:   Negative Neuro/Psych ROS              GI/Hepatic/Renal:        (-) GERD       Endo/Other:        (-) diabetes mellitus, hypothyroidism, hyperthyroidism               Abdominal:         (-) obese       Vascular: negative vascular ROS. Other Findings:           Anesthesia Plan      MAC     ASA 4       Induction: intravenous.                             Alex Funk II, MD   9/20/2023

## 2023-09-26 NOTE — PROGRESS NOTES
Pt. Received from procedure room in stable condition. Pt alert and oriented, RR easy and unlabored, vss. Pt. Provided with drink. 5FR sheath in right femoral artery. 6fr venous sheath in right femoral vein. Left groin site WNL. EKG obtained. Will continue to monitor.

## 2023-09-26 NOTE — PROGRESS NOTES
Pt. Sheath removed from Right  Femoral  Vein. Manual pressure held for 10 minutes. No bleeding, hematoma, or other complications noted. Quick clot and dressing applied. Pulses unchanged.

## 2023-09-26 NOTE — ANESTHESIA POSTPROCEDURE EVALUATION
Department of Anesthesiology  Postprocedure Note    Patient: Blanco Medeiros  MRN: [id number]  [address] [facility] [facility] 1940  Date of evaluation: 9/26/2023      Procedure Summary     Date: 09/26/23 Room / Location: [address] [place] [address]    Anesthesia Start: 1018 Anesthesia Stop:     Procedures:       TRANSCATHETER AORTIC VALVE REPLACEMENT FEMORAL APPROACH      TRANSCATHETER AORTIC VALVE REPLACEMENT FEMORAL APPROACH Diagnosis:       Aortic valve stenosis, etiology of cardiac valve disease unspecified      (Aortic valve stenosis, etiology of cardiac valve disease unspecified [I35.0])    Surgeons: Taz Cutler MD; Dev Elliott MD Responsible Provider: Matheus Sears MD    Anesthesia Type: MAC ASA Status: 4          Anesthesia Type: No value filed.     Ema Phase I: Ema Score: 10    Ema Phase II:        Anesthesia Post Evaluation    Patient location during evaluation: PACU  Patient participation: complete - patient participated  Level of consciousness: awake  Airway patency: patent  Nausea & Vomiting: no vomiting and no nausea  Complications: no  Cardiovascular status: hemodynamically stable  Respiratory status: acceptable  Hydration status: stable  Multimodal analgesia pain management approach  Pain management: adequate

## 2023-09-26 NOTE — H&P
auscultation bilaterally, respirations unlabored. No wheeze, rales   Chest Wall:  No tenderness or deformity. Cardiovascular:     Pulses  Palpation: normal   Ascultation: Regular rate, S1/ S2 normal. No murmur, rub, or gallop. 2+ radial and pedal pulses, symmetric  Carotid  Femoral   Abdomen and Gastrointestinal:   Soft, non-tender, bowel sounds active. Liver and Spleen  Masses   Musculoskeletal: No muscle wasting  Back  Gait   Extremities: Extremities normal, atraumatic. No cyanosis or edema. No cyanosis clubbing         Skin: Inspection and palpation performed, no rashes or lesions. Pysch: Normal mood and affect.  Alert and oriented to time place person   Neurologic: Normal gross motor and sensory exam.       Labs      All labs have been reviewed               Lab Results   Component Value Date/Time     WBC 8.5 2023 08:44 AM     RBC 4.76 2023 08:44 AM     HGB 14.7 2023 08:44 AM     HCT 45.1 2023 08:44 AM     MCV 94.7 2023 08:44 AM     RDW 15.1 2023 08:44 AM      2023 08:44 AM                Lab Results   Component Value Date/Time      2023 08:44 AM     K 4.5 2023 10:01 AM      2023 08:44 AM     CO2 28 2023 08:44 AM     BUN 31 2023 08:44 AM     CREATININE 1.1 2023 08:44 AM     GFRAA >60 2022 09:04 AM     AGRATIO 1.9 2022 09:04 AM     LABGLOM >60 2023 08:44 AM     GLUCOSE 80 2023 08:44 AM     PROT 6.6 2022 09:04 AM     CALCIUM 9.4 2023 08:44 AM     BILITOT 0.3 2022 09:04 AM     ALKPHOS 59 2022 09:04 AM     AST 11 2022 09:04 AM     ALT 11 2022 09:04 AM      No results found for: PTINR            Lab Results   Component Value Date     LABA1C 5.7 2023                Lab Results   Component Value Date     CKTOTAL 287 2021         Cardiac, Vascular and Imaging Data all Personally Reviewed in Detail by Myself       EK2023 2:1 block sinus

## 2023-09-26 NOTE — PROCEDURES
[facility]  TAVR Operative Note     PROCEDURE SUMMARY   Procedure Replacement of aortic valve with zooplastic tissue, percutaneous ([id number])   Valve Type Gill Kali 3 Ultra 26mm with 0additional cc of volume. Kris Dunn MD ()   Indication Nonrheumatic aortic valve stenosis (I35.0)   Consent Obtained, witnessed, documented in chart. EBL <84MS   Complications None   Specimens None   Bleed Risk Low   Sedation Sedation provided entirely by anesthesia. See record for details. TTE Performed preprocedure, intraprocedure and postprocedure. Access RCFA, LCFA, RCFV   Preclose Delivery side artery preclosed with 2 perclose devices   TVP Inserted via CFV into RV and threshold obtained and acceptable. Removed after valve implantation. Angiography Pigtail inserted through CFA into ascending aorta over wire   Delivery Sheath J wire advanced into descending Ao.  JR4 advanced over wire into descending Ao. Lunderquist wire advanced into descending Ao through JR4. Delivery sheath advanced over wire into descending aorta. AV Crossing AL1 and J/Straight wire combo used to cross AV. Amplatz extra stiff advanced through AL1 and curled in LV apex. BAV BAV not performed. Valve Deploy TAVR valve prepped by certified Frandy Juarez and advanced through the delivery sheath to the level of the aortic annulus. Valve localized under echo and fluoro guidance. Rapid pacing employed and valve deployed to profile for 4s. Balloon deflated and withdrawn into delivery sheath. Rapid pacing aborted. TTE reviewed for complications. Postdilation Post dilation was not performed   PostProcedure Wires removed and delivery catheter withdrawn. Delivery sheath removed and perclose sutures tied with hemostasis. Transitioned to postop/CVU.       CHF STATUS   Symptoms Onset: Onset: recent  Duration: weeks  Temporal: Worsening   CHF Type Chronic diastolic   NYHA II   [place] Major: Paroxysmal

## 2023-09-26 NOTE — PROGRESS NOTES
Pt returned to room 5914 after TAVR. Sites: right groin, left groin, pulses present. VSS. Bedrest complete at time 1730 with pt to ambulate afterwards. Vascular checks per protocol. Neuro check complete. SCDs on pt. Report from cath nurse. Telemetry verified.

## 2023-09-26 NOTE — OP NOTE
Operative Note      Patient: Meghan Hough  YOB: 1940  MRN: [id number]    Date of Procedure: 9/26/2023    Pre-Op Diagnosis Codes:     * Aortic valve stenosis, etiology of cardiac valve disease unspecified [I35.0]    Post-Op Diagnosis: Same       Procedure(s):  TRANSCATHETER AORTIC VALVE REPLACEMENT FEMORAL APPROACH  TRANSCATHETER AORTIC VALVE REPLACEMENT FEMORAL APPROACH    Surgeon(s):  MD Katie Cool MD    Assistant:   First Assistant: Jae Deutsch    Anesthesia: Monitor Anesthesia Care    Estimated Blood Loss (mL): Minimal    Complications: None    Specimens:   * No specimens in log *    Implants:  * No implants in log *      Drains: * No LDAs found *    Findings: 6mm gradient, no PVL, completion angio without issue        Detailed Description of Procedure:   Procedure performed under MAC. he was prepped and draped in sterile fashion. After timeout was performed, the left common femoral artery was accessed under fluoroscopic guidance. Lidocaine had been injected at both sites. Catheter advanced followed by sheath which was flushed. A right femoral vein catheter was inserted without difficulty and flushed using seldinger technique. A right common femoral artery catheter was placed followed by the pigtail in the aortic root. A temporary pacing wire was advanced through the venous sheath under fluoroscopic guidance through the IVC into the right ventricle and tested. 2 Perclose devices were placed in the left femoral artery. Using a Amplatz wire the dry seal sheath was then placed. Heparin was given with satisfactory result of the ACT. The valve was crossed without difficulty. Wire exchanged for a stiff wire. The device was then brought into the descending thoracic aorta and prepared as per device recommendations. The 26 mm Gill S3U Resilia valve was then brought across the aortic arch across the valve in one fluid motion.   Fluoroscopic guidance as

## 2023-09-26 NOTE — TELEPHONE ENCOUNTER
Marsha, can you schedule pt for an echo and OV with Dr. Sergio Bellamy at [facility] in a Thursday TAVR spot in 3-6 weeks for TAVR FU? Same day if possible. Or echo could be done in [place] and if Dr. Sergio Bellamy has office in [place] that day, his OV could be there as well. No Need to call pt as I will let him know prior to DC.  Thanks, CHRIS Lam RN

## 2023-09-26 NOTE — PROGRESS NOTES
Pt verified information regarding surgery, name, birth date, surgeon, procedure and allergies. Patient transferred to [address] [place] [address] 60 for surgery. Appropriate antibiotics ordered: Ancef . Beta blocker: N/A . DVT prophyaxis: ROSE MARIE's and SCD's in place. Lab work within normal limits, 4 units of RBC's on call to OR, vital signs stable, Mepilex sacral border applied and 2% chlorhexidine gluconate skin prep given. Patient and family educated about surgery and pain management. To surgery per bed at 1003.

## 2023-09-26 NOTE — PROGRESS NOTES
Pt. Sheath removed from Right  Femoral  Artery. Manual pressure held for 20 minutes. No bleeding, hematoma, or other complications noted. Quick clot and dressing applied. Pulses unchanged.

## 2023-09-27 VITALS
TEMPERATURE: 97.9 F | OXYGEN SATURATION: 96 % | HEIGHT: 67 IN | RESPIRATION RATE: 18 BRPM | WEIGHT: 184.8 LBS | SYSTOLIC BLOOD PRESSURE: 109 MMHG | DIASTOLIC BLOOD PRESSURE: 54 MMHG | BODY MASS INDEX: 29 KG/M2 | HEART RATE: 85 BPM

## 2023-09-27 LAB
ANION GAP SERPL CALCULATED.3IONS-SCNC: 10 MMOL/L (ref 3–16)
BUN SERPL-MCNC: 21 MG/DL (ref 7–20)
CALCIUM SERPL-MCNC: 8.8 MG/DL (ref 8.3–10.6)
CHLORIDE SERPL-SCNC: 102 MMOL/L (ref 99–110)
CO2 SERPL-SCNC: 26 MMOL/L (ref 21–32)
CREAT SERPL-MCNC: 1.2 MG/DL (ref 0.8–1.3)
DEPRECATED RDW RBC AUTO: 17.1 % (ref 12.4–15.4)
EKG ATRIAL RATE: 86 BPM
EKG DIAGNOSIS: NORMAL
EKG P AXIS: 41 DEGREES
EKG P-R INTERVAL: 140 MS
EKG Q-T INTERVAL: 382 MS
EKG QRS DURATION: 72 MS
EKG QTC CALCULATION (BAZETT): 457 MS
EKG R AXIS: -7 DEGREES
EKG T AXIS: -55 DEGREES
EKG VENTRICULAR RATE: 86 BPM
GFR SERPLBLD CREATININE-BSD FMLA CKD-EPI: >60 ML/MIN/{1.73_M2}
GLUCOSE SERPL-MCNC: 94 MG/DL (ref 70–99)
HCT VFR BLD AUTO: 36.5 % (ref 40.5–52.5)
HGB BLD-MCNC: 11.8 G/DL (ref 13.5–17.5)
MCH RBC QN AUTO: 28.4 PG (ref 26–34)
MCHC RBC AUTO-ENTMCNC: 32.2 G/DL (ref 31–36)
MCV RBC AUTO: 88.4 FL (ref 80–100)
PLATELET # BLD AUTO: 198 K/UL (ref 135–450)
PMV BLD AUTO: 9.1 FL (ref 5–10.5)
POTASSIUM SERPL-SCNC: 3.6 MMOL/L (ref 3.5–5.1)
RBC # BLD AUTO: 4.13 M/UL (ref 4.2–5.9)
SODIUM SERPL-SCNC: 138 MMOL/L (ref 136–145)
WBC # BLD AUTO: 7.1 K/UL (ref 4–11)

## 2023-09-27 PROCEDURE — 85027 COMPLETE CBC AUTOMATED: CPT

## 2023-09-27 PROCEDURE — 94760 N-INVAS EAR/PLS OXIMETRY 1: CPT

## 2023-09-27 PROCEDURE — 93010 ELECTROCARDIOGRAM REPORT: CPT | Performed by: INTERNAL MEDICINE

## 2023-09-27 PROCEDURE — 6370000000 HC RX 637 (ALT 250 FOR IP): Performed by: INTERNAL MEDICINE

## 2023-09-27 PROCEDURE — 80048 BASIC METABOLIC PNL TOTAL CA: CPT

## 2023-09-27 PROCEDURE — 93306 TTE W/DOPPLER COMPLETE: CPT

## 2023-09-27 PROCEDURE — 36415 COLL VENOUS BLD VENIPUNCTURE: CPT

## 2023-09-27 PROCEDURE — 99239 HOSP IP/OBS DSCHRG MGMT >30: CPT | Performed by: INTERNAL MEDICINE

## 2023-09-27 PROCEDURE — 99231 SBSQ HOSP IP/OBS SF/LOW 25: CPT | Performed by: PHYSICIAN ASSISTANT

## 2023-09-27 PROCEDURE — 2580000003 HC RX 258: Performed by: INTERNAL MEDICINE

## 2023-09-27 RX ORDER — CEPHALEXIN 500 MG/1
CAPSULE ORAL
Qty: 4 CAPSULE | Refills: 3 | Status: SHIPPED | OUTPATIENT
Start: 2023-09-27

## 2023-09-27 RX ORDER — METOPROLOL SUCCINATE 25 MG/1
12.5 TABLET, EXTENDED RELEASE ORAL DAILY
Qty: 30 TABLET | Refills: 3 | Status: SHIPPED | OUTPATIENT
Start: 2023-09-28

## 2023-09-27 RX ORDER — CLOPIDOGREL BISULFATE 75 MG/1
75 TABLET ORAL DAILY
Qty: 30 TABLET | Refills: 3 | Status: SHIPPED | OUTPATIENT
Start: 2023-09-28

## 2023-09-27 RX ORDER — LISINOPRIL AND HYDROCHLOROTHIAZIDE 25; 20 MG/1; MG/1
1 TABLET ORAL DAILY
Qty: 30 TABLET | Refills: 0 | Status: SHIPPED | OUTPATIENT
Start: 2023-09-27

## 2023-09-27 RX ORDER — ASPIRIN 81 MG/1
81 TABLET ORAL DAILY
Qty: 30 TABLET | Refills: 3 | Status: SHIPPED | OUTPATIENT
Start: 2023-09-28

## 2023-09-27 RX ORDER — LISINOPRIL 20 MG/1
20 TABLET ORAL DAILY
Status: DISCONTINUED | OUTPATIENT
Start: 2023-09-27 | End: 2023-09-27 | Stop reason: HOSPADM

## 2023-09-27 RX ORDER — METOPROLOL SUCCINATE 25 MG/1
12.5 TABLET, EXTENDED RELEASE ORAL DAILY
Status: DISCONTINUED | OUTPATIENT
Start: 2023-09-27 | End: 2023-09-27 | Stop reason: HOSPADM

## 2023-09-27 RX ADMIN — METOPROLOL SUCCINATE 12.5 MG: 25 TABLET, EXTENDED RELEASE ORAL at 08:58

## 2023-09-27 RX ADMIN — ASPIRIN 81 MG: 81 TABLET, COATED ORAL at 08:58

## 2023-09-27 RX ADMIN — SODIUM CHLORIDE, PRESERVATIVE FREE 10 ML: 5 INJECTION INTRAVENOUS at 08:59

## 2023-09-27 RX ADMIN — DOCUSATE SODIUM 100 MG: 100 CAPSULE, LIQUID FILLED ORAL at 08:58

## 2023-09-27 RX ADMIN — CLOPIDOGREL BISULFATE 75 MG: 75 TABLET ORAL at 08:58

## 2023-09-27 RX ADMIN — ALLOPURINOL 300 MG: 300 TABLET ORAL at 08:57

## 2023-09-27 RX ADMIN — TAMSULOSIN HYDROCHLORIDE 0.4 MG: 0.4 CAPSULE ORAL at 08:58

## 2023-09-27 RX ADMIN — LISINOPRIL 20 MG: 20 TABLET ORAL at 08:58

## 2023-09-27 NOTE — DISCHARGE SUMMARY
medications      ibuprofen 200 MG tablet  Commonly known as: ADVIL;MOTRIN     lisinopril-hydroCHLOROthiazide 20-12.5 MG per tablet  Commonly known as: PRINZIDE;ZESTORETIC  Replaced by: lisinopril-hydroCHLOROthiazide 20-25 MG per tablet               Where to Get Your Medications        These medications were sent to [address]      Phone: [phone]   aspirin 81 MG EC tablet  clopidogrel 75 MG tablet  lisinopril-hydroCHLOROthiazide 20-25 MG per tablet  metoprolol succinate 25 MG extended release tablet        Summary Patients is stable from cardiac standpoint  --Added BB due to bigiminy  --Decreased ACE-I strength due to addition of BB  --Plavix/ASA new for TAVR  Tobacco, diet, salt, activity restrictions/recommendation discussed in detail  [facility] not indicated for AS or for TAVR procedure. Resume only with other indications.    Followup [facility] TAVR clinic 10/19/23 w echo prior     Signed:  Kane Marie MD, 9/27/2023, 2:07 PM  Time spent on discharge of patient: >31 minutes

## 2023-09-27 NOTE — DISCHARGE INSTRUCTIONS
feet  Increasing shortness of breath  Change in the color or temperature of your lower legs and feet  Develop abdominal pain or unrelieved nausea and/or vomiting  Develop any redness, incision, or limited movement of your arms or legs  Signs of infection: redness, incision hot to the touch, fevers greater than 101 degrees, or colored drainage from your incision  Seroma is an accumulation of fluid in the tissues at the groin surgical site. Symptoms may include: a lump near the groin surgical site, clear fluid draining from the site, redness, warmth, or swelling.     Bleeding that is not stopped after holding pressure for 10 minutes  Unusual pain, numbness or tingling at the groin or down the leg

## 2023-09-27 NOTE — PROGRESS NOTES
CLINICAL PHARMACY NOTE: MEDS TO BEDS    Total # of Prescriptions Filled: 4   The following medications were delivered to the patient:  METOPROLOL SUCCINATE ER 25MG  CLOPIDOGREL BISULFATE 75MG  ASPIRIN 81MG  LISINOPRIL - HYDROCHLOROTH 20    Additional Documentation:  Patient picked up in outpatient pharmacy = signed   [address]

## 2023-09-27 NOTE — PROGRESS NOTES
Rounded with Dr. Darlyn Ferreira. Pt awake, alert. Denies CP, SOB, dizziness or groin pain. Bilat groins sites unremarkable. SR w bigeminal PVCs on monitor (unchanged from pre TAVR ECG's). Pt asymptomatic w ectopy. Plan to start Toprol 12.5mg QD and decrease Lisinopril dose. States has been up in halls ambulating with out difficulty. Voiding well. Will plan to DC pending echo results.  Billy BAILEY TAVR Coordinator

## 2023-09-27 NOTE — PROGRESS NOTES
Patient refusing wheelchair to lobby for discharge, prefers to walk. Plans to stop at out patient pharmacy prior to leaving.

## 2023-09-27 NOTE — CARE COORDINATION
Discharge Planning Note:  Chart reviewed for discharge needs and it appears that patient has minimal needs for discharge at this time. Risk Score 8 %   Status post TAVR    Primary Care Physician is MEGHAN Rangel NP  Primary insurance is [facility] Medicare    Please notify case management if any discharge needs are identified. Case management will continue to follow progress and update discharge plan as needed.

## 2023-09-27 NOTE — PROGRESS NOTES
Bilateral groin sites WNL. Pt ambulated entire unit in the evening and this AM, tolerated well. Call light within reach, garth hose and non skid socks on.

## 2023-09-27 NOTE — PROGRESS NOTES
Data- discharge order received, pt verbalized agreement to discharge, disposition to previous residence, no needs for HHC/DME. Action- discharge instructions prepared and given to patient, pt verbalized understanding. Medication information packet given r/t NEW and/or CHANGED prescriptions emphasizing name/purpose/side effects, pt verbalized understanding. Discharge instruction summary: Diet- General, Activity- as tolerated, Primary Care Physician as follows: MEGHAN Seo [phone] f/u appointment, prescription medications filled meds to bed. Inpatient surgical procedure precautions reviewed: TAVR     1. WEIGHT: Admit Weight - Scale: 184 lb (83.5 kg) (09/26/23 0933)        Today  Weight - Scale: 184 lb 12.8 oz (83.8 kg) (09/27/23 0515)       2. O2 SAT.: SpO2: 96 % (09/27/23 0855)    Response- Pt belongings gathered, IV removed. Disposition is home (no HHC/DME needs), transported with belongings, walked to outpatient pharmacy/lobby w/ daughter, no complications.

## 2023-09-28 ENCOUNTER — CARE COORDINATION (OUTPATIENT)
Dept: CASE MANAGEMENT | Age: 83
End: 2023-09-28

## 2023-09-28 NOTE — CARE COORDINATION
[address] [address] [facility],[address] 250 Care Transitions Initial Follow Up Call    Call within 2 business days of discharge: Yes    First attempt at 24 hour discharge call, attempted both contact numbers, no answer, CTN left  with contact information and request for return call. CTN will continue with outreach call attempts.       Follow Up  Future Appointments   Date Time Provider 4600  [facility]   10/16/2023  9:30 AM SCHEDULE, [facility] ECHO ROOM 3 [facility]   10/19/2023  2:30 PM Anastacio Fleming MD [facility]   2/26/2024  9:20 AM Arnol Lopez [facility]   3/21/2024  9:00 AM Orlando Pereyra APRN - NP [facility] [address] [facility] [address]       Pacheco Peterson RN

## 2023-09-29 ENCOUNTER — CARE COORDINATION (OUTPATIENT)
Dept: CASE MANAGEMENT | Age: 83
End: 2023-09-29

## 2023-09-29 DIAGNOSIS — I35.0 NONRHEUMATIC AORTIC VALVE STENOSIS: Primary | ICD-10-CM

## 2023-09-29 PROCEDURE — 1111F DSCHRG MED/CURRENT MED MERGE: CPT | Performed by: NURSE PRACTITIONER

## 2023-09-29 NOTE — CARE COORDINATION
[address] [facility] 250 Care Transitions Initial Follow Up Call    Call within 2 business days of discharge: Yes    Patient Current Location:  Home: [address] [place] [address]    Care Transition Nurse contacted the patient by telephone to perform post hospital discharge assessment. Verified name and  with patient as identifiers. Provided introduction to self, and explanation of the Care Transition Nurse role. Patient: Pete Wiley Patient : 1940   MRN: [id number]  Reason for Admission: TAVR  Discharge Date: 23 RARS: Readmission Risk Score: 10.8      Last Discharge [address] [facility]       Date Complaint Diagnosis Description Type Department Provider    23   Admission (Discharged) Joe Rodrigez MD            Was this an external facility discharge? No Discharge Facility:     Challenges to be reviewed by the provider   Additional needs identified to be addressed with provider: No  none               Method of communication with provider: none. Patient reports that he is doing well, no redness, swelling, drainage at incision/puncture site, patient is afebrile, no SOB, chest pain. Discussed discharge instructions and reviewed medications, 1111F completed. He is scheduled for an echo and cardiology follow up in a couple of weeks, CTN scheduled TCM/hospital f/u with PCP for Tuesday 10/3/23. Patient denies any questions or concerns at this time. CTN will continue with outreach follow up calls. Care Transition Nurse reviewed discharge instructions, medical action plan, and red flags with patient who verbalized understanding. The patient was given an opportunity to ask questions and does not have any further questions or concerns at this time. Were discharge instructions available to patient? Yes. Reviewed appropriate site of care based on symptoms and resources available to patient including: PCP  Urgent care clinics  When to call 911.  The patient agrees to contact the PCP office

## 2023-10-03 ENCOUNTER — OFFICE VISIT (OUTPATIENT)
Dept: FAMILY MEDICINE CLINIC | Age: 83
End: 2023-10-03

## 2023-10-03 VITALS
SYSTOLIC BLOOD PRESSURE: 120 MMHG | DIASTOLIC BLOOD PRESSURE: 62 MMHG | OXYGEN SATURATION: 98 % | HEIGHT: 67 IN | BODY MASS INDEX: 28.6 KG/M2 | HEART RATE: 84 BPM | WEIGHT: 182.2 LBS | TEMPERATURE: 97.9 F

## 2023-10-03 DIAGNOSIS — Z95.2 S/P TAVR (TRANSCATHETER AORTIC VALVE REPLACEMENT): Primary | ICD-10-CM

## 2023-10-03 DIAGNOSIS — M19.012 ARTHRITIS OF LEFT SHOULDER REGION: ICD-10-CM

## 2023-10-03 DIAGNOSIS — L98.9 SKIN LESION OF FACE: ICD-10-CM

## 2023-10-03 RX ORDER — AMOXICILLIN 500 MG/1
CAPSULE ORAL
Qty: 4 CAPSULE | Refills: 0 | Status: SHIPPED | OUTPATIENT
Start: 2023-10-03

## 2023-10-03 ASSESSMENT — ENCOUNTER SYMPTOMS
COUGH: 0
ABDOMINAL PAIN: 0
SHORTNESS OF BREATH: 0

## 2023-10-03 NOTE — PROGRESS NOTES
Outpatient Medications   Medication Sig Dispense Refill    amoxicillin (AMOXIL) 500 MG capsule Take 2g 1 hour prior to dental cleaning. 4 capsule 0    Blood Pressure Monitoring (COMFORT TOUCH BP CUFF/MEDIUM) MISC 1 Device by Does not apply route daily as needed (check BP daily) 1 each 0    aspirin 81 MG EC tablet Take 1 tablet by mouth daily 30 tablet 3    metoprolol succinate (TOPROL XL) 25 MG extended release tablet Take 0.5 tablets by mouth daily 30 tablet 3    lisinopril-hydroCHLOROthiazide (PRINZIDE;ZESTORETIC) 20-25 MG per tablet Take 1 tablet by mouth daily 30 tablet 0    clopidogrel (PLAVIX) 75 MG tablet Take 1 tablet by mouth daily 30 tablet 3    hydrocortisone (LOCOID) 0.1 % OINT oitment APPLY UP TO TWICE A DAY AS NEEDED FOR RASH 45 g 0    allopurinol (ZYLOPRIM) 300 MG tablet TAKE ONE TABLET BY MOUTH DAILY 90 tablet 1    ezetimibe-simvastatin (VYTORIN) 10-40 MG per tablet TAKE ONE TABLET BY MOUTH DAILY 90 tablet 2    hydrocortisone (ANUSOL-HC) 2.5 % CREA rectal cream To be applied locally. 28 g 5    tamsulosin (FLOMAX) 0.4 MG capsule Take 1 capsule by mouth daily 90 capsule 1    MAGNESIUM PO Take 250 mg by mouth       No current facility-administered medications for this visit. Past Medical History:   Diagnosis Date    Abnormal breath sounds 12/3/2019    Aortic stenosis, mild 03/27/2018    Echo 3/30/18 - mild  with preserved EF    Calculus of gallbladder without cholecystitis without obstruction 12/27/2019    Colitis     Dermatitis 6/24/2019    Diverticulitis     Diverticulosis     Elevated liver enzymes 11/19/2018    Essential hypertension, benign     Gout     Hyperlipidemia     Malignant melanoma of neck ([address])     Malignant melanoma of neck ([address]) 9/24/2013    Obesity, Class I, BMI 30.0-34.9 (see actual BMI) 6/24/2019    Overweight with body mass index (BMI) 25.0-29.9 6/24/2019    Prostatism 6/24/2019    Follows with urology q 6 month psa.  5.2019 1.6     Pulmonary nodule 12/27/2019    Needs CT

## 2023-10-05 ENCOUNTER — TELEPHONE (OUTPATIENT)
Dept: CARDIOLOGY | Age: 83
End: 2023-10-05

## 2023-10-05 NOTE — TELEPHONE ENCOUNTER
Pt states that he is doing very well post TAVR. States groins are fine, denies SOB, CP. Walking 1.5 miles daily. Aware of upcoming echo and OV appts.  Billy BAILEY

## 2023-10-06 ENCOUNTER — CARE COORDINATION (OUTPATIENT)
Dept: CASE MANAGEMENT | Age: 83
End: 2023-10-06

## 2023-10-09 ENCOUNTER — CARE COORDINATION (OUTPATIENT)
Dept: CASE MANAGEMENT | Age: 83
End: 2023-10-09

## 2023-10-09 DIAGNOSIS — R35.1 NOCTURIA: ICD-10-CM

## 2023-10-09 RX ORDER — TAMSULOSIN HYDROCHLORIDE 0.4 MG/1
0.4 CAPSULE ORAL DAILY
Qty: 90 CAPSULE | Refills: 1 | Status: SHIPPED | OUTPATIENT
Start: 2023-10-09

## 2023-10-09 NOTE — CARE COORDINATION
Second follow up outreach call attempt, no answer. CTN left  with contact information and request for return call. CTN will remain available.

## 2023-10-10 ENCOUNTER — CARE COORDINATION (OUTPATIENT)
Dept: CASE MANAGEMENT | Age: 83
End: 2023-10-10

## 2023-10-10 NOTE — CARE COORDINATION
Care Transitions Follow Up Call    Patient Current Location:  Home: [address] [facility]    Care Transition Nurse contacted the patient by telephone. Verified name and  with patient as identifiers. Patient: Blanco Medeiros  Patient : 1940   MRN: [id number]  Reason for Admission: TAVR  Discharge Date: 23 RARS: Readmission Risk Score: 10.8      Needs to be reviewed by the provider   Additional needs identified to be addressed with provider: No  none             Method of communication with provider: none. Patient reports that he is doing very well, walking 5 days a weak and feeling \"great\". He did have inquiry about timing of flu shot and when it would be safe to get the vaccination. CTN referred him to cardiology, he will call Tayla Farah..  He recalls asking her, but was not sure what she had said. He denies any other questions or concerns at this time. CTN will continue with outreach follow up calls. Follow Up  Future Appointments   Date Time Provider [address]   10/16/2023  9:30 AM SCHEDULE, [facility] ECHO ROOM 3 [facility]   10/19/2023  2:30 PM Js Sorto MD [facility]   2024  9:20 AM Shreyas Roldan [facility]   3/21/2024  9:00 AM Mely Bonilla, APRN - NP [facility] - JOV[place]NY     External follow up appointment(s):     Care Transition Nurse reviewed red flags with patient and discussed any barriers to care and/or understanding of plan of care after discharge. Discussed appropriate site of care based on symptoms and resources available to patient including: PCP  Urgent care clinics  When to call 911. The patient agrees to contact the PCP office for questions related to their healthcare. Advance Care Planning:   reviewed and current.          Offered patient enrollment in the Remote Patient Monitoring (RPM) program for in-home monitoring: NA. Well-controlled HTN     Care Transitions Subsequent and Final Call    Subsequent

## 2023-10-16 ENCOUNTER — HOSPITAL ENCOUNTER (OUTPATIENT)
Dept: NON INVASIVE DIAGNOSTICS | Age: 83
Discharge: HOME | End: 2023-10-16
Payer: MEDICARE

## 2023-10-16 DIAGNOSIS — Z95.2 S/P TAVR (TRANSCATHETER AORTIC VALVE REPLACEMENT): ICD-10-CM

## 2023-10-16 PROCEDURE — 93306 TTE W/DOPPLER COMPLETE: CPT

## 2023-10-17 ENCOUNTER — CARE COORDINATION (OUTPATIENT)
Dept: CASE MANAGEMENT | Age: 83
End: 2023-10-17

## 2023-10-17 NOTE — CARE COORDINATION
concerns that I can assist you with?: No  Identified Barriers: None  Care Transitions Interventions                          Other Interventions:             Care Transition Nurse provided contact information for future needs. Plan for follow-up call in 5-7 days based on severity of symptoms and risk factors.   Plan for next call: symptom management-.  self management-.  follow-up appointment-.    Lizette Paul RN

## 2023-10-18 NOTE — PROGRESS NOTES
[address] [facility]  Cardiology Consult    Serjio Patient  1940    October 18, 2023      Reason for Referral: Aortic stenosis    Referring physician: Dr Kelvin Low    CC: \"Doing okay\"      Subjective:     History of Present Illness:    Serjio Patient is a 82 y.o. patient with a PMH significant for aortic stenosis,hypertension, and hyperlipidemia. He had an echocardiogram which showed severe aortic stenosis and a referral was placed for evaluation of TAVR. He is s/p TAVR, 9/26/2023; [place] Kali 3, Ultra 26mm  Today Patient denies exertional chest pain/pressure, dyspnea at rest, PND, orthopnea, palpitations, weight changes, changes in LE edema, and syncope. Past Medical History:   has a past medical history of Abnormal breath sounds, Aortic stenosis, mild, Calculus of gallbladder without cholecystitis without obstruction, Colitis, Dermatitis, Diverticulitis, Diverticulosis, Elevated liver enzymes, Essential hypertension, benign, Gout, Hyperlipidemia, Malignant melanoma of neck ([address]), Malignant melanoma of neck ([address]), Obesity, Class I, BMI 30.0-34.9 (see actual BMI), Overweight with body mass index (BMI) 25.0-29.9, Prostatism, Pulmonary nodule, and PVC's (premature ventricular contractions). Surgical History:   has a past surgical history that includes colectomy; bladder repair (07/23/1998); Colonoscopy; Tonsillectomy (1947); tumor removal (1970); Skin cancer excision (09/30/2013); hernia repair (1969, 1999, 2005); Shoulder arthroscopy (Right, 04/12/2023); Carpal tunnel release (Bilateral, 06/2023); Cardiac surgery (N/A, 9/26/2023); and Cardiac surgery (N/A, 9/26/2023). Social History:   reports that he quit smoking about 46 years ago. His smoking use included cigarettes. He has a 18.00 pack-year smoking history. He has been exposed to tobacco smoke. He has never used smokeless tobacco. He reports current alcohol use of about 3.0 - 4.0 standard drinks of alcohol per week.  He reports that he does

## 2023-10-19 ENCOUNTER — OFFICE VISIT (OUTPATIENT)
Dept: CARDIOLOGY CLINIC | Age: 83
End: 2023-10-19
Payer: MEDICARE

## 2023-10-19 ENCOUNTER — NURSE ONLY (OUTPATIENT)
Dept: FAMILY MEDICINE CLINIC | Age: 83
End: 2023-10-19
Payer: MEDICARE

## 2023-10-19 VITALS
BODY MASS INDEX: 28.98 KG/M2 | DIASTOLIC BLOOD PRESSURE: 70 MMHG | SYSTOLIC BLOOD PRESSURE: 122 MMHG | HEART RATE: 52 BPM | WEIGHT: 185 LBS | OXYGEN SATURATION: 96 %

## 2023-10-19 DIAGNOSIS — I10 ESSENTIAL HYPERTENSION: ICD-10-CM

## 2023-10-19 DIAGNOSIS — I35.0 NONRHEUMATIC AORTIC VALVE STENOSIS: Primary | ICD-10-CM

## 2023-10-19 DIAGNOSIS — Z23 NEEDS FLU SHOT: Primary | ICD-10-CM

## 2023-10-19 DIAGNOSIS — E78.5 HYPERLIPIDEMIA LDL GOAL <130: ICD-10-CM

## 2023-10-19 PROCEDURE — 90694 VACC AIIV4 NO PRSRV 0.5ML IM: CPT | Performed by: NURSE PRACTITIONER

## 2023-10-19 PROCEDURE — 3078F DIAST BP <80 MM HG: CPT | Performed by: INTERNAL MEDICINE

## 2023-10-19 PROCEDURE — 99214 OFFICE O/P EST MOD 30 MIN: CPT | Performed by: INTERNAL MEDICINE

## 2023-10-19 PROCEDURE — 1123F ACP DISCUSS/DSCN MKR DOCD: CPT | Performed by: INTERNAL MEDICINE

## 2023-10-19 PROCEDURE — 3074F SYST BP LT 130 MM HG: CPT | Performed by: INTERNAL MEDICINE

## 2023-10-19 RX ORDER — LISINOPRIL AND HYDROCHLOROTHIAZIDE 25; 20 MG/1; MG/1
1 TABLET ORAL DAILY
Qty: 90 TABLET | Refills: 3 | Status: SHIPPED | OUTPATIENT
Start: 2023-10-19

## 2023-10-19 RX ORDER — METOPROLOL SUCCINATE 25 MG/1
12.5 TABLET, EXTENDED RELEASE ORAL DAILY
Qty: 30 TABLET | Refills: 5 | Status: SHIPPED | OUTPATIENT
Start: 2023-10-19

## 2023-10-19 RX ORDER — ASPIRIN 81 MG/1
81 TABLET ORAL DAILY
Qty: 90 TABLET | Refills: 3 | Status: SHIPPED | OUTPATIENT
Start: 2023-10-19

## 2023-10-19 RX ORDER — CLOPIDOGREL BISULFATE 75 MG/1
75 TABLET ORAL DAILY
Qty: 90 TABLET | Refills: 3 | Status: SHIPPED | OUTPATIENT
Start: 2023-10-19

## 2023-10-24 ENCOUNTER — CARE COORDINATION (OUTPATIENT)
Dept: CASE MANAGEMENT | Age: 83
End: 2023-10-24

## 2023-10-24 NOTE — CARE COORDINATION
Care Transitions Follow Up Call    Patient Current Location:  Home: [address] [facility]    Care Transition Nurse contacted the patient by telephone. Verified name and  with patient as identifiers. Patient: Paris Loving  Patient : 1940   MRN: [id number]  Reason for Admission: TAVR  Discharge Date: 23 RARS: Readmission Risk Score: 10.8      Needs to be reviewed by the provider   Additional needs identified to be addressed with provider: No  none             Method of communication with provider: none. Final outreach follow up call:  Patient reports that he is doing well, denies any questions or concerns at this time. CTN will close program and remain available. Follow Up  Future Appointments   Date Time Provider [address]   2024  9:20 AM Jose M Lisa DO [facility] PUL [facility]   3/21/2024  9:00 AM Chase Bonilla, MEGHAN - NP [facility] - JOV[place]NY     External follow up appointment(s):     Care Transition Nurse reviewed red flags with patient and discussed any barriers to care and/or understanding of plan of care after discharge. Discussed appropriate site of care based on symptoms and resources available to patient including: PCP  Urgent care clinics  When to call 911. The patient agrees to contact the PCP office for questions related to their healthcare. Offered patient enrollment in the Remote Patient Monitoring (RPM) program for in-home monitoring:  Well-controlled HTN .      Care Transitions Subsequent and Final Call    Subsequent and Final Calls  Do you have any ongoing symptoms?: No  Have your medications changed?: No  Do you have any questions related to your medications?: No  Do you currently have any active services?: No  Do you have any needs or concerns that I can assist you with?: No  Identified Barriers: None  Care Transitions Interventions                          Other Interventions:             Care Transition Nurse provided

## 2023-11-30 ENCOUNTER — TELEPHONE (OUTPATIENT)
Dept: FAMILY MEDICINE CLINIC | Age: 83
End: 2023-11-30

## 2023-11-30 RX ORDER — AZELASTINE 1 MG/ML
1 SPRAY, METERED NASAL 2 TIMES DAILY
Qty: 2 EACH | Refills: 1 | Status: SHIPPED | OUTPATIENT
Start: 2023-11-30

## 2023-11-30 NOTE — TELEPHONE ENCOUNTER
Pt had been prescribed azelastine 0.1% nasal spray by a previous provider. Pt wants to know if Salty Martínez can give him a refill on this? Pt uses [facility] in [place].

## 2023-12-04 ENCOUNTER — TELEPHONE (OUTPATIENT)
Dept: FAMILY MEDICINE CLINIC | Age: 83
End: 2023-12-04

## 2023-12-04 RX ORDER — AZELASTINE 1 MG/ML
1 SPRAY, METERED NASAL 2 TIMES DAILY
Qty: 2 EACH | Refills: 1 | Status: SHIPPED | OUTPATIENT
Start: 2023-12-04

## 2023-12-04 NOTE — TELEPHONE ENCOUNTER
----- Message from [facility] sent at 12/4/2023 10:53 AM EST -----  Subject: Medication Problem    Medication: azelastine (ASTELIN) 0.1 % nasal spray  Dosage: 0.1   Ordering Provider: Tabatha Willams    Question/Problem: Medication was sent to wrong pharmacy and pt has yet to   receive medication . Please re send medication to [facility] [id number]   - [address], [address] [facility] [address] [facility] [address] [place] [phone]       Pharmacy: [facility] [id number] - [address], [address] [facility] [place] [phone]    ---------------------------------------------------------------------------  --------------  Selvin Dooley INFO  [phone]; OK to leave message on voicemail  ---------------------------------------------------------------------------  --------------    SCRIPT ANSWERS  Relationship to Patient: Self

## 2023-12-24 DIAGNOSIS — M10.9 GOUT OF FOOT, UNSPECIFIED CAUSE, UNSPECIFIED CHRONICITY, UNSPECIFIED LATERALITY: ICD-10-CM

## 2023-12-26 RX ORDER — ALLOPURINOL 300 MG/1
TABLET ORAL
Qty: 90 TABLET | Refills: 1 | Status: SHIPPED | OUTPATIENT
Start: 2023-12-26

## 2024-01-15 ENCOUNTER — OFFICE VISIT (OUTPATIENT)
Dept: FAMILY MEDICINE CLINIC | Age: 84
End: 2024-01-15
Payer: MEDICARE

## 2024-01-15 VITALS
OXYGEN SATURATION: 97 % | WEIGHT: 191.6 LBS | DIASTOLIC BLOOD PRESSURE: 82 MMHG | TEMPERATURE: 98.2 F | HEART RATE: 86 BPM | HEIGHT: 67 IN | BODY MASS INDEX: 30.07 KG/M2 | SYSTOLIC BLOOD PRESSURE: 136 MMHG

## 2024-01-15 DIAGNOSIS — R09.82 POST-NASAL DRAINAGE: Primary | ICD-10-CM

## 2024-01-15 DIAGNOSIS — J02.9 ACUTE VIRAL PHARYNGITIS: ICD-10-CM

## 2024-01-15 PROCEDURE — 99213 OFFICE O/P EST LOW 20 MIN: CPT | Performed by: NURSE PRACTITIONER

## 2024-01-15 PROCEDURE — 3079F DIAST BP 80-89 MM HG: CPT | Performed by: NURSE PRACTITIONER

## 2024-01-15 PROCEDURE — 3075F SYST BP GE 130 - 139MM HG: CPT | Performed by: NURSE PRACTITIONER

## 2024-01-15 PROCEDURE — 1123F ACP DISCUSS/DSCN MKR DOCD: CPT | Performed by: NURSE PRACTITIONER

## 2024-01-15 RX ORDER — METHYLPREDNISOLONE 4 MG/1
TABLET ORAL
Qty: 1 KIT | Refills: 0 | Status: SHIPPED | OUTPATIENT
Start: 2024-01-15 | End: 2024-01-21

## 2024-01-15 RX ORDER — FLUTICASONE PROPIONATE 50 MCG
1 SPRAY, SUSPENSION (ML) NASAL DAILY
Qty: 16 G | Refills: 2 | Status: SHIPPED | OUTPATIENT
Start: 2024-01-15

## 2024-01-15 RX ORDER — BETAMETHASONE DIPROPIONATE 0.5 MG/G
LOTION TOPICAL 2 TIMES DAILY
COMMUNITY

## 2024-01-15 ASSESSMENT — ENCOUNTER SYMPTOMS
RHINORRHEA: 1
SORE THROAT: 1
SHORTNESS OF BREATH: 0
ABDOMINAL PAIN: 0
COUGH: 1

## 2024-01-15 ASSESSMENT — PATIENT HEALTH QUESTIONNAIRE - PHQ9
SUM OF ALL RESPONSES TO PHQ QUESTIONS 1-9: 0
2. FEELING DOWN, DEPRESSED OR HOPELESS: 0
1. LITTLE INTEREST OR PLEASURE IN DOING THINGS: 0
SUM OF ALL RESPONSES TO PHQ9 QUESTIONS 1 & 2: 0

## 2024-01-15 NOTE — PROGRESS NOTES
Praful Esposito (:  1940) is a 83 y.o. male,Established patient, here for evaluation of the following chief complaint(s):  Cough (Past few days has had cough, clearing of throat, sore throat, drainage, no nasal congestion but has runny nose. No fever. Has sore throat and hurts to swallow. Slight facial pressure, no body aches more than normal.  Pt took coricidin. )         ASSESSMENT/PLAN:  1. Post-nasal drainage  New. Use flonase as discussed.   - fluticasone (FLONASE) 50 MCG/ACT nasal spray; 1 spray by Each Nostril route daily  Dispense: 16 g; Refill: 2    2. Acute viral pharyngitis  New. Take dose pack for discomfort.   - methylPREDNISolone (MEDROL DOSEPACK) 4 MG tablet; Take by mouth.  Dispense: 1 kit; Refill: 0       Return if symptoms worsen or fail to improve.         Subjective   SUBJECTIVE/OBJECTIVE:  HPI  Chief Complaint   Patient presents with    Cough     Past few days has had cough, clearing of throat, sore throat, drainage, no nasal congestion but has runny nose. No fever. Has sore throat and hurts to swallow. Slight facial pressure, no body aches more than normal.  Pt took coricidin.    Pharyngitis for the last few days, clearing throat along with PND. Some relief with coricidan. Denies fevers, body aches, exposure to sick persons. Denies abd pain, n/v. PO Intake is good.   Current Outpatient Medications   Medication Sig Dispense Refill    betamethasone dipropionate 0.05 % lotion Apply topically 2 times daily Apply topically 2 times daily.      fluticasone (FLONASE) 50 MCG/ACT nasal spray 1 spray by Each Nostril route daily 16 g 2    methylPREDNISolone (MEDROL DOSEPACK) 4 MG tablet Take by mouth. 1 kit 0    allopurinol (ZYLOPRIM) 300 MG tablet TAKE 1 TABLET BY MOUTH DAILY 90 tablet 1    azelastine (ASTELIN) 0.1 % nasal spray 1 spray by Nasal route 2 times daily Use in each nostril as directed 2 each 1    aspirin 81 MG EC tablet Take 1 tablet by mouth daily 90 tablet 3    clopidogrel

## 2024-01-22 ENCOUNTER — OFFICE VISIT (OUTPATIENT)
Dept: FAMILY MEDICINE CLINIC | Age: 84
End: 2024-01-22
Payer: MEDICARE

## 2024-01-22 VITALS
HEIGHT: 67 IN | HEART RATE: 90 BPM | SYSTOLIC BLOOD PRESSURE: 110 MMHG | DIASTOLIC BLOOD PRESSURE: 62 MMHG | TEMPERATURE: 98.8 F | WEIGHT: 190.8 LBS | BODY MASS INDEX: 29.95 KG/M2 | OXYGEN SATURATION: 98 %

## 2024-01-22 DIAGNOSIS — R05.1 ACUTE COUGH: Primary | ICD-10-CM

## 2024-01-22 DIAGNOSIS — R05.8 PRODUCTIVE COUGH: ICD-10-CM

## 2024-01-22 DIAGNOSIS — H10.31 ACUTE BACTERIAL CONJUNCTIVITIS OF RIGHT EYE: ICD-10-CM

## 2024-01-22 LAB
INFLUENZA A ANTIGEN, POC: NEGATIVE
INFLUENZA B ANTIGEN, POC: NEGATIVE
LOT EXPIRE DATE: NORMAL
LOT KIT NUMBER: NORMAL
SARS-COV-2, POC: NORMAL
VALID INTERNAL CONTROL: NORMAL
VENDOR AND KIT NAME POC: NORMAL

## 2024-01-22 PROCEDURE — 3074F SYST BP LT 130 MM HG: CPT | Performed by: NURSE PRACTITIONER

## 2024-01-22 PROCEDURE — 1123F ACP DISCUSS/DSCN MKR DOCD: CPT | Performed by: NURSE PRACTITIONER

## 2024-01-22 PROCEDURE — 99213 OFFICE O/P EST LOW 20 MIN: CPT | Performed by: NURSE PRACTITIONER

## 2024-01-22 PROCEDURE — 3078F DIAST BP <80 MM HG: CPT | Performed by: NURSE PRACTITIONER

## 2024-01-22 PROCEDURE — 87428 SARSCOV & INF VIR A&B AG IA: CPT | Performed by: NURSE PRACTITIONER

## 2024-01-22 RX ORDER — AMOXICILLIN 500 MG/1
500 CAPSULE ORAL 2 TIMES DAILY
Qty: 14 CAPSULE | Refills: 0 | Status: SHIPPED | OUTPATIENT
Start: 2024-01-22 | End: 2024-01-29

## 2024-01-22 RX ORDER — AZITHROMYCIN 250 MG/1
250 TABLET, FILM COATED ORAL SEE ADMIN INSTRUCTIONS
Qty: 6 TABLET | Refills: 0 | Status: CANCELLED | OUTPATIENT
Start: 2024-01-22 | End: 2024-01-27

## 2024-01-22 RX ORDER — HYDROCODONE POLISTIREX AND CHLORPHENIRAMINE POLISTIREX 10; 8 MG/5ML; MG/5ML
5 SUSPENSION, EXTENDED RELEASE ORAL EVERY 12 HOURS PRN
Qty: 50 ML | Refills: 0 | Status: SHIPPED | OUTPATIENT
Start: 2024-01-22 | End: 2024-01-27

## 2024-01-22 ASSESSMENT — ENCOUNTER SYMPTOMS
COUGH: 1
ABDOMINAL PAIN: 0
EYE DISCHARGE: 1
CHEST TIGHTNESS: 1
EYE REDNESS: 1

## 2024-01-22 NOTE — PROGRESS NOTES
Praful Esposito (:  1940) is a 83 y.o. male,Established patient, here for evaluation of the following chief complaint(s):  Cough (Pt is feeling worse from when he was in. Productive cough with some blood tinge at times, sinus congestion, no fever, sneezing, sore throat, slight runny nose, left nostril bleeding, sneezing, right eye conjunctivitis and red, chest congestion. Trying coricidin cold and flu [employer] brand. Helped at first)         ASSESSMENT/PLAN:  1. Acute cough  - POCT COVID-19 & Influenza A/B    2. Productive cough  Not controlled. Take meds as prescribed.   - HYDROcodone-chlorpheniramine (TUSSIONEX) 10-8 MG/5ML SUER; Take 5 mLs by mouth every 12 hours as needed (cough) for up to 5 days. Max Daily Amount: 10 mLs  Dispense: 50 mL; Refill: 0  - amoxicillin (AMOXIL) 500 MG capsule; Take 1 capsule by mouth 2 times daily for 7 days  Dispense: 14 capsule; Refill: 0    3. Acute bacterial conjunctivitis of right eye  New, take med as prescribed.   - amoxicillin (AMOXIL) 500 MG capsule; Take 1 capsule by mouth 2 times daily for 7 days  Dispense: 14 capsule; Refill: 0       Return if symptoms worsen or fail to improve.         Subjective   SUBJECTIVE/OBJECTIVE:  HPI  Chief Complaint   Patient presents with    Cough     Pt is feeling worse from when he was in. Productive cough with some blood tinge at times, sinus congestion, no fever, sneezing, sore throat, slight runny nose, left nostril bleeding, sneezing, right eye conjunctivitis and red, chest congestion. Trying coricidin cold and flu [employer] brand. Helped at first   No relief with flonase/medrol dose pack or OTC treatments. States symptoms are worsening. PO intake is good.       Current Outpatient Medications   Medication Sig Dispense Refill    HYDROcodone-chlorpheniramine (TUSSIONEX) 10-8 MG/5ML SUER Take 5 mLs by mouth every 12 hours as needed (cough) for up to 5 days. Max Daily Amount: 10 mLs 50 mL 0    amoxicillin (AMOXIL) 500 MG capsule Take 1

## 2024-02-19 RX ORDER — EZETIMIBE AND SIMVASTATIN 10; 40 MG/1; MG/1
TABLET ORAL
Qty: 90 TABLET | Refills: 2 | Status: SHIPPED | OUTPATIENT
Start: 2024-02-19

## 2024-02-26 ENCOUNTER — OFFICE VISIT (OUTPATIENT)
Dept: PULMONOLOGY | Age: 84
End: 2024-02-26
Payer: MEDICARE

## 2024-02-26 VITALS
BODY MASS INDEX: 30.61 KG/M2 | SYSTOLIC BLOOD PRESSURE: 122 MMHG | WEIGHT: 195 LBS | HEART RATE: 78 BPM | HEIGHT: 67 IN | RESPIRATION RATE: 18 BRPM | DIASTOLIC BLOOD PRESSURE: 74 MMHG | TEMPERATURE: 97.4 F | OXYGEN SATURATION: 97 %

## 2024-02-26 DIAGNOSIS — R91.8 PULMONARY NODULES: ICD-10-CM

## 2024-02-26 DIAGNOSIS — J61 ASBESTOSIS (HCC): Primary | ICD-10-CM

## 2024-02-26 DIAGNOSIS — R05.2 SUBACUTE COUGH: ICD-10-CM

## 2024-02-26 DIAGNOSIS — R93.89 ABNORMAL CT OF THE CHEST: ICD-10-CM

## 2024-02-26 PROCEDURE — 3078F DIAST BP <80 MM HG: CPT | Performed by: INTERNAL MEDICINE

## 2024-02-26 PROCEDURE — 3074F SYST BP LT 130 MM HG: CPT | Performed by: INTERNAL MEDICINE

## 2024-02-26 PROCEDURE — 99214 OFFICE O/P EST MOD 30 MIN: CPT | Performed by: INTERNAL MEDICINE

## 2024-02-26 PROCEDURE — 1123F ACP DISCUSS/DSCN MKR DOCD: CPT | Performed by: INTERNAL MEDICINE

## 2024-02-26 NOTE — PROGRESS NOTES
Chief complaint  This is a 83 y.o. year old male  who comes to see me with a chief complaint of   Chief Complaint   Patient presents with    Follow-up     Pulm. Nodules         HPI  Here with a cc of lung nodule, abnormal CT chest     He is doing ok.  More coughing of late with some mucus.  Not horrible but different.  He did have TAVR last Fall and did fine.  He has not noticed anything different.  He is trying to get benefits from asbestosis exposure.  Did not qualify based on PFT but he just did repeat ones.          Current Outpatient Medications:     ezetimibe-simvastatin (VYTORIN) 10-40 MG per tablet, TAKE ONE TABLET BY MOUTH DAILY, Disp: 90 tablet, Rfl: 2    betamethasone dipropionate 0.05 % lotion, Apply topically 2 times daily Apply topically 2 times daily., Disp: , Rfl:     fluticasone (FLONASE) 50 MCG/ACT nasal spray, 1 spray by Each Nostril route daily, Disp: 16 g, Rfl: 2    allopurinol (ZYLOPRIM) 300 MG tablet, TAKE 1 TABLET BY MOUTH DAILY, Disp: 90 tablet, Rfl: 1    azelastine (ASTELIN) 0.1 % nasal spray, 1 spray by Nasal route 2 times daily Use in each nostril as directed, Disp: 2 each, Rfl: 1    aspirin 81 MG EC tablet, Take 1 tablet by mouth daily, Disp: 90 tablet, Rfl: 3    clopidogrel (PLAVIX) 75 MG tablet, Take 1 tablet by mouth daily, Disp: 90 tablet, Rfl: 3    lisinopril-hydroCHLOROthiazide (PRINZIDE;ZESTORETIC) 20-25 MG per tablet, Take 1 tablet by mouth daily, Disp: 90 tablet, Rfl: 3    metoprolol succinate (TOPROL XL) 25 MG extended release tablet, Take 0.5 tablets by mouth daily, Disp: 30 tablet, Rfl: 5    tamsulosin (FLOMAX) 0.4 MG capsule, TAKE ONE CAPSULE BY MOUTH DAILY, Disp: 90 capsule, Rfl: 1    Blood Pressure Monitoring (COMFORT TOUCH BP CUFF/MEDIUM) MISC, 1 Device by Does not apply route daily as needed (check BP daily), Disp: 1 each, Rfl: 0    hydrocortisone (LOCOID) 0.1 % OINT oitment, APPLY UP TO TWICE A DAY AS NEEDED FOR RASH, Disp: 45 g, Rfl: 0    hydrocortisone (ANUSOL-HC) 2.5

## 2024-03-18 ENCOUNTER — TELEPHONE (OUTPATIENT)
Dept: PULMONOLOGY | Age: 84
End: 2024-03-18

## 2024-03-18 NOTE — TELEPHONE ENCOUNTER
FYI - [facility] stated the [facility] stated they [facility] paperwork on March 6.  Looking in the chart for any scanned paperwork nothing was found.  Patient is going to follow up with the [facility] and get back with us.

## 2024-03-21 ENCOUNTER — OFFICE VISIT (OUTPATIENT)
Dept: FAMILY MEDICINE CLINIC | Age: 84
End: 2024-03-21
Payer: MEDICARE

## 2024-03-21 VITALS
HEART RATE: 76 BPM | TEMPERATURE: 97.7 F | WEIGHT: 191.8 LBS | SYSTOLIC BLOOD PRESSURE: 122 MMHG | HEIGHT: 67 IN | BODY MASS INDEX: 30.1 KG/M2 | DIASTOLIC BLOOD PRESSURE: 58 MMHG | OXYGEN SATURATION: 97 %

## 2024-03-21 DIAGNOSIS — I10 ESSENTIAL HYPERTENSION, BENIGN: Primary | ICD-10-CM

## 2024-03-21 DIAGNOSIS — R91.1 PULMONARY NODULE: ICD-10-CM

## 2024-03-21 DIAGNOSIS — E78.2 MIXED HYPERLIPIDEMIA: ICD-10-CM

## 2024-03-21 DIAGNOSIS — R35.1 NOCTURIA: ICD-10-CM

## 2024-03-21 PROCEDURE — 3074F SYST BP LT 130 MM HG: CPT | Performed by: NURSE PRACTITIONER

## 2024-03-21 PROCEDURE — 99214 OFFICE O/P EST MOD 30 MIN: CPT | Performed by: NURSE PRACTITIONER

## 2024-03-21 PROCEDURE — 3078F DIAST BP <80 MM HG: CPT | Performed by: NURSE PRACTITIONER

## 2024-03-21 PROCEDURE — 1123F ACP DISCUSS/DSCN MKR DOCD: CPT | Performed by: NURSE PRACTITIONER

## 2024-03-21 ASSESSMENT — ENCOUNTER SYMPTOMS
COUGH: 0
SHORTNESS OF BREATH: 0
ABDOMINAL PAIN: 0

## 2024-03-21 NOTE — PROGRESS NOTES
Praful Esposito (:  1940) is a 83 y.o. male,Established patient, here for evaluation of the following chief complaint(s):  Hyperlipidemia (F/u) and Hypertension (F/u)         ASSESSMENT/PLAN:  1. Essential hypertension, benign  Well controlle. No change to medication, due for labs  - Comprehensive Metabolic Panel; Future  - CBC; Future    2. Pulmonary nodule  Stable. F/u routinely with Pulm.    3. Mixed hyperlipidemia  Stable. LDL 69 HDL 55 3/2023, due for repeat labs  - Lipid, Fasting; Future     4. Nocturia  Stable. Continue fluid restriction at night, void prior to sleep. Continue flomax.     Return in about 6 months (around 2024) for yearly physical.         Subjective   SUBJECTIVE/OBJECTIVE:  HPI  Presents today for HTN and HDL f/u. 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     Cardiac-has f/u appt with Dr. [place]. S/p AOVR. Doing well, denies concerns. Taking all cardiac meds as prescribed.     Resp-follows with Dr. Galdamez, last f/u 2024 for lung nodule, believes it is from asbestosis exposure.     -nocturia, at least 2 times a night. Cutting back on fluids later in the evening to help, taking flomax as prescribed.     Current Outpatient Medications   Medication Sig Dispense Refill    ezetimibe-simvastatin (VYTORIN) 10-40 MG per tablet TAKE ONE TABLET BY MOUTH DAILY 90 tablet 2    betamethasone dipropionate 0.05 % lotion Apply topically 2 times daily Apply topically 2 times daily.      allopurinol (ZYLOPRIM) 300 MG tablet TAKE 1 TABLET BY MOUTH DAILY 90 tablet 1    aspirin 81 MG EC tablet Take 1 tablet by mouth daily 90 tablet 3    clopidogrel (PLAVIX) 75 MG tablet Take 1 tablet by mouth daily 90 tablet 3    lisinopril-hydroCHLOROthiazide (PRINZIDE;ZESTORETIC) 20-25 MG per tablet Take 1 tablet by mouth daily 90 tablet 3    metoprolol succinate (TOPROL XL) 25 MG extended release tablet Take 0.5 tablets by mouth daily 30 tablet 5    tamsulosin (FLOMAX) 0.4 MG capsule TAKE ONE

## 2024-04-15 DIAGNOSIS — R35.1 NOCTURIA: ICD-10-CM

## 2024-04-15 RX ORDER — TAMSULOSIN HYDROCHLORIDE 0.4 MG/1
0.4 CAPSULE ORAL DAILY
Qty: 90 CAPSULE | Refills: 1 | Status: SHIPPED | OUTPATIENT
Start: 2024-04-15

## 2024-04-16 ENCOUNTER — OFFICE VISIT (OUTPATIENT)
Dept: CARDIOLOGY CLINIC | Age: 84
End: 2024-04-16
Payer: MEDICARE

## 2024-04-16 VITALS
HEART RATE: 93 BPM | DIASTOLIC BLOOD PRESSURE: 70 MMHG | OXYGEN SATURATION: 96 % | HEIGHT: 67 IN | WEIGHT: 193 LBS | BODY MASS INDEX: 30.29 KG/M2 | SYSTOLIC BLOOD PRESSURE: 148 MMHG

## 2024-04-16 DIAGNOSIS — E78.5 HYPERLIPIDEMIA LDL GOAL <130: ICD-10-CM

## 2024-04-16 DIAGNOSIS — I10 ESSENTIAL HYPERTENSION: ICD-10-CM

## 2024-04-16 DIAGNOSIS — I77.810 ASCENDING AORTA DILATION (HCC): ICD-10-CM

## 2024-04-16 DIAGNOSIS — I35.0 NONRHEUMATIC AORTIC VALVE STENOSIS: Primary | ICD-10-CM

## 2024-04-16 PROCEDURE — 1123F ACP DISCUSS/DSCN MKR DOCD: CPT | Performed by: INTERNAL MEDICINE

## 2024-04-16 PROCEDURE — 99214 OFFICE O/P EST MOD 30 MIN: CPT | Performed by: INTERNAL MEDICINE

## 2024-04-16 PROCEDURE — 3078F DIAST BP <80 MM HG: CPT | Performed by: INTERNAL MEDICINE

## 2024-04-16 PROCEDURE — 3077F SYST BP >= 140 MM HG: CPT | Performed by: INTERNAL MEDICINE

## 2024-04-16 NOTE — PROGRESS NOTES
[facility]    Praful Esposito  1940    April 15, 2024    Reason for Consult: AS    CC: \"I'm feeling great.\"     HPI:  The patient is 83 y.o. male with a past medical history significant for hypertension, aortic stenosis and hyperlipidemia. He presented as referral from Dr. Cabrera regarding aortic stenosis. He has had echocardiograms beginning in 2015 to monitor his AS and dilated aortic root. He is s/p TAVR 9/26/2023.    He presents today for a follow up. He states overall he is doing well. He denies any new cardiac complaints and states he continues to remain active without any exertional symptoms. He denies any chest pains or worsening shortness of breath. He reports compliance with his medications and tolerating. He denies any abnormal bleeding or bruising. His main complaint is chronic shoulder pain and following with orthopedics. Patient denies exertional chest pain/pressure, dyspnea at rest, worsening MCINTOSH, PND, orthopnea, palpitations, lightheadedness, weight changes, changes in LE edema, and syncope.    Review of Systems:  Constitutional: No fatigue, weakness, night sweats or fever.   HEENT: No new vision difficulties or ringing in the ears.  Respiratory: No new SOB, PND, orthopnea or cough.   Cardiovascular: See HPI   GI: No n/v, diarrhea, constipation, abdominal pain or changes in bowel habits.  No melena, no hematochezia  : No urinary frequency, urgency, incontinence, hematuria or dysuria.  Skin: No cyanosis or skin lesions.  Musculoskeletal: No new muscle or joint pain.  Neurological: No syncope or TIA-like symptoms.  Psychiatric: No anxiety, insomnia or depression     Past Medical History:   Diagnosis Date    Abnormal breath sounds 12/3/2019    Aortic stenosis, mild 03/27/2018    Echo 3/30/18 - mild  with preserved EF    Calculus of gallbladder without cholecystitis without obstruction 12/27/2019    Colitis     Dermatitis 6/24/2019    Diverticulitis     Diverticulosis  
    Quit date:      Years since quittin.3     Passive exposure: Past    Smokeless tobacco: Never   Vaping Use    Vaping Use: Never used   Substance Use Topics    Alcohol use: Yes     Alcohol/week: 3.0 - 4.0 standard drinks of alcohol     Types: 3 - 4 Standard drinks or equivalent per week     Comment: every other day    Drug use: No     Allergies   Allergen Reactions    Guaifenesin & Derivatives      Nervous.    Metamucil [Psyllium]      Chest pain.    Norflex Tablets [Orphenadrine]      Urination issues.     Current Outpatient Medications   Medication Sig Dispense Refill    tamsulosin (FLOMAX) 0.4 MG capsule TAKE 1 CAPSULE BY MOUTH DAILY 90 capsule 1    ezetimibe-simvastatin (VYTORIN) 10-40 MG per tablet TAKE ONE TABLET BY MOUTH DAILY 90 tablet 2    betamethasone dipropionate 0.05 % lotion Apply topically 2 times daily Apply topically 2 times daily.      allopurinol (ZYLOPRIM) 300 MG tablet TAKE 1 TABLET BY MOUTH DAILY 90 tablet 1    aspirin 81 MG EC tablet Take 1 tablet by mouth daily 90 tablet 3    clopidogrel (PLAVIX) 75 MG tablet Take 1 tablet by mouth daily 90 tablet 3    lisinopril-hydroCHLOROthiazide (PRINZIDE;ZESTORETIC) 20-25 MG per tablet Take 1 tablet by mouth daily 90 tablet 3    metoprolol succinate (TOPROL XL) 25 MG extended release tablet Take 0.5 tablets by mouth daily 30 tablet 5    hydrocortisone (LOCOID) 0.1 % OINT oitment APPLY UP TO TWICE A DAY AS NEEDED FOR RASH 45 g 0    hydrocortisone (ANUSOL-HC) 2.5 % CREA rectal cream To be applied locally. 28 g 5    MAGNESIUM PO Take 250 mg by mouth       No current facility-administered medications for this visit.     Physical Exam:   BP (!) 148/70 (Site: Left Upper Arm, Position: Sitting, Cuff Size: Medium Adult)   Pulse 93   Ht 1.702 m (5' 7.01\")   Wt 87.5 kg (193 lb)   SpO2 96%   BMI 30.22 kg/m²   No intake or output data in the 24 hours ending 24 0952  Wt Readings from Last 2 Encounters:   24 87.5 kg (193 lb)   24 87

## 2024-05-28 RX ORDER — HYDROCORTISONE 25 MG/G
CREAM TOPICAL
Qty: 30 G | Refills: 1 | Status: SHIPPED | OUTPATIENT
Start: 2024-05-28

## 2024-06-12 ENCOUNTER — OFFICE VISIT (OUTPATIENT)
Dept: PULMONOLOGY | Age: 84
End: 2024-06-12
Payer: MEDICARE

## 2024-06-12 VITALS
RESPIRATION RATE: 16 BRPM | TEMPERATURE: 97.2 F | SYSTOLIC BLOOD PRESSURE: 110 MMHG | DIASTOLIC BLOOD PRESSURE: 70 MMHG | WEIGHT: 197.2 LBS | BODY MASS INDEX: 30.95 KG/M2 | OXYGEN SATURATION: 96 % | HEART RATE: 84 BPM | HEIGHT: 67 IN

## 2024-06-12 DIAGNOSIS — R91.8 PULMONARY NODULES: ICD-10-CM

## 2024-06-12 DIAGNOSIS — J61 ASBESTOSIS (HCC): Primary | ICD-10-CM

## 2024-06-12 DIAGNOSIS — R93.89 ABNORMAL CT OF THE CHEST: ICD-10-CM

## 2024-06-12 PROCEDURE — 3074F SYST BP LT 130 MM HG: CPT | Performed by: INTERNAL MEDICINE

## 2024-06-12 PROCEDURE — 3078F DIAST BP <80 MM HG: CPT | Performed by: INTERNAL MEDICINE

## 2024-06-12 PROCEDURE — 99213 OFFICE O/P EST LOW 20 MIN: CPT | Performed by: INTERNAL MEDICINE

## 2024-06-12 PROCEDURE — 1123F ACP DISCUSS/DSCN MKR DOCD: CPT | Performed by: INTERNAL MEDICINE

## 2024-06-12 NOTE — PROGRESS NOTES
Chief complaint  This is a 83 y.o. year old male  who comes to see me with a chief complaint of   Chief Complaint   Patient presents with    Follow-up     Lung nodules         HPI  Here with a cc of lung nodule, abnormal CT chest     He is doing ok.  No new changes.  He is still waiting to hear from the government about asbestos benefits.  He said he did CXR and spirometry again back in April at [facility]        Current Outpatient Medications:     hydrocortisone (ANUSOL-HC) 2.5 % CREA rectal cream, APPLY TO AFFECTED AREA(S) TWICE A DAY, Disp: 30 g, Rfl: 1    tamsulosin (FLOMAX) 0.4 MG capsule, TAKE 1 CAPSULE BY MOUTH DAILY, Disp: 90 capsule, Rfl: 1    ezetimibe-simvastatin (VYTORIN) 10-40 MG per tablet, TAKE ONE TABLET BY MOUTH DAILY, Disp: 90 tablet, Rfl: 2    betamethasone dipropionate 0.05 % lotion, Apply topically 2 times daily Apply topically 2 times daily., Disp: , Rfl:     allopurinol (ZYLOPRIM) 300 MG tablet, TAKE 1 TABLET BY MOUTH DAILY, Disp: 90 tablet, Rfl: 1    aspirin 81 MG EC tablet, Take 1 tablet by mouth daily, Disp: 90 tablet, Rfl: 3    lisinopril-hydroCHLOROthiazide (PRINZIDE;ZESTORETIC) 20-25 MG per tablet, Take 1 tablet by mouth daily, Disp: 90 tablet, Rfl: 3    metoprolol succinate (TOPROL XL) 25 MG extended release tablet, Take 0.5 tablets by mouth daily, Disp: 30 tablet, Rfl: 5    hydrocortisone (LOCOID) 0.1 % OINT oitment, APPLY UP TO TWICE A DAY AS NEEDED FOR RASH, Disp: 45 g, Rfl: 0    MAGNESIUM PO, Take 250 mg by mouth, Disp: , Rfl:       PHYSICAL EXAM:  Vitals:    06/12/24 0913   BP: 110/70   Pulse: 84   Resp: 16   Temp: 97.2 °F (36.2 °C)   SpO2: 96%       GENERAL:  Well nourished, alert, appears stated age, no distress  HEENT:  No scleral icterus, no conjunctival irritation  NECK:  No thyromegaly, no bruits  LYMPH:  No cervical or supraclavicular adenopathy  HEART:  Regular rate and rhythm, no murmur   LUNGS:  Faint crackles in bases   ABDOMEN:  No distention, no

## 2024-06-18 ENCOUNTER — TELEPHONE (OUTPATIENT)
Dept: CARDIOLOGY CLINIC | Age: 84
End: 2024-06-18

## 2024-06-18 DIAGNOSIS — Z95.2 HISTORY OF TRANSCATHETER AORTIC VALVE REPLACEMENT (TAVR): Primary | ICD-10-CM

## 2024-06-18 DIAGNOSIS — M10.9 GOUT OF FOOT, UNSPECIFIED CAUSE, UNSPECIFIED CHRONICITY, UNSPECIFIED LATERALITY: ICD-10-CM

## 2024-06-18 RX ORDER — ALLOPURINOL 300 MG/1
TABLET ORAL
Qty: 90 TABLET | Refills: 1 | Status: SHIPPED | OUTPATIENT
Start: 2024-06-18

## 2024-06-18 NOTE — TELEPHONE ENCOUNTER
Marsha, can you schedule pt for an echo and OV with Dr. Menendez in the [place] office (or where ever convenient) for his 1 year post TAVR check? THanks, Billy BAILEY

## 2024-06-18 NOTE — TELEPHONE ENCOUNTER
Talked with Allen he is scheduled for Echo on 9-25-24 at [facility] & OV with Dr. Menendez on 9-26-24 at [facility].

## 2024-06-27 ENCOUNTER — OFFICE VISIT (OUTPATIENT)
Dept: FAMILY MEDICINE CLINIC | Age: 84
End: 2024-06-27
Payer: MEDICARE

## 2024-06-27 VITALS
TEMPERATURE: 98.1 F | DIASTOLIC BLOOD PRESSURE: 68 MMHG | OXYGEN SATURATION: 97 % | HEART RATE: 82 BPM | SYSTOLIC BLOOD PRESSURE: 120 MMHG | WEIGHT: 195.6 LBS | BODY MASS INDEX: 30.7 KG/M2 | HEIGHT: 67 IN

## 2024-06-27 DIAGNOSIS — L03.90 CELLULITIS, UNSPECIFIED CELLULITIS SITE: Primary | ICD-10-CM

## 2024-06-27 PROCEDURE — 3078F DIAST BP <80 MM HG: CPT | Performed by: NURSE PRACTITIONER

## 2024-06-27 PROCEDURE — 99213 OFFICE O/P EST LOW 20 MIN: CPT | Performed by: NURSE PRACTITIONER

## 2024-06-27 PROCEDURE — 1123F ACP DISCUSS/DSCN MKR DOCD: CPT | Performed by: NURSE PRACTITIONER

## 2024-06-27 PROCEDURE — 3074F SYST BP LT 130 MM HG: CPT | Performed by: NURSE PRACTITIONER

## 2024-06-27 RX ORDER — CEPHALEXIN 500 MG/1
500 CAPSULE ORAL 2 TIMES DAILY
Qty: 14 CAPSULE | Refills: 0 | Status: SHIPPED | OUTPATIENT
Start: 2024-06-27 | End: 2024-07-04

## 2024-06-27 SDOH — ECONOMIC STABILITY: INCOME INSECURITY: HOW HARD IS IT FOR YOU TO PAY FOR THE VERY BASICS LIKE FOOD, HOUSING, MEDICAL CARE, AND HEATING?: NOT VERY HARD

## 2024-06-27 SDOH — ECONOMIC STABILITY: FOOD INSECURITY: WITHIN THE PAST 12 MONTHS, YOU WORRIED THAT YOUR FOOD WOULD RUN OUT BEFORE YOU GOT MONEY TO BUY MORE.: NEVER TRUE

## 2024-06-27 SDOH — ECONOMIC STABILITY: FOOD INSECURITY: WITHIN THE PAST 12 MONTHS, THE FOOD YOU BOUGHT JUST DIDN'T LAST AND YOU DIDN'T HAVE MONEY TO GET MORE.: NEVER TRUE

## 2024-06-27 ASSESSMENT — ENCOUNTER SYMPTOMS
ABDOMINAL PAIN: 0
NAUSEA: 0
VOMITING: 0

## 2024-06-27 NOTE — PROGRESS NOTES
Praful Esposito (:  1940) is a 83 y.o. male,Established patient, here for evaluation of the following chief complaint(s):  Insect Bite (Pt has red itchy bumps on his right thigh from last week when he was out watering the plants. 5 bumps. The itch is gone. Is swollen and reddish color now. Benadryl and cream helped and they were almost gone a few days ago and then came back. )      Assessment & Plan   ASSESSMENT/PLAN:  1. Cellulitis, unspecified cellulitis site  New right anterior thigh, follow pt education included  - cephALEXin (KEFLEX) 500 MG capsule; Take 1 capsule by mouth 2 times daily for 7 days  Dispense: 14 capsule; Refill: 0       Return if symptoms worsen or fail to improve.         Subjective   SUBJECTIVE/OBJECTIVE:  HPI  Chief Complaint   Patient presents with    Insect Bite     Pt has red itchy bumps on his right thigh from last week when he was out watering the plants. 5 bumps. The itch is gone. Is swollen and reddish color now. Benadryl and cream helped and they were almost gone a few days ago and then came back.    Ongoing redness and swelling, states the area is very tender. Denies fevers, abd pain, n/v.     Current Outpatient Medications   Medication Sig Dispense Refill    cephALEXin (KEFLEX) 500 MG capsule Take 1 capsule by mouth 2 times daily for 7 days 14 capsule 0    allopurinol (ZYLOPRIM) 300 MG tablet TAKE 1 TABLET BY MOUTH DAILY 90 tablet 1    hydrocortisone (ANUSOL-HC) 2.5 % CREA rectal cream APPLY TO AFFECTED AREA(S) TWICE A DAY 30 g 1    tamsulosin (FLOMAX) 0.4 MG capsule TAKE 1 CAPSULE BY MOUTH DAILY 90 capsule 1    ezetimibe-simvastatin (VYTORIN) 10-40 MG per tablet TAKE ONE TABLET BY MOUTH DAILY 90 tablet 2    aspirin 81 MG EC tablet Take 1 tablet by mouth daily 90 tablet 3    lisinopril-hydroCHLOROthiazide (PRINZIDE;ZESTORETIC) 20-25 MG per tablet Take 1 tablet by mouth daily 90 tablet 3    metoprolol succinate (TOPROL XL) 25 MG extended release tablet Take 0.5 tablets by

## 2024-07-03 ENCOUNTER — TELEPHONE (OUTPATIENT)
Dept: FAMILY MEDICINE CLINIC | Age: 84
End: 2024-07-03

## 2024-07-03 NOTE — TELEPHONE ENCOUNTER
Patient called the office today regarding bug bites that he had on his upper leg. Patient was seen in the office for this on 6-. Originally patient had 4 bites that were red, with red ring around it, and swollen. Patient was put on round of antibiotics.   Currently patient is on his last dose of antibiotics and was told to call the office for an update. Patient states the red bumps remain, but are smaller. The red ring and leg swelling is gone. Patient is not having any symptoms.   Please advise is you would like the patient to do another round of antibiotics or be seen due to him having history of heart issues.

## 2024-07-03 NOTE — TELEPHONE ENCOUNTER
As long as patient is not symptomatic and swelling is improving, we will continue to monitor, no need for additional antibiotics. Keep leg clean. Call if you develop worsening rash, fever, chills.

## 2024-07-17 ENCOUNTER — TELEPHONE (OUTPATIENT)
Dept: FAMILY MEDICINE CLINIC | Age: 84
End: 2024-07-17

## 2024-07-17 DIAGNOSIS — Z95.2 S/P TAVR (TRANSCATHETER AORTIC VALVE REPLACEMENT): ICD-10-CM

## 2024-07-17 NOTE — TELEPHONE ENCOUNTER
----- Message from Otoniel Bradley Carranza sent at 7/17/2024 12:24 PM EDT -----  Regarding: ECC Message to Provider  ECC Message to Provider    Relationship to Patient: Self     Additional Information: Patient is calling to let his PCP, Thuy Bonilla, MEGHAN - NP know that he has a dental appointment on 7/24/2024 and 7/31/2024, and patient would like to request for medication or antibiotics prior to his dental appointment.  --------------------------------------------------------------------------------------------------------------------------    Call Back Information: OK to leave message on voicemail  Preferred Call Back Number: Phone [phone]

## 2024-07-18 RX ORDER — AMOXICILLIN 500 MG/1
CAPSULE ORAL
Qty: 8 CAPSULE | Refills: 0 | Status: SHIPPED | OUTPATIENT
Start: 2024-07-18

## 2024-09-13 DIAGNOSIS — E78.2 MIXED HYPERLIPIDEMIA: ICD-10-CM

## 2024-09-13 DIAGNOSIS — I10 ESSENTIAL HYPERTENSION, BENIGN: ICD-10-CM

## 2024-09-13 LAB
ALBUMIN SERPL-MCNC: 4.4 G/DL (ref 3.4–5)
ALBUMIN/GLOB SERPL: 2.2 {RATIO} (ref 1.1–2.2)
ALP SERPL-CCNC: 102 U/L (ref 40–129)
ALT SERPL-CCNC: 50 U/L (ref 10–40)
ANION GAP SERPL CALCULATED.3IONS-SCNC: 12 MMOL/L (ref 3–16)
AST SERPL-CCNC: 43 U/L (ref 15–37)
BILIRUB SERPL-MCNC: 0.7 MG/DL (ref 0–1)
BUN SERPL-MCNC: 25 MG/DL (ref 7–20)
CALCIUM SERPL-MCNC: 9.3 MG/DL (ref 8.3–10.6)
CHLORIDE SERPL-SCNC: 103 MMOL/L (ref 99–110)
CHOLEST SERPL-MCNC: 136 MG/DL (ref 0–199)
CO2 SERPL-SCNC: 26 MMOL/L (ref 21–32)
CREAT SERPL-MCNC: 1.3 MG/DL (ref 0.8–1.3)
DEPRECATED RDW RBC AUTO: 14.6 % (ref 12.4–15.4)
GFR SERPLBLD CREATININE-BSD FMLA CKD-EPI: 55 ML/MIN/{1.73_M2}
GLUCOSE SERPL-MCNC: 90 MG/DL (ref 70–99)
HCT VFR BLD AUTO: 42.6 % (ref 40.5–52.5)
HDLC SERPL-MCNC: 37 MG/DL (ref 40–60)
HGB BLD-MCNC: 14.4 G/DL (ref 13.5–17.5)
LDL CHOLESTEROL: 68 MG/DL
MCH RBC QN AUTO: 33 PG (ref 26–34)
MCHC RBC AUTO-ENTMCNC: 33.7 G/DL (ref 31–36)
MCV RBC AUTO: 97.9 FL (ref 80–100)
PLATELET # BLD AUTO: 189 K/UL (ref 135–450)
PMV BLD AUTO: 8.9 FL (ref 5–10.5)
POTASSIUM SERPL-SCNC: 4 MMOL/L (ref 3.5–5.1)
PROT SERPL-MCNC: 6.4 G/DL (ref 6.4–8.2)
RBC # BLD AUTO: 4.35 M/UL (ref 4.2–5.9)
SODIUM SERPL-SCNC: 141 MMOL/L (ref 136–145)
TRIGL SERPL-MCNC: 153 MG/DL (ref 0–150)
VLDLC SERPL CALC-MCNC: 31 MG/DL
WBC # BLD AUTO: 5.8 K/UL (ref 4–11)

## 2024-09-20 ENCOUNTER — OFFICE VISIT (OUTPATIENT)
Dept: FAMILY MEDICINE CLINIC | Age: 84
End: 2024-09-20

## 2024-09-20 VITALS
BODY MASS INDEX: 30.61 KG/M2 | TEMPERATURE: 98.2 F | WEIGHT: 195 LBS | OXYGEN SATURATION: 96 % | HEART RATE: 71 BPM | SYSTOLIC BLOOD PRESSURE: 130 MMHG | HEIGHT: 67 IN | DIASTOLIC BLOOD PRESSURE: 60 MMHG

## 2024-09-20 DIAGNOSIS — Z00.00 MEDICARE ANNUAL WELLNESS VISIT, SUBSEQUENT: Primary | ICD-10-CM

## 2024-09-20 DIAGNOSIS — I10 ESSENTIAL HYPERTENSION, BENIGN: ICD-10-CM

## 2024-09-20 ASSESSMENT — LIFESTYLE VARIABLES
HOW OFTEN DO YOU HAVE A DRINK CONTAINING ALCOHOL: 4 OR MORE TIMES A WEEK
HOW OFTEN DURING THE LAST YEAR HAVE YOU NEEDED AN ALCOHOLIC DRINK FIRST THING IN THE MORNING TO GET YOURSELF GOING AFTER A NIGHT OF HEAVY DRINKING: NEVER
HAS A RELATIVE, FRIEND, DOCTOR, OR ANOTHER HEALTH PROFESSIONAL EXPRESSED CONCERN ABOUT YOUR DRINKING OR SUGGESTED YOU CUT DOWN: NO
HOW OFTEN DURING THE LAST YEAR HAVE YOU FOUND THAT YOU WERE NOT ABLE TO STOP DRINKING ONCE YOU HAD STARTED: NEVER
HOW OFTEN DURING THE LAST YEAR HAVE YOU FAILED TO DO WHAT WAS NORMALLY EXPECTED FROM YOU BECAUSE OF DRINKING: NEVER
HAVE YOU OR SOMEONE ELSE BEEN INJURED AS A RESULT OF YOUR DRINKING: NO
HOW OFTEN DURING THE LAST YEAR HAVE YOU HAD A FEELING OF GUILT OR REMORSE AFTER DRINKING: NEVER
HOW MANY STANDARD DRINKS CONTAINING ALCOHOL DO YOU HAVE ON A TYPICAL DAY: 1 OR 2
HOW OFTEN DURING THE LAST YEAR HAVE YOU BEEN UNABLE TO REMEMBER WHAT HAPPENED THE NIGHT BEFORE BECAUSE YOU HAD BEEN DRINKING: NEVER

## 2024-09-20 ASSESSMENT — PATIENT HEALTH QUESTIONNAIRE - PHQ9
SUM OF ALL RESPONSES TO PHQ QUESTIONS 1-9: 0
2. FEELING DOWN, DEPRESSED OR HOPELESS: NOT AT ALL
SUM OF ALL RESPONSES TO PHQ9 QUESTIONS 1 & 2: 0
1. LITTLE INTEREST OR PLEASURE IN DOING THINGS: NOT AT ALL

## 2024-09-25 ENCOUNTER — TELEPHONE (OUTPATIENT)
Dept: CARDIOLOGY CLINIC | Age: 84
End: 2024-09-25

## 2024-09-30 ENCOUNTER — HOSPITAL ENCOUNTER (OUTPATIENT)
Dept: CARDIOLOGY | Age: 84
Discharge: HOME | End: 2024-10-02
Payer: MEDICARE

## 2024-09-30 VITALS
BODY MASS INDEX: 30.61 KG/M2 | HEIGHT: 67 IN | WEIGHT: 195 LBS | SYSTOLIC BLOOD PRESSURE: 138 MMHG | DIASTOLIC BLOOD PRESSURE: 66 MMHG

## 2024-09-30 DIAGNOSIS — Z95.2 HISTORY OF TRANSCATHETER AORTIC VALVE REPLACEMENT (TAVR): ICD-10-CM

## 2024-09-30 LAB
ECHO AO ASC DIAM: 3.7 CM
ECHO AO ASCENDING AORTA INDEX: 1.85 CM/M2
ECHO AO ROOT DIAM: 2.7 CM
ECHO AO ROOT INDEX: 1.35 CM/M2
ECHO AV AREA PEAK VELOCITY: 1.9 CM2
ECHO AV AREA VTI: 2.1 CM2
ECHO AV AREA/BSA PEAK VELOCITY: 1 CM2/M2
ECHO AV AREA/BSA VTI: 1.1 CM2/M2
ECHO AV EFFECTIVE ORIFICE AREA (EOA) INDEX: 1.1 CM2/M2
ECHO AV EFFECTIVE ORIFICE AREA (EOA): 2.1 CM2
ECHO AV MEAN GRADIENT: 10 MMHG
ECHO AV MEAN VELOCITY: 1.5 M/S
ECHO AV PEAK GRADIENT: 19 MMHG
ECHO AV PEAK VELOCITY: 2.2 M/S
ECHO AV VELOCITY RATIO: 0.59
ECHO AV VTI: 43.2 CM
ECHO BSA: 2.04 M2
ECHO EST RA PRESSURE: 3 MMHG
ECHO LA AREA 2C: 20.8 CM2
ECHO LA AREA 4C: 20.7 CM2
ECHO LA DIAMETER INDEX: 2 CM/M2
ECHO LA DIAMETER: 4 CM
ECHO LA MAJOR AXIS: 6.3 CM
ECHO LA MINOR AXIS: 6.2 CM
ECHO LA TO AORTIC ROOT RATIO: 1.48
ECHO LA VOL BP: 55 ML (ref 18–58)
ECHO LA VOL MOD A2C: 54 ML (ref 18–58)
ECHO LA VOL MOD A4C: 55 ML (ref 18–58)
ECHO LA VOL/BSA BIPLANE: 28 ML/M2 (ref 16–34)
ECHO LA VOLUME INDEX MOD A2C: 27 ML/M2 (ref 16–34)
ECHO LA VOLUME INDEX MOD A4C: 28 ML/M2 (ref 16–34)
ECHO LV E' LATERAL VELOCITY: 8.9 CM/S
ECHO LV E' SEPTAL VELOCITY: 7.1 CM/S
ECHO LV EDV A2C: 73 ML
ECHO LV EDV A4C: 80 ML
ECHO LV EDV INDEX A4C: 40 ML/M2
ECHO LV EDV NDEX A2C: 37 ML/M2
ECHO LV EF PHYSICIAN: 63 %
ECHO LV EJECTION FRACTION A2C: 61 %
ECHO LV EJECTION FRACTION A4C: 68 %
ECHO LV EJECTION FRACTION BIPLANE: 65 % (ref 55–100)
ECHO LV ESV A2C: 29 ML
ECHO LV ESV A4C: 25 ML
ECHO LV ESV INDEX A2C: 15 ML/M2
ECHO LV ESV INDEX A4C: 13 ML/M2
ECHO LV FRACTIONAL SHORTENING: 29 % (ref 28–44)
ECHO LV GLOBAL LONGITUDINAL STRAIN (GLS): -19.6 %
ECHO LV INTERNAL DIMENSION DIASTOLE INDEX: 1.9 CM/M2
ECHO LV INTERNAL DIMENSION DIASTOLIC: 3.8 CM (ref 4.2–5.9)
ECHO LV INTERNAL DIMENSION SYSTOLIC INDEX: 1.35 CM/M2
ECHO LV INTERNAL DIMENSION SYSTOLIC: 2.7 CM
ECHO LV IVSD: 1 CM (ref 0.6–1)
ECHO LV MASS 2D: 101.1 G (ref 88–224)
ECHO LV MASS INDEX 2D: 50.5 G/M2 (ref 49–115)
ECHO LV POSTERIOR WALL DIASTOLIC: 0.8 CM (ref 0.6–1)
ECHO LV RELATIVE WALL THICKNESS RATIO: 0.42
ECHO LVOT AREA: 3.1 CM2
ECHO LVOT AV VTI INDEX: 0.68
ECHO LVOT DIAM: 2 CM
ECHO LVOT MEAN GRADIENT: 3 MMHG
ECHO LVOT PEAK GRADIENT: 6 MMHG
ECHO LVOT PEAK VELOCITY: 1.3 M/S
ECHO LVOT STROKE VOLUME INDEX: 46.2 ML/M2
ECHO LVOT SV: 92.3 ML
ECHO LVOT VTI: 29.4 CM
ECHO MV A VELOCITY: 1.23 M/S
ECHO MV AREA VTI: 2.9 CM2
ECHO MV E VELOCITY: 0.77 M/S
ECHO MV E/A RATIO: 0.63
ECHO MV E/E' LATERAL: 8.65
ECHO MV E/E' RATIO (AVERAGED): 9.75
ECHO MV E/E' SEPTAL: 10.85
ECHO MV LVOT VTI INDEX: 1.07
ECHO MV MAX VELOCITY: 1.3 M/S
ECHO MV MEAN GRADIENT: 3 MMHG
ECHO MV MEAN VELOCITY: 0.7 M/S
ECHO MV PEAK GRADIENT: 7 MMHG
ECHO MV VTI: 31.4 CM
ECHO RA AREA 4C: 11.7 CM2
ECHO RA END SYSTOLIC VOLUME APICAL 4 CHAMBER INDEX BSA: 11 ML/M2
ECHO RA VOLUME: 21 ML
ECHO RIGHT VENTRICULAR SYSTOLIC PRESSURE (RVSP): 20 MMHG
ECHO RV BASAL DIMENSION: 3.1 CM
ECHO RV FREE WALL PEAK S': 16.9 CM/S
ECHO RV LONGITUDINAL DIMENSION: 6.5 CM
ECHO RV MID DIMENSION: 2.3 CM
ECHO RV TAPSE: 2 CM (ref 1.7–?)
ECHO TV REGURGITANT MAX VELOCITY: 2.05 M/S
ECHO TV REGURGITANT PEAK GRADIENT: 17 MMHG

## 2024-09-30 PROCEDURE — 93306 TTE W/DOPPLER COMPLETE: CPT | Performed by: INTERNAL MEDICINE

## 2024-09-30 PROCEDURE — 93356 MYOCRD STRAIN IMG SPCKL TRCK: CPT | Performed by: INTERNAL MEDICINE

## 2024-09-30 PROCEDURE — 93306 TTE W/DOPPLER COMPLETE: CPT

## 2024-10-13 DIAGNOSIS — R35.1 NOCTURIA: ICD-10-CM

## 2024-10-13 DIAGNOSIS — I35.0 NONRHEUMATIC AORTIC VALVE STENOSIS: ICD-10-CM

## 2024-10-14 RX ORDER — TAMSULOSIN HYDROCHLORIDE 0.4 MG/1
0.4 CAPSULE ORAL DAILY
Qty: 90 CAPSULE | Refills: 1 | Status: SHIPPED | OUTPATIENT
Start: 2024-10-14

## 2024-10-14 RX ORDER — LISINOPRIL AND HYDROCHLOROTHIAZIDE 20; 25 MG/1; MG/1
1 TABLET ORAL DAILY
Qty: 90 TABLET | Refills: 3 | Status: SHIPPED | OUTPATIENT
Start: 2024-10-14

## 2024-10-14 RX ORDER — HYDROCORTISONE BUTYRATE 1 MG/G
OINTMENT TOPICAL
Qty: 45 G | Refills: 0 | Status: SHIPPED | OUTPATIENT
Start: 2024-10-14

## 2024-10-14 NOTE — TELEPHONE ENCOUNTER
Last OV: 4/16/24  Next OV: 11/14/24  Last refill: 10/19/23 #90 3 R/f  Most recent Labs: 9/13/24  Last EKG (if needed):

## 2024-11-04 DIAGNOSIS — I35.0 NONRHEUMATIC AORTIC VALVE STENOSIS: ICD-10-CM

## 2024-11-05 RX ORDER — METOPROLOL SUCCINATE 25 MG/1
12.5 TABLET, EXTENDED RELEASE ORAL DAILY
Qty: 30 TABLET | Refills: 5 | Status: SHIPPED | OUTPATIENT
Start: 2024-11-05

## 2024-11-05 NOTE — TELEPHONE ENCOUNTER
Last OV: 24 - Shon                  10/19/23 - Arturo  Next OV: 24 - Shon  Last Labs: 24   Last EK23    Last Filled:     Disp Refills Start End     metoprolol succinate (TOPROL XL) 25 MG extended release tablet 30 tablet 5 10/19/2023 --    Sig - Route: Take 0.5 tablets by mouth daily - Oral    Sent to pharmacy as: Metoprolol Succinate ER 25 MG Oral Tablet Extended Release 24 Hour (TOPROL XL)    Cosign for Ordering: Accepted by Hansel Morris MD on 10/19/2023  3:45 PM    E-Prescribing Status: Receipt confirmed by pharmacy (10/19/2023  2:53 PM EDT)

## 2024-11-13 NOTE — PROGRESS NOTES
[facility]    Praful Esposito  1940    November 14, 2024    Reason for Consult: AS    CC: \" I feel good\"    HPI:  The patient is 84 y.o. male with a past medical history significant for hypertension, aortic stenosis and hyperlipidemia. He presented as referral from Dr. Cabrera regarding aortic stenosis. He has had echocardiograms beginning in 2015 to monitor his AS and dilated aortic root. He is s/p TAVR 9/26/2023.    Today, he presents to office for follow up and overall he is feeling well.  He is not participating in any formal exercise.  He denies any chest pain or shortness of breath.  He says that overall he feels pretty well for 84 years old.  No awareness of his heart beating rapidly.      Review of Systems:  Constitutional: No fatigue, weakness, night sweats or fever.   HEENT: No new vision difficulties or ringing in the ears.  Respiratory: No new SOB, PND, orthopnea or cough.   Cardiovascular: See HPI   GI: No n/v, diarrhea, constipation, abdominal pain or changes in bowel habits.  No melena, no hematochezia  : No urinary frequency, urgency, incontinence, hematuria or dysuria.  Skin: No cyanosis or skin lesions.  Musculoskeletal: No new muscle or joint pain.  Neurological: No syncope or TIA-like symptoms.  Psychiatric: No anxiety, insomnia or depression     Past Medical History:   Diagnosis Date    Abnormal breath sounds 12/3/2019    Aortic stenosis, mild 03/27/2018    Echo 3/30/18 - mild  with preserved EF    Calculus of gallbladder without cholecystitis without obstruction 12/27/2019    Colitis     Dermatitis 6/24/2019    Diverticulitis     Diverticulosis     Elevated liver enzymes 11/19/2018    Essential hypertension, benign     Gout     Hyperlipidemia     Malignant melanoma of neck (HCC)     Malignant melanoma of neck (HCC) 9/24/2013    Obesity, Class I, BMI 30.0-34.9 (see actual BMI) 6/24/2019    Overweight with body mass index (BMI) 25.0-29.9 6/24/2019    Prostatism

## 2024-11-14 ENCOUNTER — OFFICE VISIT (OUTPATIENT)
Dept: CARDIOLOGY CLINIC | Age: 84
End: 2024-11-14

## 2024-11-14 VITALS
HEART RATE: 78 BPM | OXYGEN SATURATION: 97 % | WEIGHT: 202 LBS | BODY MASS INDEX: 31.71 KG/M2 | SYSTOLIC BLOOD PRESSURE: 128 MMHG | DIASTOLIC BLOOD PRESSURE: 70 MMHG | HEIGHT: 67 IN

## 2024-11-14 DIAGNOSIS — I35.0 NONRHEUMATIC AORTIC VALVE STENOSIS: Primary | ICD-10-CM

## 2024-11-14 DIAGNOSIS — I77.810 ASCENDING AORTA DILATION (HCC): ICD-10-CM

## 2024-11-14 DIAGNOSIS — E78.5 HYPERLIPIDEMIA LDL GOAL <130: ICD-10-CM

## 2024-11-14 DIAGNOSIS — I10 ESSENTIAL HYPERTENSION: ICD-10-CM

## 2024-11-22 RX ORDER — EZETIMIBE AND SIMVASTATIN 10; 40 MG/1; MG/1
TABLET ORAL
Qty: 90 TABLET | Refills: 2 | Status: SHIPPED | OUTPATIENT
Start: 2024-11-22

## 2024-12-02 ENCOUNTER — TELEPHONE (OUTPATIENT)
Dept: PULMONOLOGY | Age: 84
End: 2024-12-02

## 2024-12-02 NOTE — TELEPHONE ENCOUNTER
Allen called in asking if he needs a CT before his appt in 2 weeks.  Advised him one was not ordered on the last OV note.  If Dr Galdamez wants him to have another one he will order it at his OV 12/16

## 2024-12-04 NOTE — PROCEDURES
[facility]   Procedure Note    CLINICAL HISTORY AND INDICATIONS:       Tegan Uribe is a 82 y.o. male with a history of aortic stenosis. [id number]}. INFORMED CONSENT:      Informed consent was obtained from the patient and the family members. I explained to them risks and benefits of the procedure. I explained to them we will do this from either the common femoral artery access or radial access. I explained to the patient that we will use lidocaine for local anaesthesia and moderate sedation. I explained to them any risk of perforation of the vessel, stroke, heart attack, bleeding, death and myocardial infarction during the procedure. The patient understood the risks and benefits and gave informed consent and would like to go ahead with the procedure. Patient agreed to use dual antiplatelet therapy. PROCEDURES PERFORMED:     20    PROCEDURE TECHNIQUE:          Femoral Access: Using modified Seldinger technique we approached common femoral artery using 6 Fr sheath access  Diagnostic coronary angiogram performed using a JL4 engaging left coronary and JR4 catheter engaging right coronary artery and performing angiogram. An angled pig tail catheter for LV gram     COMPLICATIONS:  None. At the end of the procedure a radial band device was used for hemostasis. EBL: 20 cc    Moderate Sedation:    ASA 2  Mallampati 2      An independent trained observer pushed medications at my direction. We monitored the patient's level of consciousness and vital signs/physiologic status throughout the procedure duration (see start and start times above). ANGIOGRAM/CORONARY ARTERIOGRAM:         Right or Left dominant system    1. The left main coronary artery arising normal fashion from the left coronary cusp giving rise to the left anterior descending artery and the left circumflex artery. There is RIGO 3 flow. 0% STENOSIS     2.   The right coronary artery arises from right coronary Detail Level: Detailed Quality 110: Preventive Care And Screening: Influenza Immunization: Influenza Immunization previously received during influenza season Quality 130: Documentation Of Current Medications In The Medical Record: Current Medications Documented Quality 431: Preventive Care And Screening: Unhealthy Alcohol Use - Screening: Patient not identified as an unhealthy alcohol user when screened for unhealthy alcohol use using a systematic screening method Quality 402: Tobacco Use And Help With Quitting Among Adolescents: Patient screened for tobacco and never smoked

## 2024-12-16 ENCOUNTER — OFFICE VISIT (OUTPATIENT)
Dept: PULMONOLOGY | Age: 84
End: 2024-12-16
Payer: MEDICARE

## 2024-12-16 VITALS
RESPIRATION RATE: 18 BRPM | HEIGHT: 67 IN | OXYGEN SATURATION: 95 % | SYSTOLIC BLOOD PRESSURE: 156 MMHG | DIASTOLIC BLOOD PRESSURE: 81 MMHG | HEART RATE: 72 BPM | WEIGHT: 204.8 LBS | BODY MASS INDEX: 32.15 KG/M2 | TEMPERATURE: 97.9 F

## 2024-12-16 DIAGNOSIS — J61 ASBESTOSIS (HCC): Primary | ICD-10-CM

## 2024-12-16 DIAGNOSIS — M10.9 GOUT OF FOOT, UNSPECIFIED CAUSE, UNSPECIFIED CHRONICITY, UNSPECIFIED LATERALITY: ICD-10-CM

## 2024-12-16 DIAGNOSIS — R91.8 PULMONARY NODULES: ICD-10-CM

## 2024-12-16 PROCEDURE — G8427 DOCREV CUR MEDS BY ELIG CLIN: HCPCS | Performed by: INTERNAL MEDICINE

## 2024-12-16 PROCEDURE — 1123F ACP DISCUSS/DSCN MKR DOCD: CPT | Performed by: INTERNAL MEDICINE

## 2024-12-16 PROCEDURE — 99214 OFFICE O/P EST MOD 30 MIN: CPT | Performed by: INTERNAL MEDICINE

## 2024-12-16 PROCEDURE — G8484 FLU IMMUNIZE NO ADMIN: HCPCS | Performed by: INTERNAL MEDICINE

## 2024-12-16 PROCEDURE — G2211 COMPLEX E/M VISIT ADD ON: HCPCS | Performed by: INTERNAL MEDICINE

## 2024-12-16 PROCEDURE — 1036F TOBACCO NON-USER: CPT | Performed by: INTERNAL MEDICINE

## 2024-12-16 PROCEDURE — 1159F MED LIST DOCD IN RCRD: CPT | Performed by: INTERNAL MEDICINE

## 2024-12-16 PROCEDURE — 3079F DIAST BP 80-89 MM HG: CPT | Performed by: INTERNAL MEDICINE

## 2024-12-16 PROCEDURE — G8417 CALC BMI ABV UP PARAM F/U: HCPCS | Performed by: INTERNAL MEDICINE

## 2024-12-16 PROCEDURE — 3077F SYST BP >= 140 MM HG: CPT | Performed by: INTERNAL MEDICINE

## 2024-12-16 RX ORDER — ALLOPURINOL 300 MG/1
TABLET ORAL
Qty: 90 TABLET | Refills: 1 | Status: SHIPPED | OUTPATIENT
Start: 2024-12-16

## 2024-12-16 NOTE — PROGRESS NOTES
Chief complaint  This is a 84 y.o. year old male  who comes to see me with a chief complaint of   Chief Complaint   Patient presents with    Follow-up    Asbestosis         HPI  Here with a cc of asbestosis     He is doing ok.  IT turns out he cannot get any monetary award from the government as his lung function is not impaired enough from asbestos.  He mentions something about an aneurysm that Dr Menendez told him about.  I don't see any mention of one on last 2 scans.  He is wondering if it is time for another CT.  Symptoms are about the same as before         Current Outpatient Medications:     allopurinol (ZYLOPRIM) 300 MG tablet, TAKE 1 TABLET BY MOUTH DAILY, Disp: 90 tablet, Rfl: 1    ezetimibe-simvastatin (VYTORIN) 10-40 MG per tablet, TAKE 1 TABLET BY MOUTH DAILY, Disp: 90 tablet, Rfl: 2    metoprolol succinate (TOPROL XL) 25 MG extended release tablet, TAKE 1/2 TABLET BY MOUTH DAILY, Disp: 30 tablet, Rfl: 5    hydrocortisone (LOCOID) 0.1 % OINT oitment, APPLY UP TO TWO TIMES A DAY AS NEEDED FOR RASH, Disp: 45 g, Rfl: 0    lisinopril-hydroCHLOROthiazide (PRINZIDE;ZESTORETIC) 20-25 MG per tablet, TAKE 1 TABLET BY MOUTH DAILY, Disp: 90 tablet, Rfl: 3    tamsulosin (FLOMAX) 0.4 MG capsule, TAKE 1 CAPSULE BY MOUTH DAILY, Disp: 90 capsule, Rfl: 1    Multiple Vitamins-Minerals (MULTI FOR HIM 50+ PO), Take by mouth, Disp: , Rfl:     hydrocortisone (ANUSOL-HC) 2.5 % CREA rectal cream, APPLY TO AFFECTED AREA(S) TWICE A DAY, Disp: 30 g, Rfl: 1    aspirin 81 MG EC tablet, Take 1 tablet by mouth daily, Disp: 90 tablet, Rfl: 3    MAGNESIUM PO, Take 250 mg by mouth, Disp: , Rfl:       PHYSICAL EXAM:  Vitals:    12/16/24 0938   BP: (!) 156/81   Pulse: 72   Resp: 18   Temp: 97.9 °F (36.6 °C)   SpO2: 95%         GENERAL:  Well nourished, alert, appears stated age, no distress  HEENT:  No scleral icterus, no conjunctival irritation  NECK:  No thyromegaly, no bruits  LYMPH:  No cervical or supraclavicular adenopathy  HEART:

## 2025-01-08 ENCOUNTER — TELEPHONE (OUTPATIENT)
Dept: FAMILY MEDICINE CLINIC | Age: 85
End: 2025-01-08

## 2025-01-17 RX ORDER — HYDROCORTISONE BUTYRATE 1 MG/G
OINTMENT TOPICAL
Qty: 45 G | Refills: 0 | Status: SHIPPED | OUTPATIENT
Start: 2025-01-17

## 2025-01-22 RX ORDER — HYDROCORTISONE 25 MG/G
CREAM TOPICAL
Qty: 30 G | Refills: 1 | Status: SHIPPED | OUTPATIENT
Start: 2025-01-22

## 2025-01-28 ENCOUNTER — PATIENT MESSAGE (OUTPATIENT)
Dept: FAMILY MEDICINE CLINIC | Age: 85
End: 2025-01-28

## 2025-02-03 ENCOUNTER — PATIENT MESSAGE (OUTPATIENT)
Dept: FAMILY MEDICINE CLINIC | Age: 85
End: 2025-02-03

## 2025-02-03 ENCOUNTER — TELEPHONE (OUTPATIENT)
Dept: FAMILY MEDICINE CLINIC | Age: 85
End: 2025-02-03

## 2025-02-03 RX ORDER — AMOXICILLIN 500 MG/1
2000 CAPSULE ORAL ONCE
Qty: 4 CAPSULE | Refills: 0 | Status: SHIPPED | OUTPATIENT
Start: 2025-02-03 | End: 2025-02-03

## 2025-02-03 NOTE — TELEPHONE ENCOUNTER
Patient called in requesting an antibiotic be called in because he is having a dental procedure on 02/19/25.     Please advise.

## 2025-02-06 ENCOUNTER — TELEPHONE (OUTPATIENT)
Dept: PULMONOLOGY | Age: 85
End: 2025-02-06

## 2025-02-06 DIAGNOSIS — J40 BRONCHITIS: Primary | ICD-10-CM

## 2025-02-06 RX ORDER — AZITHROMYCIN 250 MG/1
250 TABLET, FILM COATED ORAL DAILY
Qty: 6 TABLET | Refills: 0 | Status: SHIPPED | OUTPATIENT
Start: 2025-02-06

## 2025-02-06 NOTE — TELEPHONE ENCOUNTER
Patient called and stated he is having SOB, when he breaths deep he feels a tenderness in his lung.  Coughing up mucus gray and brown  No fever  Musinex is giving him diarrhea      Was just seen in Dec, 2024     [facility] [id number] - [address]  - P [phone] - F [phone], [place] [address]  Phone: [phone]  Fax: [phone]

## 2025-02-06 NOTE — TELEPHONE ENCOUNTER
Patient called and stated he is   SOB  Wjhen he breaths deep he feels a tenderness in his lung   Coughing up mucus gray and brown  No fever  Musinex is giving him diarrhea     Was just seen in Dec, 2024  HE wanted to be seen but no APPT.   Patient stated this has been going on since last month or so.    Pharmacy   Should patient get some antibiotic or steroid   Please call patient     [facility] [id number] - [place]  - P [phone] - F [phone]  [address] [address]  Phone: [phone]  Fax: [phone]

## 2025-02-13 NOTE — PROGRESS NOTES
BRIDGER patient was scheduled by this employee at call center no documentation placed.   
Called and left detailed message in regards to appt on the 20th of February. Patient appt is scheduled for 11:15 on the 20th but needs to be moved to 11:30 am per clinical.     When patient calls back please confirm change for patient and move appt to the 11:30 slot per clinical.   
LVM to reschedule appointment     Since this is a follow up, ok to book with Adilene.   OR put her on same day with Dr Javed at 9am if still open.        
Since this is a follow up, ok to book with Adilene.   OR put her on same day with Dr Javed at 9am if still open.     
should be limited until full range of motion and comfort have been regained. I have explained the time course and likely expectations for maximal recovery of motion and function. He will follow up with me in 3 - 4 weeks. We will continue to monitor his bilateral hand and arm complaints. If he continues to have this radiating pain, we will need to consider further work-up of his neck.     Orders Placed This Encounter   Procedures    XR SHOULDER RIGHT (MIN 2 VIEWS)     Standing Status:   Future     Number of Occurrences:   1     Standing Expiration Date:   4/19/2024     Scheduling Instructions:       24      2 view- MARIELY Casas     Order Specific Question:   Reason for exam:     Answer:   pain    [facility]     Referral Priority:   Routine     Referral Type:   Eval and Treat     Referral Reason:   Specialty Services Required     Requested Specialty:   Physical Therapist     Number of Visits Requested:   1

## 2025-02-18 ENCOUNTER — OFFICE VISIT (OUTPATIENT)
Dept: FAMILY MEDICINE CLINIC | Age: 85
End: 2025-02-18
Payer: MEDICARE

## 2025-02-18 VITALS
HEART RATE: 89 BPM | SYSTOLIC BLOOD PRESSURE: 128 MMHG | WEIGHT: 202.4 LBS | DIASTOLIC BLOOD PRESSURE: 68 MMHG | OXYGEN SATURATION: 95 % | BODY MASS INDEX: 31.77 KG/M2 | HEIGHT: 67 IN | TEMPERATURE: 98.2 F

## 2025-02-18 DIAGNOSIS — R05.8 COUGH WITH SPUTUM: Primary | ICD-10-CM

## 2025-02-18 DIAGNOSIS — J61 ASBESTOSIS (HCC): ICD-10-CM

## 2025-02-18 PROCEDURE — 3074F SYST BP LT 130 MM HG: CPT | Performed by: NURSE PRACTITIONER

## 2025-02-18 PROCEDURE — 1123F ACP DISCUSS/DSCN MKR DOCD: CPT | Performed by: NURSE PRACTITIONER

## 2025-02-18 PROCEDURE — G8417 CALC BMI ABV UP PARAM F/U: HCPCS | Performed by: NURSE PRACTITIONER

## 2025-02-18 PROCEDURE — G8427 DOCREV CUR MEDS BY ELIG CLIN: HCPCS | Performed by: NURSE PRACTITIONER

## 2025-02-18 PROCEDURE — 1036F TOBACCO NON-USER: CPT | Performed by: NURSE PRACTITIONER

## 2025-02-18 PROCEDURE — 99213 OFFICE O/P EST LOW 20 MIN: CPT | Performed by: NURSE PRACTITIONER

## 2025-02-18 PROCEDURE — 1159F MED LIST DOCD IN RCRD: CPT | Performed by: NURSE PRACTITIONER

## 2025-02-18 PROCEDURE — 3078F DIAST BP <80 MM HG: CPT | Performed by: NURSE PRACTITIONER

## 2025-02-18 RX ORDER — BENZONATATE 200 MG/1
200 CAPSULE ORAL 3 TIMES DAILY PRN
Qty: 30 CAPSULE | Refills: 0 | Status: SHIPPED | OUTPATIENT
Start: 2025-02-18 | End: 2025-02-28

## 2025-02-18 RX ORDER — IPRATROPIUM BROMIDE 42 UG/1
2 SPRAY, METERED NASAL 2 TIMES DAILY
Qty: 15 ML | Refills: 0 | Status: SHIPPED | OUTPATIENT
Start: 2025-02-18

## 2025-02-18 RX ORDER — METHYLPREDNISOLONE 4 MG/1
TABLET ORAL
Qty: 1 KIT | Refills: 0 | Status: SHIPPED | OUTPATIENT
Start: 2025-02-18 | End: 2025-02-24

## 2025-02-18 RX ORDER — HYDROCORTISONE 25 MG/G
CREAM TOPICAL 2 TIMES DAILY
COMMUNITY

## 2025-02-18 SDOH — ECONOMIC STABILITY: FOOD INSECURITY: WITHIN THE PAST 12 MONTHS, THE FOOD YOU BOUGHT JUST DIDN'T LAST AND YOU DIDN'T HAVE MONEY TO GET MORE.: NEVER TRUE

## 2025-02-18 SDOH — ECONOMIC STABILITY: FOOD INSECURITY: WITHIN THE PAST 12 MONTHS, YOU WORRIED THAT YOUR FOOD WOULD RUN OUT BEFORE YOU GOT MONEY TO BUY MORE.: NEVER TRUE

## 2025-02-18 ASSESSMENT — PATIENT HEALTH QUESTIONNAIRE - PHQ9
SUM OF ALL RESPONSES TO PHQ QUESTIONS 1-9: 0
SUM OF ALL RESPONSES TO PHQ QUESTIONS 1-9: 0
1. LITTLE INTEREST OR PLEASURE IN DOING THINGS: NOT AT ALL
SUM OF ALL RESPONSES TO PHQ QUESTIONS 1-9: 0
SUM OF ALL RESPONSES TO PHQ9 QUESTIONS 1 & 2: 0
2. FEELING DOWN, DEPRESSED OR HOPELESS: NOT AT ALL
SUM OF ALL RESPONSES TO PHQ QUESTIONS 1-9: 0

## 2025-02-18 ASSESSMENT — ENCOUNTER SYMPTOMS
RHINORRHEA: 1
WHEEZING: 1
COUGH: 1
SHORTNESS OF BREATH: 1
CHEST TIGHTNESS: 1
ABDOMINAL PAIN: 0

## 2025-02-18 NOTE — PROGRESS NOTES
congestion, postnasal drip and rhinorrhea.    Respiratory:  Positive for cough, chest tightness, shortness of breath (MCINTOSH) and wheezing.    Cardiovascular:  Negative for chest pain, palpitations and leg swelling.   Gastrointestinal:  Negative for abdominal pain.          Objective   Physical Exam  HENT:      Head: Normocephalic and atraumatic.      Nose: Rhinorrhea present.      Mouth/Throat:      Pharynx: Posterior oropharyngeal erythema present.   Eyes:      Conjunctiva/sclera: Conjunctivae normal.      Pupils: Pupils are equal, round, and reactive to light.   Neck:      Vascular: No carotid bruit.   Cardiovascular:      Rate and Rhythm: Normal rate and regular rhythm.      Heart sounds: Normal heart sounds.   Pulmonary:      Effort: Pulmonary effort is normal.      Breath sounds: Normal breath sounds.   Musculoskeletal:      Right lower leg: No edema.      Left lower leg: No edema.   Neurological:      Mental Status: He is alert.   Psychiatric:         Mood and Affect: Mood normal.              --MEGHAN Ortez - NP

## 2025-03-13 ENCOUNTER — OFFICE VISIT (OUTPATIENT)
Dept: PULMONOLOGY | Age: 85
End: 2025-03-13
Payer: MEDICARE

## 2025-03-13 ENCOUNTER — HOSPITAL ENCOUNTER (OUTPATIENT)
Age: 85
Discharge: HOME | End: 2025-03-13
Payer: MEDICARE

## 2025-03-13 ENCOUNTER — HOSPITAL ENCOUNTER (OUTPATIENT)
Dept: GENERAL RADIOLOGY | Age: 85
Discharge: HOME | End: 2025-03-13
Attending: INTERNAL MEDICINE
Payer: MEDICARE

## 2025-03-13 VITALS
RESPIRATION RATE: 18 BRPM | TEMPERATURE: 98.4 F | HEART RATE: 96 BPM | DIASTOLIC BLOOD PRESSURE: 74 MMHG | BODY MASS INDEX: 31.3 KG/M2 | OXYGEN SATURATION: 95 % | SYSTOLIC BLOOD PRESSURE: 138 MMHG | WEIGHT: 199.4 LBS | HEIGHT: 67 IN

## 2025-03-13 DIAGNOSIS — J40 BRONCHITIS: Primary | ICD-10-CM

## 2025-03-13 DIAGNOSIS — J40 BRONCHITIS: ICD-10-CM

## 2025-03-13 DIAGNOSIS — R91.8 PULMONARY NODULES: ICD-10-CM

## 2025-03-13 DIAGNOSIS — J61 ASBESTOSIS (HCC): ICD-10-CM

## 2025-03-13 PROCEDURE — 1159F MED LIST DOCD IN RCRD: CPT | Performed by: INTERNAL MEDICINE

## 2025-03-13 PROCEDURE — 1036F TOBACCO NON-USER: CPT | Performed by: INTERNAL MEDICINE

## 2025-03-13 PROCEDURE — G8417 CALC BMI ABV UP PARAM F/U: HCPCS | Performed by: INTERNAL MEDICINE

## 2025-03-13 PROCEDURE — 71046 X-RAY EXAM CHEST 2 VIEWS: CPT

## 2025-03-13 PROCEDURE — 3075F SYST BP GE 130 - 139MM HG: CPT | Performed by: INTERNAL MEDICINE

## 2025-03-13 PROCEDURE — G8427 DOCREV CUR MEDS BY ELIG CLIN: HCPCS | Performed by: INTERNAL MEDICINE

## 2025-03-13 PROCEDURE — 3078F DIAST BP <80 MM HG: CPT | Performed by: INTERNAL MEDICINE

## 2025-03-13 PROCEDURE — 99214 OFFICE O/P EST MOD 30 MIN: CPT | Performed by: INTERNAL MEDICINE

## 2025-03-13 PROCEDURE — 1123F ACP DISCUSS/DSCN MKR DOCD: CPT | Performed by: INTERNAL MEDICINE

## 2025-03-13 RX ORDER — PREDNISONE 10 MG/1
TABLET ORAL
Qty: 30 TABLET | Refills: 0 | Status: SHIPPED | OUTPATIENT
Start: 2025-03-13

## 2025-03-13 RX ORDER — ALBUTEROL SULFATE 90 UG/1
2 INHALANT RESPIRATORY (INHALATION) EVERY 4 HOURS PRN
Qty: 18 G | Refills: 11 | Status: SHIPPED | OUTPATIENT
Start: 2025-03-13

## 2025-03-13 NOTE — PROGRESS NOTES
Chief complaint  This is a 84 y.o. year old male  who comes to see me with a chief complaint of   Chief Complaint   Patient presents with    Follow-up    Asbestosis    Breathing Problem    Cough     Hard to take deep breaths    Wheezing         HPI  Here with a cc of cough and congestion    Called to be seen today for cough and mucus.  Stated on zpak and cough pills.  Did not help.  Then got amoxicillin and medrol. Symptoms did improve but then came back.  Choking cough with mucus that is gray at times with wheezing and SOB.  Worried about his breathing as he has never really been this SOB        Current Outpatient Medications:     predniSONE (DELTASONE) 10 MG tablet, 40mg for 3days 30mg for 3days 20mg for 3days 10mg for 3days, Disp: 30 tablet, Rfl: 0    albuterol sulfate HFA (PROVENTIL;VENTOLIN;PROAIR) 108 (90 Base) MCG/ACT inhaler, Inhale 2 puffs into the lungs every 4 hours as needed for Wheezing or Shortness of Breath (or cough), Disp: 18 g, Rfl: 11    hydrocortisone 2.5 % cream, Apply topically 2 times daily Apply topically 2 times daily., Disp: , Rfl:     ipratropium (ATROVENT) 0.06 % nasal spray, 2 sprays by Each Nostril route in the morning and at bedtime, Disp: 15 mL, Rfl: 0    hydrocortisone (ANUSOL-HC) 2.5 % CREA rectal cream, APPLY TO AFFECTED AREA(S) TWICE A DAY, Disp: 30 g, Rfl: 1    hydrocortisone (LOCOID) 0.1 % OINT oitment, APPLY UP TO TWO TIMES A DAY AS NEEDED FOR RASH, Disp: 45 g, Rfl: 0    allopurinol (ZYLOPRIM) 300 MG tablet, TAKE 1 TABLET BY MOUTH DAILY, Disp: 90 tablet, Rfl: 1    ezetimibe-simvastatin (VYTORIN) 10-40 MG per tablet, TAKE 1 TABLET BY MOUTH DAILY, Disp: 90 tablet, Rfl: 2    metoprolol succinate (TOPROL XL) 25 MG extended release tablet, TAKE 1/2 TABLET BY MOUTH DAILY, Disp: 30 tablet, Rfl: 5    lisinopril-hydroCHLOROthiazide (PRINZIDE;ZESTORETIC) 20-25 MG per tablet, TAKE 1 TABLET BY MOUTH DAILY, Disp: 90 tablet, Rfl: 3    tamsulosin (FLOMAX) 0.4 MG capsule, TAKE 1 CAPSULE BY

## 2025-03-14 DIAGNOSIS — I10 ESSENTIAL HYPERTENSION, BENIGN: ICD-10-CM

## 2025-03-14 LAB
ANION GAP SERPL CALCULATED.3IONS-SCNC: 15 MMOL/L (ref 3–16)
BUN SERPL-MCNC: 30 MG/DL (ref 7–20)
CALCIUM SERPL-MCNC: 10.1 MG/DL (ref 8.3–10.6)
CHLORIDE SERPL-SCNC: 101 MMOL/L (ref 99–110)
CO2 SERPL-SCNC: 25 MMOL/L (ref 21–32)
CREAT SERPL-MCNC: 1.3 MG/DL (ref 0.8–1.3)
GFR SERPLBLD CREATININE-BSD FMLA CKD-EPI: 55 ML/MIN/{1.73_M2}
GLUCOSE SERPL-MCNC: 99 MG/DL (ref 70–99)
POTASSIUM SERPL-SCNC: 3.8 MMOL/L (ref 3.5–5.1)
SODIUM SERPL-SCNC: 141 MMOL/L (ref 136–145)

## 2025-03-17 ENCOUNTER — RESULTS FOLLOW-UP (OUTPATIENT)
Dept: FAMILY MEDICINE CLINIC | Age: 85
End: 2025-03-17

## 2025-03-17 ENCOUNTER — TELEPHONE (OUTPATIENT)
Dept: PULMONOLOGY | Age: 85
End: 2025-03-17

## 2025-03-17 NOTE — TELEPHONE ENCOUNTER
Called and spoke with Allen. He still had other questions regarding ILD and the connection with being exposed to asbestos. Pt would like for you to please give him a call when you have a quick moment.

## 2025-03-17 NOTE — TELEPHONE ENCOUNTER
Allen had an xray 3/13 He's seen the results on MyChart and says they sound serious. He'd like a call today.

## 2025-03-18 NOTE — TELEPHONE ENCOUNTER
I called and left message.  I told him I was not sure if it was related to his asbestosis or not but he has an order for a CT chest that he should get as soon as possible so I can look at the lungs in more detail

## 2025-03-18 NOTE — TELEPHONE ENCOUNTER
Patient returned Dr Galdamez's call. Read him the note and transferred him to Central Scheduling to schedule his CT.

## 2025-03-19 ENCOUNTER — HOSPITAL ENCOUNTER (OUTPATIENT)
Dept: CT IMAGING | Age: 85
Discharge: HOME | End: 2025-03-19
Attending: INTERNAL MEDICINE
Payer: MEDICARE

## 2025-03-19 DIAGNOSIS — J61 ASBESTOSIS (HCC): ICD-10-CM

## 2025-03-19 PROCEDURE — 71250 CT THORAX DX C-: CPT

## 2025-03-20 ENCOUNTER — TELEPHONE (OUTPATIENT)
Dept: PULMONOLOGY | Age: 85
End: 2025-03-20

## 2025-03-21 ENCOUNTER — OFFICE VISIT (OUTPATIENT)
Dept: FAMILY MEDICINE CLINIC | Age: 85
End: 2025-03-21
Payer: MEDICARE

## 2025-03-21 ENCOUNTER — TELEPHONE (OUTPATIENT)
Dept: CARDIOLOGY CLINIC | Age: 85
End: 2025-03-21

## 2025-03-21 VITALS
OXYGEN SATURATION: 96 % | WEIGHT: 195 LBS | TEMPERATURE: 98.1 F | BODY MASS INDEX: 30.61 KG/M2 | HEART RATE: 99 BPM | SYSTOLIC BLOOD PRESSURE: 122 MMHG | DIASTOLIC BLOOD PRESSURE: 72 MMHG | HEIGHT: 67 IN

## 2025-03-21 DIAGNOSIS — E78.2 MIXED HYPERLIPIDEMIA: ICD-10-CM

## 2025-03-21 DIAGNOSIS — M10.9 GOUT OF FOOT, UNSPECIFIED CAUSE, UNSPECIFIED CHRONICITY, UNSPECIFIED LATERALITY: ICD-10-CM

## 2025-03-21 DIAGNOSIS — R06.02 SOB (SHORTNESS OF BREATH): Primary | ICD-10-CM

## 2025-03-21 DIAGNOSIS — I10 ESSENTIAL HYPERTENSION, BENIGN: Primary | ICD-10-CM

## 2025-03-21 DIAGNOSIS — R94.4 DECREASED GFR: ICD-10-CM

## 2025-03-21 PROCEDURE — 99214 OFFICE O/P EST MOD 30 MIN: CPT | Performed by: NURSE PRACTITIONER

## 2025-03-21 PROCEDURE — 1159F MED LIST DOCD IN RCRD: CPT | Performed by: NURSE PRACTITIONER

## 2025-03-21 PROCEDURE — G8417 CALC BMI ABV UP PARAM F/U: HCPCS | Performed by: NURSE PRACTITIONER

## 2025-03-21 PROCEDURE — G8427 DOCREV CUR MEDS BY ELIG CLIN: HCPCS | Performed by: NURSE PRACTITIONER

## 2025-03-21 PROCEDURE — 3078F DIAST BP <80 MM HG: CPT | Performed by: NURSE PRACTITIONER

## 2025-03-21 PROCEDURE — 1123F ACP DISCUSS/DSCN MKR DOCD: CPT | Performed by: NURSE PRACTITIONER

## 2025-03-21 PROCEDURE — 1036F TOBACCO NON-USER: CPT | Performed by: NURSE PRACTITIONER

## 2025-03-21 PROCEDURE — 3074F SYST BP LT 130 MM HG: CPT | Performed by: NURSE PRACTITIONER

## 2025-03-21 ASSESSMENT — ENCOUNTER SYMPTOMS
SHORTNESS OF BREATH: 0
COUGH: 1
ABDOMINAL PAIN: 0

## 2025-03-21 NOTE — PROGRESS NOTES
Immunization History   Administered Date(s) Administered    COVID-19, MODERNA BLUE border, Primary or Immunocompromised, (age 12y+), IM, 100 mcg/0.5mL 01/28/2021, 02/25/2021, 02/05/2022    COVID-19, MODERNA Booster BLUE border, (age 18y+), IM, 50mcg/0.25mL 10/28/2021    Influenza Virus Vaccine 10/27/2008, 09/14/2009, 10/01/2010, 10/01/2011, 09/07/2012, 10/01/2013, 09/10/2014, 10/01/2014, 09/28/2015, 10/01/2017, 10/01/2018, 10/01/2021, 10/01/2022    Influenza, FLUAD, (age 65 y+), IM, Quadv, 0.5mL 09/27/2022, 10/19/2023    Influenza, FLUBLOK, (age 18 y+), Quadv PF, 0.5mL 10/11/2018, 10/12/2020    Influenza, FLUZONE High Dose (age 65 y+), IM, Quadv, 0.7mL 10/11/2021, 09/26/2024    Influenza, FLUZONE High Dose, (age 65 y+), IM, Trivalent PF, 0.5mL 09/28/2015, 10/11/2016, 10/15/2017, 10/02/2019, 10/12/2020, 09/26/2024    Pneumococcal Conjugate 7-valent (Prevnar7) 12/02/2008    Pneumococcal Vaccine 10/01/2010    Pneumococcal, PCV-13, PREVNAR 13, (age 6w+), IM, 0.5mL 04/14/2015, 05/04/2016, 04/24/2019    Pneumococcal, PPSV23, PNEUMOVAX 23, (age 2y+), SC/IM, 0.5mL 05/05/2017    TDaP, ADACEL (age 10y-64y), BOOSTRIX (age 10y+), IM, 0.5mL 06/20/2011, 10/11/2021    Td vaccine (adult) 08/20/2011    Zoster Live (Zostavax) 03/25/2009    Zoster Recombinant (Shingrix) 04/25/2018, 03/01/2019        Yes

## 2025-03-21 NOTE — TELEPHONE ENCOUNTER
Allen called in stating that he has been experiencing dry cough and trouble breathing. Allen states he saw his PCP and pulmonology due to these symptoms and was prescribed zpak along with a couple xray's. Allen states that results from chest xray showed he had interstitial lung disease. He states he was in the [employer] and was exposed to Asbestos and Dr. Galdamez has confirmed that this is the reasoning for his dry cough and trouble breathing. Allen shares that Dr. Galdamez then ordered a chest CT, which has not been resulted yet. He states that PCP did lab work and reported that his kidney function has decreased and may be due to lisinopril-hydroCHLOROthiazide (PRINZIDE;ZESTORETIC) 20-25 MG per tablet.    Allen would like to know what Shon's recommendations are, if any?    Allen's callback: [phone]

## 2025-03-21 NOTE — PROGRESS NOTES
Praful Esposito (:  1940) is a 84 y.o. male,Established patient, here for evaluation of the following chief complaint(s):  Check-Up (Hypertension, hyperlipidemia, gout- review and discuss lab results from 3/14/2025)      Assessment & Plan   ASSESSMENT/PLAN:  1. Essential hypertension, benign  Stable. Repeat labs in 3 months, call Dr. Greene office re HCTZ in BP pill  - Basic Metabolic Panel; Future    2. Decreased GFR  Stable. Repeat labs in 3 months, call Dr. Greene office re HCTZ in BP pill  - Basic Metabolic Panel; Future    3. Mixed hyperlipidemia  Stable. Reviewed labs.     4. Gout of foot, unspecified cause, unspecified chronicity, unspecified laterality  Stable. Pt has not had a flare in yrs on allopurinol.        Return in about 6 months (around 2025).         Subjective   SUBJECTIVE/OBJECTIVE:  HPI  Chief Complaint   Patient presents with    Check-Up     Hypertension, hyperlipidemia, gout- review and discuss lab results from 3/14/2025   Dry cough, seen Dr. Galdamez 2025 no relief with zpack/medrol dose pack, had follow up, given increased Prednisone and took amoxicillin for dental infection. Some improvement but lingering cough, CXR 3/13/2025, interstitial lung disease, edema or atypical infection. States he has to sleep with HOB slightly elevated to help with breathing. Rudolph thinks it may be heart related.   Cardiac-down in weight, fasting, eats healthy options, does not eat out.   Drinking at least 64 oz water a day.   Current Outpatient Medications   Medication Sig Dispense Refill    predniSONE (DELTASONE) 10 MG tablet 40mg for 3days 30mg for 3days 20mg for 3days 10mg for 3days 30 tablet 0    albuterol sulfate HFA (PROVENTIL;VENTOLIN;PROAIR) 108 (90 Base) MCG/ACT inhaler Inhale 2 puffs into the lungs every 4 hours as needed for Wheezing or Shortness of Breath (or cough) 18 g 11    hydrocortisone 2.5 % cream Apply topically 2 times daily Apply topically 2 times daily.

## 2025-03-21 NOTE — PATIENT INSTRUCTIONS
Call Dr. Greene office to discuss dry cough, decreased kidney function with the hydrochlorothiazide in BP pill.     Thank you for choosing [facility].    Please bring a current list of medications to every appointment.    Please contact your pharmacy for any prescription refill(s) that you are requesting.

## 2025-03-21 NOTE — TELEPHONE ENCOUNTER
Called Allen:  -Mainly he wanted to update Dr. Menendez on his lung workup  -States he quickly became short of breath with wheezing and dry cough over a course of 2 months  -Awaiting Dr. Galdamez to review recent CT   -He is concerned it may evolve into some level of CHF or there may be some overlap in symptoms. He has never been diagnosed with CHF.    -Additionally his PCP advised he call regarding recent bloodwork  -He is on Lisinopril-HCTZ 20-25 mg daily and worried about it affecting his kidneys  (Not sure if there may be overlap with this med and his reported dry cough?)  -GFR 55 last two checks and BUN mildly elevated (seemingly at baseline). SCr 1.1-1.3.     Please advise

## 2025-03-21 NOTE — TELEPHONE ENCOUNTER
I called Allen.  No answer so I left a voicemail.  Continue current treatment including the lisinopril hydrochlorothiazide but repeat BMP towards the end of next week.  Please order this.

## 2025-03-24 ENCOUNTER — RESULTS FOLLOW-UP (OUTPATIENT)
Dept: CT IMAGING | Age: 85
End: 2025-03-24

## 2025-03-25 ENCOUNTER — OFFICE VISIT (OUTPATIENT)
Dept: PULMONOLOGY | Age: 85
End: 2025-03-25
Payer: MEDICARE

## 2025-03-25 VITALS
WEIGHT: 198 LBS | BODY MASS INDEX: 31.08 KG/M2 | OXYGEN SATURATION: 93 % | DIASTOLIC BLOOD PRESSURE: 85 MMHG | RESPIRATION RATE: 18 BRPM | HEIGHT: 67 IN | HEART RATE: 88 BPM | SYSTOLIC BLOOD PRESSURE: 138 MMHG

## 2025-03-25 DIAGNOSIS — R91.8 PULMONARY NODULES: ICD-10-CM

## 2025-03-25 DIAGNOSIS — J61 ASBESTOSIS (HCC): ICD-10-CM

## 2025-03-25 DIAGNOSIS — J84.9 ILD (INTERSTITIAL LUNG DISEASE) (HCC): Primary | ICD-10-CM

## 2025-03-25 PROCEDURE — 1159F MED LIST DOCD IN RCRD: CPT | Performed by: INTERNAL MEDICINE

## 2025-03-25 PROCEDURE — 99214 OFFICE O/P EST MOD 30 MIN: CPT | Performed by: INTERNAL MEDICINE

## 2025-03-25 PROCEDURE — G8427 DOCREV CUR MEDS BY ELIG CLIN: HCPCS | Performed by: INTERNAL MEDICINE

## 2025-03-25 PROCEDURE — G8417 CALC BMI ABV UP PARAM F/U: HCPCS | Performed by: INTERNAL MEDICINE

## 2025-03-25 PROCEDURE — 1036F TOBACCO NON-USER: CPT | Performed by: INTERNAL MEDICINE

## 2025-03-25 PROCEDURE — 3075F SYST BP GE 130 - 139MM HG: CPT | Performed by: INTERNAL MEDICINE

## 2025-03-25 PROCEDURE — 3079F DIAST BP 80-89 MM HG: CPT | Performed by: INTERNAL MEDICINE

## 2025-03-25 PROCEDURE — 1123F ACP DISCUSS/DSCN MKR DOCD: CPT | Performed by: INTERNAL MEDICINE

## 2025-03-25 NOTE — PROGRESS NOTES
Chief complaint  This is a 84 y.o. year old male  who comes to see me with a chief complaint of   Chief Complaint   Patient presents with    Follow-up    Pulmnoary nodule    Asbestosis     Review CT results    Shortness of Breath         HPI  Here with a cc of cough and congestion    HE recently was treated with prednisone burst and did CT chest.  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  He says the prednisone did not help much.  He only took a 12 day course. Albuterol did nothing.  He is still SOB and coughing.  Daughter is present.         Current Outpatient Medications:     predniSONE (DELTASONE) 10 MG tablet, 40mg for 3days 30mg for 3days 20mg for 3days 10mg for 3days, Disp: 30 tablet, Rfl: 0    albuterol sulfate HFA (PROVENTIL;VENTOLIN;PROAIR) 108 (90 Base) MCG/ACT inhaler, Inhale 2 puffs into the lungs every 4 hours as needed for Wheezing or Shortness of Breath (or cough), Disp: 18 g, Rfl: 11    hydrocortisone 2.5 % cream, Apply topically 2 times daily Apply topically 2 times daily., Disp: , Rfl:     ipratropium (ATROVENT) 0.06 % nasal spray, 2 sprays by Each Nostril route in the morning and at bedtime, Disp: 15 mL, Rfl: 0    hydrocortisone (ANUSOL-HC) 2.5 % CREA rectal cream, APPLY TO AFFECTED AREA(S) TWICE A DAY, Disp: 30 g, Rfl: 1    hydrocortisone (LOCOID) 0.1 % OINT oitment, APPLY UP TO TWO TIMES A DAY AS NEEDED FOR RASH, Disp: 45 g, Rfl: 0    allopurinol (ZYLOPRIM) 300 MG tablet, TAKE 1 TABLET BY MOUTH DAILY, Disp: 90 tablet, Rfl: 1    ezetimibe-simvastatin (VYTORIN) 10-40 MG per tablet, TAKE 1 TABLET BY MOUTH DAILY, Disp: 90 tablet, Rfl: 2    metoprolol succinate (TOPROL XL) 25 MG extended release tablet, TAKE 1/2 TABLET BY MOUTH DAILY, Disp: 30 tablet, Rfl: 5    lisinopril-hydroCHLOROthiazide (PRINZIDE;ZESTORETIC) 20-25 MG per tablet, TAKE 1 TABLET BY MOUTH DAILY, Disp: 90 tablet, Rfl: 3    tamsulosin (FLOMAX) 0.4 MG capsule, TAKE 1 CAPSULE BY MOUTH DAILY, Disp: 90

## 2025-04-30 DIAGNOSIS — R35.1 NOCTURIA: ICD-10-CM

## 2025-04-30 RX ORDER — TAMSULOSIN HYDROCHLORIDE 0.4 MG/1
0.4 CAPSULE ORAL DAILY
Qty: 90 CAPSULE | Refills: 1 | Status: SHIPPED | OUTPATIENT
Start: 2025-04-30

## 2025-05-05 ENCOUNTER — OFFICE VISIT (OUTPATIENT)
Dept: FAMILY MEDICINE CLINIC | Age: 85
End: 2025-05-05
Payer: MEDICARE

## 2025-05-05 VITALS
TEMPERATURE: 97.7 F | SYSTOLIC BLOOD PRESSURE: 126 MMHG | OXYGEN SATURATION: 96 % | HEIGHT: 67 IN | DIASTOLIC BLOOD PRESSURE: 62 MMHG | BODY MASS INDEX: 31.11 KG/M2 | WEIGHT: 198.2 LBS | HEART RATE: 79 BPM

## 2025-05-05 DIAGNOSIS — R06.02 SOB (SHORTNESS OF BREATH): ICD-10-CM

## 2025-05-05 DIAGNOSIS — J84.9 ILD (INTERSTITIAL LUNG DISEASE) (HCC): Primary | ICD-10-CM

## 2025-05-05 DIAGNOSIS — N28.9 DECREASED RENAL FUNCTION: ICD-10-CM

## 2025-05-05 DIAGNOSIS — R05.8 COUGH WITH SPUTUM: ICD-10-CM

## 2025-05-05 PROCEDURE — 99213 OFFICE O/P EST LOW 20 MIN: CPT | Performed by: NURSE PRACTITIONER

## 2025-05-05 PROCEDURE — 3074F SYST BP LT 130 MM HG: CPT | Performed by: NURSE PRACTITIONER

## 2025-05-05 PROCEDURE — 1159F MED LIST DOCD IN RCRD: CPT | Performed by: NURSE PRACTITIONER

## 2025-05-05 PROCEDURE — 1123F ACP DISCUSS/DSCN MKR DOCD: CPT | Performed by: NURSE PRACTITIONER

## 2025-05-05 PROCEDURE — 3078F DIAST BP <80 MM HG: CPT | Performed by: NURSE PRACTITIONER

## 2025-05-05 PROCEDURE — G8427 DOCREV CUR MEDS BY ELIG CLIN: HCPCS | Performed by: NURSE PRACTITIONER

## 2025-05-05 PROCEDURE — G8417 CALC BMI ABV UP PARAM F/U: HCPCS | Performed by: NURSE PRACTITIONER

## 2025-05-05 PROCEDURE — 1036F TOBACCO NON-USER: CPT | Performed by: NURSE PRACTITIONER

## 2025-05-05 RX ORDER — IPRATROPIUM BROMIDE 42 UG/1
SPRAY, METERED NASAL
Qty: 15 ML | Refills: 0 | Status: SHIPPED | OUTPATIENT
Start: 2025-05-05

## 2025-05-05 ASSESSMENT — ENCOUNTER SYMPTOMS
WHEEZING: 1
COUGH: 1
GASTROINTESTINAL NEGATIVE: 1

## 2025-05-05 NOTE — PATIENT INSTRUCTIONS
Decrease nasal spray to 1 spray each nostril at night, use saline nasal spray daily to keep mucous membranes moist.

## 2025-05-05 NOTE — PROGRESS NOTES
1.6     Pulmonary nodule 12/27/2019    Needs CT 6.2019     PVC's (premature ventricular contractions)         Review of Systems   Constitutional:  Negative for fever and unexpected weight change.   Respiratory:  Positive for cough and wheezing.    Cardiovascular: Negative.    Gastrointestinal: Negative.           Objective   Physical Exam  Constitutional:       General: He is not in acute distress.  HENT:      Head: Normocephalic and atraumatic.   Eyes:      Extraocular Movements: Extraocular movements intact.      Conjunctiva/sclera: Conjunctivae normal.      Pupils: Pupils are equal, round, and reactive to light.   Neck:      Vascular: No carotid bruit.   Cardiovascular:      Rate and Rhythm: Normal rate and regular rhythm.      Heart sounds: Normal heart sounds.   Pulmonary:      Effort: Pulmonary effort is normal.      Breath sounds: Wheezing (exp) present.   Musculoskeletal:      Right lower leg: No edema.      Left lower leg: No edema.   Skin:     General: Skin is warm and dry.   Neurological:      Mental Status: He is alert and oriented to person, place, and time.   Psychiatric:         Mood and Affect: Mood normal.              --EMGHAN Ortez - NP

## 2025-05-06 LAB
ANION GAP SERPL CALCULATED.3IONS-SCNC: 13 MMOL/L (ref 3–16)
BUN SERPL-MCNC: 24 MG/DL (ref 7–20)
CALCIUM SERPL-MCNC: 9.2 MG/DL (ref 8.3–10.6)
CHLORIDE SERPL-SCNC: 99 MMOL/L (ref 99–110)
CO2 SERPL-SCNC: 27 MMOL/L (ref 21–32)
CREAT SERPL-MCNC: 1.3 MG/DL (ref 0.8–1.3)
GFR SERPLBLD CREATININE-BSD FMLA CKD-EPI: 55 ML/MIN/{1.73_M2}
GLUCOSE SERPL-MCNC: 103 MG/DL (ref 70–99)
POTASSIUM SERPL-SCNC: 4.3 MMOL/L (ref 3.5–5.1)
SODIUM SERPL-SCNC: 139 MMOL/L (ref 136–145)

## 2025-06-05 ENCOUNTER — TELEPHONE (OUTPATIENT)
Dept: FAMILY MEDICINE CLINIC | Age: 85
End: 2025-06-05

## 2025-06-05 RX ORDER — HYDROCORTISONE BUTYRATE 1 MG/G
OINTMENT TOPICAL
Qty: 45 G | Refills: 0 | Status: SHIPPED | OUTPATIENT
Start: 2025-06-05

## 2025-06-05 NOTE — TELEPHONE ENCOUNTER
Patient called the the Hydrocortisone 0.1 cream 45 grams expires,be before he can use it all.  He is asking if you can just send in a new order for 30 grams?      If so send to [facility]    Please advise, [phone]

## 2025-06-11 DIAGNOSIS — M10.9 GOUT OF FOOT, UNSPECIFIED CAUSE, UNSPECIFIED CHRONICITY, UNSPECIFIED LATERALITY: ICD-10-CM

## 2025-06-11 RX ORDER — ALLOPURINOL 300 MG/1
300 TABLET ORAL DAILY
Qty: 90 TABLET | Refills: 0 | Status: SHIPPED | OUTPATIENT
Start: 2025-06-11

## 2025-06-23 ENCOUNTER — TELEPHONE (OUTPATIENT)
Dept: PULMONOLOGY | Age: 85
End: 2025-06-23

## 2025-06-23 ENCOUNTER — HOSPITAL ENCOUNTER (OUTPATIENT)
Dept: CT IMAGING | Age: 85
Discharge: HOME | End: 2025-06-23
Attending: INTERNAL MEDICINE
Payer: MEDICARE

## 2025-06-23 ENCOUNTER — OFFICE VISIT (OUTPATIENT)
Dept: PULMONOLOGY | Age: 85
End: 2025-06-23
Payer: MEDICARE

## 2025-06-23 VITALS
TEMPERATURE: 97.4 F | RESPIRATION RATE: 18 BRPM | BODY MASS INDEX: 31.27 KG/M2 | HEART RATE: 82 BPM | OXYGEN SATURATION: 94 % | SYSTOLIC BLOOD PRESSURE: 142 MMHG | DIASTOLIC BLOOD PRESSURE: 82 MMHG | WEIGHT: 199.2 LBS | HEIGHT: 67 IN

## 2025-06-23 DIAGNOSIS — R91.8 PULMONARY NODULES: ICD-10-CM

## 2025-06-23 DIAGNOSIS — J84.9 ILD (INTERSTITIAL LUNG DISEASE) (HCC): Primary | ICD-10-CM

## 2025-06-23 DIAGNOSIS — J61 ASBESTOSIS (HCC): ICD-10-CM

## 2025-06-23 PROCEDURE — 1036F TOBACCO NON-USER: CPT | Performed by: INTERNAL MEDICINE

## 2025-06-23 PROCEDURE — G2211 COMPLEX E/M VISIT ADD ON: HCPCS | Performed by: INTERNAL MEDICINE

## 2025-06-23 PROCEDURE — 71250 CT THORAX DX C-: CPT

## 2025-06-23 PROCEDURE — 1159F MED LIST DOCD IN RCRD: CPT | Performed by: INTERNAL MEDICINE

## 2025-06-23 PROCEDURE — 3077F SYST BP >= 140 MM HG: CPT | Performed by: INTERNAL MEDICINE

## 2025-06-23 PROCEDURE — G8417 CALC BMI ABV UP PARAM F/U: HCPCS | Performed by: INTERNAL MEDICINE

## 2025-06-23 PROCEDURE — 99214 OFFICE O/P EST MOD 30 MIN: CPT | Performed by: INTERNAL MEDICINE

## 2025-06-23 PROCEDURE — G8427 DOCREV CUR MEDS BY ELIG CLIN: HCPCS | Performed by: INTERNAL MEDICINE

## 2025-06-23 PROCEDURE — 1123F ACP DISCUSS/DSCN MKR DOCD: CPT | Performed by: INTERNAL MEDICINE

## 2025-06-23 PROCEDURE — 3079F DIAST BP 80-89 MM HG: CPT | Performed by: INTERNAL MEDICINE

## 2025-06-23 NOTE — PROGRESS NOTES
Chief complaint  This is a 84 y.o. year old male  who comes to see me with a chief complaint of   Chief Complaint   Patient presents with    Follow-up     ILD         HPI  Here with a cc of ILD    He started taking a herbal tea and after 1-2 weeks his breathing improved.  He is not SOB and not wheezing or coughing   He says he feels a lot better.  This was recommended to him by a friend.  We previously discussed trying prednisone.  He did not want to take it.  He is much better       Current Outpatient Medications:     allopurinol (ZYLOPRIM) 300 MG tablet, TAKE 1 TABLET BY MOUTH DAILY, Disp: 90 tablet, Rfl: 0    hydrocortisone (LOCOID) 0.1 % OINT oitment, APPLY UP TO TWO TIMES A DAY AS NEEDED FOR RASH, Disp: 45 g, Rfl: 0    NONFORMULARY, Take 3 CUP by mouth daily STRONGER LUNGS DETOX TEA, Disp: , Rfl:     ipratropium (ATROVENT) 0.06 % nasal spray, SPRAY 2 SPRAYS IN EACH NOSTRIL 2 TIMES A DAY IN THE MORNING AND AT BEDTIME, Disp: 15 mL, Rfl: 0    tamsulosin (FLOMAX) 0.4 MG capsule, TAKE 1 CAPSULE BY MOUTH DAILY, Disp: 90 capsule, Rfl: 1    hydrocortisone (ANUSOL-HC) 2.5 % CREA rectal cream, APPLY TO AFFECTED AREA(S) TWICE A DAY, Disp: 30 g, Rfl: 1    ezetimibe-simvastatin (VYTORIN) 10-40 MG per tablet, TAKE 1 TABLET BY MOUTH DAILY, Disp: 90 tablet, Rfl: 2    metoprolol succinate (TOPROL XL) 25 MG extended release tablet, TAKE 1/2 TABLET BY MOUTH DAILY, Disp: 30 tablet, Rfl: 5    lisinopril-hydroCHLOROthiazide (PRINZIDE;ZESTORETIC) 20-25 MG per tablet, TAKE 1 TABLET BY MOUTH DAILY, Disp: 90 tablet, Rfl: 3    Multiple Vitamins-Minerals (MULTI FOR HIM 50+ PO), Take by mouth, Disp: , Rfl:     aspirin 81 MG EC tablet, Take 1 tablet by mouth daily, Disp: 90 tablet, Rfl: 3    MAGNESIUM PO, Take 250 mg by mouth, Disp: , Rfl:     albuterol sulfate HFA (PROVENTIL;VENTOLIN;PROAIR) 108 (90 Base) MCG/ACT inhaler, Inhale 2 puffs into the lungs every 4 hours as needed for Wheezing or Shortness of Breath (or cough) (Patient not

## 2025-06-23 NOTE — TELEPHONE ENCOUNTER
Patient was able to have the CT Scan done today. Wanted to know if the results would be in.Please call

## 2025-06-29 DIAGNOSIS — R05.8 COUGH WITH SPUTUM: ICD-10-CM

## 2025-06-30 ENCOUNTER — RESULTS FOLLOW-UP (OUTPATIENT)
Dept: PULMONOLOGY | Age: 85
End: 2025-06-30

## 2025-06-30 RX ORDER — IPRATROPIUM BROMIDE 42 UG/1
SPRAY, METERED NASAL
Qty: 15 ML | Refills: 0 | OUTPATIENT
Start: 2025-06-30

## 2025-07-01 ENCOUNTER — OFFICE VISIT (OUTPATIENT)
Dept: FAMILY MEDICINE CLINIC | Age: 85
End: 2025-07-01
Payer: MEDICARE

## 2025-07-01 VITALS
TEMPERATURE: 98 F | HEIGHT: 67 IN | DIASTOLIC BLOOD PRESSURE: 82 MMHG | SYSTOLIC BLOOD PRESSURE: 124 MMHG | OXYGEN SATURATION: 97 % | HEART RATE: 75 BPM | BODY MASS INDEX: 31.23 KG/M2 | WEIGHT: 199 LBS

## 2025-07-01 DIAGNOSIS — B96.89 ACUTE BACTERIAL SINUSITIS: Primary | ICD-10-CM

## 2025-07-01 DIAGNOSIS — R05.8 COUGH WITH SPUTUM: ICD-10-CM

## 2025-07-01 DIAGNOSIS — J01.90 ACUTE BACTERIAL SINUSITIS: Primary | ICD-10-CM

## 2025-07-01 PROCEDURE — 1159F MED LIST DOCD IN RCRD: CPT | Performed by: NURSE PRACTITIONER

## 2025-07-01 PROCEDURE — 3074F SYST BP LT 130 MM HG: CPT | Performed by: NURSE PRACTITIONER

## 2025-07-01 PROCEDURE — 3079F DIAST BP 80-89 MM HG: CPT | Performed by: NURSE PRACTITIONER

## 2025-07-01 PROCEDURE — G8427 DOCREV CUR MEDS BY ELIG CLIN: HCPCS | Performed by: NURSE PRACTITIONER

## 2025-07-01 PROCEDURE — 1036F TOBACCO NON-USER: CPT | Performed by: NURSE PRACTITIONER

## 2025-07-01 PROCEDURE — 99213 OFFICE O/P EST LOW 20 MIN: CPT | Performed by: NURSE PRACTITIONER

## 2025-07-01 PROCEDURE — 1123F ACP DISCUSS/DSCN MKR DOCD: CPT | Performed by: NURSE PRACTITIONER

## 2025-07-01 PROCEDURE — G8417 CALC BMI ABV UP PARAM F/U: HCPCS | Performed by: NURSE PRACTITIONER

## 2025-07-01 RX ORDER — AMOXICILLIN 500 MG/1
500 CAPSULE ORAL 2 TIMES DAILY
Qty: 14 CAPSULE | Refills: 0 | Status: SHIPPED | OUTPATIENT
Start: 2025-07-01 | End: 2025-07-08

## 2025-07-01 RX ORDER — IPRATROPIUM BROMIDE 42 UG/1
SPRAY, METERED NASAL
Qty: 15 ML | Refills: 2 | Status: SHIPPED | OUTPATIENT
Start: 2025-07-01

## 2025-07-01 NOTE — PROGRESS NOTES
AFFECTED AREA(S) TWICE A DAY 30 g 1    ezetimibe-simvastatin (VYTORIN) 10-40 MG per tablet TAKE 1 TABLET BY MOUTH DAILY 90 tablet 2    metoprolol succinate (TOPROL XL) 25 MG extended release tablet TAKE 1/2 TABLET BY MOUTH DAILY 30 tablet 5    lisinopril-hydroCHLOROthiazide (PRINZIDE;ZESTORETIC) 20-25 MG per tablet TAKE 1 TABLET BY MOUTH DAILY 90 tablet 3    Multiple Vitamins-Minerals (MULTI FOR HIM 50+ PO) Take by mouth      aspirin 81 MG EC tablet Take 1 tablet by mouth daily 90 tablet 3    MAGNESIUM PO Take 250 mg by mouth       No current facility-administered medications for this visit.        Past Medical History:   Diagnosis Date    Abnormal breath sounds 12/3/2019    Aortic stenosis, mild 03/27/2018    Echo 3/30/18 - mild  with preserved EF    Calculus of gallbladder without cholecystitis without obstruction 12/27/2019    Colitis     Dermatitis 6/24/2019    Diverticulitis     Diverticulosis     Elevated liver enzymes 11/19/2018    Essential hypertension, benign     Gout     Hyperlipidemia     Malignant melanoma of neck (HCC)     Malignant melanoma of neck (HCC) 9/24/2013    Obesity, Class I, BMI 30.0-34.9 (see actual BMI) 6/24/2019    Overweight with body mass index (BMI) 25.0-29.9 6/24/2019    Prostatism 6/24/2019    Follows with urology q 6 month psa. 5.2019 1.6     Pulmonary nodule 12/27/2019    Needs CT 6.2019     PVC's (premature ventricular contractions)         Review of Systems   Constitutional:  Negative for unexpected weight change.   HENT:  Positive for congestion, postnasal drip, rhinorrhea, sinus pressure and sinus pain. Negative for ear pain.    Respiratory:  Positive for cough. Negative for shortness of breath and wheezing.           Objective   Physical Exam  HENT:      Head: Normocephalic and atraumatic.      Nose: Rhinorrhea present.      Right Sinus: Maxillary sinus tenderness present.      Left Sinus: Maxillary sinus tenderness present.      Mouth/Throat:      Mouth: Mucous membranes are

## 2025-07-09 ASSESSMENT — ENCOUNTER SYMPTOMS
WHEEZING: 0
SINUS PRESSURE: 1
RHINORRHEA: 1
SINUS PAIN: 1
COUGH: 1
SHORTNESS OF BREATH: 0

## 2025-09-02 ENCOUNTER — TELEPHONE (OUTPATIENT)
Dept: FAMILY MEDICINE CLINIC | Age: 85
End: 2025-09-02

## 2025-09-02 DIAGNOSIS — E78.2 MIXED HYPERLIPIDEMIA: ICD-10-CM

## 2025-09-02 DIAGNOSIS — I10 ESSENTIAL HYPERTENSION, BENIGN: Primary | ICD-10-CM

## 2025-09-02 DIAGNOSIS — R73.03 PREDIABETES: ICD-10-CM

## 2025-09-02 DIAGNOSIS — R94.4 DECREASED GFR: ICD-10-CM

## (undated) DEVICE — GLOVE ORANGE PI 7 1/2   MSG9075

## (undated) DEVICE — OPTIFOAM GENTLE LIQUITRAP, SACRUM, 7"X7": Brand: MEDLINE